# Patient Record
Sex: MALE | Race: WHITE | NOT HISPANIC OR LATINO | URBAN - METROPOLITAN AREA
[De-identification: names, ages, dates, MRNs, and addresses within clinical notes are randomized per-mention and may not be internally consistent; named-entity substitution may affect disease eponyms.]

---

## 2017-04-05 ENCOUNTER — EMERGENCY (EMERGENCY)
Facility: HOSPITAL | Age: 67
LOS: 1 days | Discharge: HOME | End: 2017-04-05
Attending: EMERGENCY MEDICINE | Admitting: EMERGENCY MEDICINE
Payer: COMMERCIAL

## 2017-04-05 VITALS — SYSTOLIC BLOOD PRESSURE: 106 MMHG | DIASTOLIC BLOOD PRESSURE: 69 MMHG

## 2017-04-05 DIAGNOSIS — E78.5 HYPERLIPIDEMIA, UNSPECIFIED: ICD-10-CM

## 2017-04-05 DIAGNOSIS — R61 GENERALIZED HYPERHIDROSIS: ICD-10-CM

## 2017-04-05 DIAGNOSIS — R07.2 PRECORDIAL PAIN: ICD-10-CM

## 2017-04-05 DIAGNOSIS — I25.10 ATHEROSCLEROTIC HEART DISEASE OF NATIVE CORONARY ARTERY WITHOUT ANGINA PECTORIS: ICD-10-CM

## 2017-04-05 DIAGNOSIS — Z95.5 PRESENCE OF CORONARY ANGIOPLASTY IMPLANT AND GRAFT: Chronic | ICD-10-CM

## 2017-04-05 DIAGNOSIS — Z09 ENCOUNTER FOR FOLLOW-UP EXAMINATION AFTER COMPLETED TREATMENT FOR CONDITIONS OTHER THAN MALIGNANT NEOPLASM: Chronic | ICD-10-CM

## 2017-04-05 DIAGNOSIS — R42 DIZZINESS AND GIDDINESS: ICD-10-CM

## 2017-04-05 DIAGNOSIS — I25.2 OLD MYOCARDIAL INFARCTION: ICD-10-CM

## 2017-04-05 DIAGNOSIS — Z79.82 LONG TERM (CURRENT) USE OF ASPIRIN: ICD-10-CM

## 2017-04-05 DIAGNOSIS — Z95.0 PRESENCE OF CARDIAC PACEMAKER: Chronic | ICD-10-CM

## 2017-04-05 DIAGNOSIS — I10 ESSENTIAL (PRIMARY) HYPERTENSION: ICD-10-CM

## 2017-04-05 DIAGNOSIS — Z98.89 OTHER SPECIFIED POSTPROCEDURAL STATES: Chronic | ICD-10-CM

## 2017-04-05 LAB
ALBUMIN SERPL ELPH-MCNC: 4.5 G/DL — SIGNIFICANT CHANGE UP (ref 3.3–5)
ALP SERPL-CCNC: 81 U/L — SIGNIFICANT CHANGE UP (ref 40–120)
ALT FLD-CCNC: 29 U/L RC — SIGNIFICANT CHANGE UP (ref 10–45)
ANION GAP SERPL CALC-SCNC: 14 MMOL/L — SIGNIFICANT CHANGE UP (ref 5–17)
APTT BLD: 28.1 SEC — SIGNIFICANT CHANGE UP (ref 27.5–37.4)
AST SERPL-CCNC: 30 U/L — SIGNIFICANT CHANGE UP (ref 10–40)
BASOPHILS # BLD AUTO: 0.1 K/UL — SIGNIFICANT CHANGE UP (ref 0–0.2)
BASOPHILS NFR BLD AUTO: 1 % — SIGNIFICANT CHANGE UP (ref 0–2)
BILIRUB SERPL-MCNC: 1.1 MG/DL — SIGNIFICANT CHANGE UP (ref 0.2–1.2)
BUN SERPL-MCNC: 19 MG/DL — SIGNIFICANT CHANGE UP (ref 7–23)
CALCIUM SERPL-MCNC: 9 MG/DL — SIGNIFICANT CHANGE UP (ref 8.4–10.5)
CHLORIDE SERPL-SCNC: 103 MMOL/L — SIGNIFICANT CHANGE UP (ref 96–108)
CO2 SERPL-SCNC: 25 MMOL/L — SIGNIFICANT CHANGE UP (ref 22–31)
CREAT SERPL-MCNC: 1.28 MG/DL — SIGNIFICANT CHANGE UP (ref 0.5–1.3)
EOSINOPHIL # BLD AUTO: 0.2 K/UL — SIGNIFICANT CHANGE UP (ref 0–0.5)
EOSINOPHIL NFR BLD AUTO: 2 % — SIGNIFICANT CHANGE UP (ref 0–6)
GLUCOSE SERPL-MCNC: 95 MG/DL — SIGNIFICANT CHANGE UP (ref 70–99)
HCT VFR BLD CALC: 44.3 % — SIGNIFICANT CHANGE UP (ref 39–50)
HGB BLD-MCNC: 15.3 G/DL — SIGNIFICANT CHANGE UP (ref 13–17)
INR BLD: 1.23 RATIO — HIGH (ref 0.88–1.16)
LIDOCAIN IGE QN: 49 U/L — SIGNIFICANT CHANGE UP (ref 7–60)
LYMPHOCYTES # BLD AUTO: 1.6 K/UL — SIGNIFICANT CHANGE UP (ref 1–3.3)
LYMPHOCYTES # BLD AUTO: 16.5 % — SIGNIFICANT CHANGE UP (ref 13–44)
MCHC RBC-ENTMCNC: 30.1 PG — SIGNIFICANT CHANGE UP (ref 27–34)
MCHC RBC-ENTMCNC: 34.6 GM/DL — SIGNIFICANT CHANGE UP (ref 32–36)
MCV RBC AUTO: 87.1 FL — SIGNIFICANT CHANGE UP (ref 80–100)
MONOCYTES # BLD AUTO: 0.9 K/UL — SIGNIFICANT CHANGE UP (ref 0–0.9)
MONOCYTES NFR BLD AUTO: 8.8 % — SIGNIFICANT CHANGE UP (ref 2–14)
NEUTROPHILS # BLD AUTO: 7.1 K/UL — SIGNIFICANT CHANGE UP (ref 1.8–7.4)
NEUTROPHILS NFR BLD AUTO: 71.7 % — SIGNIFICANT CHANGE UP (ref 43–77)
NT-PROBNP SERPL-SCNC: 77 PG/ML — SIGNIFICANT CHANGE UP (ref 0–300)
PLATELET # BLD AUTO: 189 K/UL — SIGNIFICANT CHANGE UP (ref 150–400)
POTASSIUM SERPL-MCNC: 4.4 MMOL/L — SIGNIFICANT CHANGE UP (ref 3.5–5.3)
POTASSIUM SERPL-SCNC: 4.4 MMOL/L — SIGNIFICANT CHANGE UP (ref 3.5–5.3)
PROT SERPL-MCNC: 7.4 G/DL — SIGNIFICANT CHANGE UP (ref 6–8.3)
PROTHROM AB SERPL-ACNC: 13.3 SEC — HIGH (ref 9.8–12.7)
RBC # BLD: 5.09 M/UL — SIGNIFICANT CHANGE UP (ref 4.2–5.8)
RBC # FLD: 12.9 % — SIGNIFICANT CHANGE UP (ref 10.3–14.5)
SODIUM SERPL-SCNC: 142 MMOL/L — SIGNIFICANT CHANGE UP (ref 135–145)
TROPONIN T SERPL-MCNC: <0.01 NG/ML — SIGNIFICANT CHANGE UP (ref 0–0.06)
TROPONIN T SERPL-MCNC: <0.01 NG/ML — SIGNIFICANT CHANGE UP (ref 0–0.06)
WBC # BLD: 9.9 K/UL — SIGNIFICANT CHANGE UP (ref 3.8–10.5)
WBC # FLD AUTO: 9.9 K/UL — SIGNIFICANT CHANGE UP (ref 3.8–10.5)

## 2017-04-05 PROCEDURE — 99220: CPT | Mod: 25

## 2017-04-05 PROCEDURE — 71010: CPT | Mod: 26

## 2017-04-05 PROCEDURE — 93010 ELECTROCARDIOGRAM REPORT: CPT

## 2017-04-05 RX ORDER — SODIUM CHLORIDE 9 MG/ML
3 INJECTION INTRAMUSCULAR; INTRAVENOUS; SUBCUTANEOUS ONCE
Qty: 0 | Refills: 0 | Status: COMPLETED | OUTPATIENT
Start: 2017-04-05 | End: 2017-04-05

## 2017-04-05 RX ORDER — SIMVASTATIN 20 MG/1
40 TABLET, FILM COATED ORAL AT BEDTIME
Qty: 0 | Refills: 0 | Status: DISCONTINUED | OUTPATIENT
Start: 2017-04-05 | End: 2017-04-09

## 2017-04-05 RX ORDER — LOSARTAN POTASSIUM 100 MG/1
100 TABLET, FILM COATED ORAL DAILY
Qty: 0 | Refills: 0 | Status: DISCONTINUED | OUTPATIENT
Start: 2017-04-05 | End: 2017-04-09

## 2017-04-05 RX ORDER — ACETAMINOPHEN 500 MG
975 TABLET ORAL ONCE
Qty: 0 | Refills: 0 | Status: COMPLETED | OUTPATIENT
Start: 2017-04-05 | End: 2017-04-05

## 2017-04-05 RX ORDER — ASPIRIN/CALCIUM CARB/MAGNESIUM 324 MG
81 TABLET ORAL DAILY
Qty: 0 | Refills: 0 | Status: DISCONTINUED | OUTPATIENT
Start: 2017-04-05 | End: 2017-04-09

## 2017-04-05 RX ADMIN — Medication 975 MILLIGRAM(S): at 20:23

## 2017-04-05 RX ADMIN — SODIUM CHLORIDE 3 MILLILITER(S): 9 INJECTION INTRAMUSCULAR; INTRAVENOUS; SUBCUTANEOUS at 13:43

## 2017-04-05 RX ADMIN — SIMVASTATIN 40 MILLIGRAM(S): 20 TABLET, FILM COATED ORAL at 22:48

## 2017-04-05 RX ADMIN — Medication 975 MILLIGRAM(S): at 21:01

## 2017-04-05 RX ADMIN — Medication 81 MILLIGRAM(S): at 17:52

## 2017-04-05 NOTE — ED PROVIDER NOTE - PROGRESS NOTE DETAILS
Dr. Lee notified pt in ED. does not have privilege at [facility] - Nacho Salas, Resident

## 2017-04-05 NOTE — ED PROVIDER NOTE - ATTENDING CONTRIBUTION TO CARE
PMD [facility], PMD/PCP Aleajose rafael (enrique)  66y male pmh mi, ptca with stents #2, HTN, HLD, no tobacco,no travel. No cancer. Pt comes to ed co "All of a sudden bam with chest tightness and dizzyness wiht sweating" onset while at work,given ntg enroute by ems with resolution of symptoms. No loc, ha no sob, abd pain. no syncoe. PE WDWN male nad ncat chest clear ap abd soft = bscv no rgm, neuro no focal defects  Greg Cornelius MD, Facep

## 2017-04-05 NOTE — ED CDU PROVIDER NOTE - ATTENDING CONTRIBUTION TO CARE
I have personally performed a face to face diagnostic evaluation on this patient.  I have reviewed the ACP note and agree with the history, exam, and plan of care, except as noted.  History and Exam by me shows see ed provider note  Greg Cornelius MD, Facep

## 2017-04-05 NOTE — ED CDU PROVIDER NOTE - OBJECTIVE STATEMENT
65yo M pmhx of cad sp stent in 2010 on asa81 pw 1 hour of retrosternal chest pain 6/10, non radiating, a/w light headedness and diaphoresis, took asa 81 today and was given 325 by ems. pts pain improved with ntg x 1.    No fever/chills, no change in vision, no throat pain, no jvd, no sob, no leg swelling or calf pain, no orthopnea, no pnd, no decrease exercised tolerance, no recent travel, no cancer hx, no prior hx of dvt/blood clot,  no abdominal pain, no nausea/vomiting,  no dysuria, no joint pain, no rashes, no focal numbness or weakness, no known mental health issues   PMD Dr. Rosa Orantes  CARDS: [facility]

## 2017-04-05 NOTE — ED CDU PROVIDER NOTE - PROGRESS NOTE DETAILS
Patient seen at bedside in NAD.  VSS.  Patient resting comfortably.  Patient reports that CP has resolved.  C/o slight headache s/p nitro.  Will give tylenol and reassess. -Tin Cool PA-C Patient seen at bedside in NAD.  VSS.  Patient resting comfortably without complaints. No cardiac events noted on tele. -Tin Cool PA-C Pt comfortable. No complaints. Vital signs stable. Will continue to observe and reassess. Awaiting stress test. -Andie Barba PA-C Pt comfortable. No complaints. Vital signs stable. Will continue to observe and reassess. Awaiting stress test results. -Andie Barba PA-C Stress test show minimal barbara-infarct ischemia. Called pts cardiology group [facility], spoke to doctor Sita, who is unable to provide any recommendation. Cardiology Dr. Layne called for evaluation. -Andie Barba PA-C Pt seen by cardiology Dr. Layne, compared stress results to previous outpt studies faxed over from pts doctors office. Per cardiology, pt stable for discharge home. D/W Dr. Camargo. Will plan for d/c home. -Andie Barba PA-C I have personally seen and examined the patient and reviewed the labs, ekgs and reports. Safe for discharge and follow up. On exam, AVSS, cta bl, s1, s,2 rrr, osft nt nd adbomen, no pallor, MMM - CKT

## 2017-04-05 NOTE — ED PROVIDER NOTE - OBJECTIVE STATEMENT
67yo M pmhx of cad sp stent on asa81 pw 1 hour of sscp 6/10, non radiating, a/w light headedness and diaphoresis, took asa 81 today and was given 325 by ems. pts pain improved with ntg x 1.    No fever/chills, no change in vision, no throat pain, no jvd, no sob, no leg swelling or calf pain, no orthopnea, no pnd, no decrease exersized tolerance, no recent travel, no cancer hx, no prior hx of dvt/blood clot,  no abdominal pain, no nausea/vomiting,  no dysuria, no joint pain, no rashes, no focal numbness or weakness, no known mental health issues   PMD Dr. Rosa Jaffe [place]  CARDS: [facility] 67yo M pmhx of cad sp stent on asa81 pw 1 hour of sscp 6/10, non radiating, a/w light headedness and diaphoresis, took asa 81 today and was given 325 by ems. pts pain improved with ntg x 1.    No fever/chills, no change in vision, no throat pain, no jvd, no sob, no leg swelling or calf pain, no orthopnea, no pnd, no decrease exercised tolerance, no recent travel, no cancer hx, no prior hx of dvt/blood clot,  no abdominal pain, no nausea/vomiting,  no dysuria, no joint pain, no rashes, no focal numbness or weakness, no known mental health issues   PMD Dr. Rosa [place]  CARDS: [facility]

## 2017-04-05 NOTE — ED CDU PROVIDER NOTE - PLAN OF CARE
1. Follow up with your PMD and/or cardiologist within 48-72 hours.   2. Show copies of your reports given to you. Continue Aspirin 81mg over the counter daily until further evaluation.  Take all of your other medications as previously prescribed.   3. Worsening or continued chest pain, shortness of breath, weakness, return to ED. 1. Follow up with your cardiologist, Dr. Benjamin Brown in 2-3 days.   2. Show copies of your reports given to you. Continue your current home medications as previously prescribed.   3. If you develop any worsening or continued chest pain, shortness of breath, weakness, or any other concerns, return to ED.

## 2017-04-05 NOTE — ED CDU PROVIDER NOTE - PMH
CAD S/P percutaneous coronary angioplasty  stents x 2  Deaf    High cholesterol    [place] (hard of hearing)    Hyperlipemia    Hypertension    Hypertension    MI (myocardial infarction)  may 2010 stents x 2  Stented coronary artery  x 2  TIA (transient ischemic attack)

## 2017-04-05 NOTE — ED ADULT NURSE NOTE - OBJECTIVE STATEMENT
Report received by [facility]. 66 yr old male c/o chest pain. Hx of heart attack 7 years ago. Pt A&Ox3, VSS, 12 lead EKG completed, IV in left AC 20G upon arrival from EMS, IV in right AC 20G inserted in ED. Pt currently resting in bed with family at bedside, will continue to monitor Report received by [facility]. 66 yr old male c/o chest pain in mid sternal area starting at 12:35 this afternoon. Pt denies pain radiating. Pt denies numbness and tingling, pt rated pain 5 out of 10. Hx of heart attack 7 years ago, pt stated this pain is 'not like the pain he had 7 years ago' Pt reported nose bleeds for the past couple days (~4 times a day). Pt A&Ox3, VSS, heart sounds normal, +pedal pulses bilaterally, +sensory bilaterally, lungs clear, abdomen soft, non-tender to palpitation, pt denies N/V. 12 lead EKG completed, IV in left AC 20G upon arrival from EMS, IV in right AC 20G inserted in ED. Pt currently resting in bed with family at bedside, will continue to monitor

## 2017-04-05 NOTE — ED PROVIDER NOTE - PHYSICAL EXAMINATION
AAOX3, NAD, NCAT, EOMI, PERRL, MMM, Neck Supple, RRR, CTABL, ABD S/NT/ND small umbilical hernia, +BS, No CVAT, EXT warm, well perfused, No Edema, Distal Pulses Intact, No Rash,  MAEx4, Sensation to soft touch grossly intact, normal strength/tone.

## 2017-04-05 NOTE — ED CDU PROVIDER NOTE - PSH
S/P appendectomy    S/P cardiac pacemaker procedure    S/P hernia repair    S/P umbilical hernia repair, follow-up exam    Stented coronary artery  x 2

## 2017-04-05 NOTE — ED PROVIDER NOTE - MEDICAL DECISION MAKING DETAILS
Chest pain consistent with ACS and CAD risk factors 1) serial EKGs/CXR/ASA/O2/cardiac monitor/LABS/nitro prn CP  2) reassess 3) Admit to telemetry

## 2017-04-06 VITALS
RESPIRATION RATE: 18 BRPM | DIASTOLIC BLOOD PRESSURE: 82 MMHG | TEMPERATURE: 98 F | OXYGEN SATURATION: 96 % | SYSTOLIC BLOOD PRESSURE: 142 MMHG | HEART RATE: 86 BPM

## 2017-04-06 LAB
CHOLEST SERPL-MCNC: 117 MG/DL — SIGNIFICANT CHANGE UP (ref 10–199)
HBA1C BLD-MCNC: 5.3 % — SIGNIFICANT CHANGE UP (ref 4–5.6)
HDLC SERPL-MCNC: 27 MG/DL — LOW (ref 40–125)
LIPID PNL WITH DIRECT LDL SERPL: 62 MG/DL — SIGNIFICANT CHANGE UP
TOTAL CHOLESTEROL/HDL RATIO MEASUREMENT: 4.3 RATIO — SIGNIFICANT CHANGE UP (ref 3.4–9.6)
TRIGL SERPL-MCNC: 138 MG/DL — SIGNIFICANT CHANGE UP (ref 10–149)

## 2017-04-06 PROCEDURE — 80061 LIPID PANEL: CPT

## 2017-04-06 PROCEDURE — 83880 ASSAY OF NATRIURETIC PEPTIDE: CPT

## 2017-04-06 PROCEDURE — 85027 COMPLETE CBC AUTOMATED: CPT

## 2017-04-06 PROCEDURE — 93005 ELECTROCARDIOGRAM TRACING: CPT

## 2017-04-06 PROCEDURE — 83690 ASSAY OF LIPASE: CPT

## 2017-04-06 PROCEDURE — 99217: CPT

## 2017-04-06 PROCEDURE — 85730 THROMBOPLASTIN TIME PARTIAL: CPT

## 2017-04-06 PROCEDURE — 93017 CV STRESS TEST TRACING ONLY: CPT

## 2017-04-06 PROCEDURE — 83036 HEMOGLOBIN GLYCOSYLATED A1C: CPT

## 2017-04-06 PROCEDURE — 78452 HT MUSCLE IMAGE SPECT MULT: CPT

## 2017-04-06 PROCEDURE — 99284 EMERGENCY DEPT VISIT MOD MDM: CPT | Mod: 25

## 2017-04-06 PROCEDURE — 99284 EMERGENCY DEPT VISIT MOD MDM: CPT

## 2017-04-06 PROCEDURE — 78452 HT MUSCLE IMAGE SPECT MULT: CPT | Mod: 26

## 2017-04-06 PROCEDURE — 93018 CV STRESS TEST I&R ONLY: CPT

## 2017-04-06 PROCEDURE — A9500: CPT

## 2017-04-06 PROCEDURE — 93016 CV STRESS TEST SUPVJ ONLY: CPT

## 2017-04-06 PROCEDURE — 80053 COMPREHEN METABOLIC PANEL: CPT

## 2017-04-06 PROCEDURE — 84484 ASSAY OF TROPONIN QUANT: CPT

## 2017-04-06 PROCEDURE — 71045 X-RAY EXAM CHEST 1 VIEW: CPT

## 2017-04-06 PROCEDURE — 85610 PROTHROMBIN TIME: CPT

## 2017-04-06 PROCEDURE — G0378: CPT

## 2017-04-06 NOTE — ED ADULT NURSE REASSESSMENT NOTE - NS ED NURSE REASSESS COMMENT FT1
Pt received from ED, resting in stretcher, A&O x4, breathing even and unlabored with no distress noted. Neuro assessment completed with no deficits noted. Pt denies pain or discomfort, SOB or numbness or tingling at this time. Pt maintained on Tele with sinus rhythm noted. Cex1 negative, next trop at 1930. Pending stress test. Pt education provided regarding no caffeine after 8 pm for stress test. IV site, clean, dry and intact with no signs or symptoms of infection present at this time. Pt ambulating independently. Pt oriented to unit, safety maintained and call bell within reach. Will continue to monitor and provider support..
Pt received from Morelia KANG, resting in stretcher, A&O x4, breathing even and unlabored with no distress noted.  Pt denies pain or discomfort, SOB or numbness or tingling at this time. Pt maintained on Tele with sinus rhythm noted. CEx2 negative, pending stress test. IV site, clean, dry and intact with no signs or symptoms of infection present at this time. Pt ambulating independently, family at bedside. Pt oriented to unit, safety maintained and call bell within reach. Will continue to monitor and provider support.
Pt received from PEARL Vásquez. Plan of care was discussed. No complaints of chest pain, SOB, or palpitations. Pt states he has a headache & some lightheadness. BETHANY [place] aware. Medicated as per MAR. Safety & comfort measures maintained. Call bell in reach. Will continue to monitor.

## 2018-04-26 ENCOUNTER — EMERGENCY (EMERGENCY)
Facility: HOSPITAL | Age: 68
LOS: 1 days | Discharge: HOME | End: 2018-04-26
Attending: EMERGENCY MEDICINE
Payer: COMMERCIAL

## 2018-04-26 VITALS
RESPIRATION RATE: 19 BRPM | SYSTOLIC BLOOD PRESSURE: 172 MMHG | DIASTOLIC BLOOD PRESSURE: 88 MMHG | HEART RATE: 75 BPM | TEMPERATURE: 97 F | OXYGEN SATURATION: 98 %

## 2018-04-26 VITALS
DIASTOLIC BLOOD PRESSURE: 84 MMHG | HEART RATE: 72 BPM | SYSTOLIC BLOOD PRESSURE: 153 MMHG | RESPIRATION RATE: 18 BRPM | OXYGEN SATURATION: 97 %

## 2018-04-26 DIAGNOSIS — Z95.5 PRESENCE OF CORONARY ANGIOPLASTY IMPLANT AND GRAFT: Chronic | ICD-10-CM

## 2018-04-26 DIAGNOSIS — Z98.89 OTHER SPECIFIED POSTPROCEDURAL STATES: Chronic | ICD-10-CM

## 2018-04-26 DIAGNOSIS — Z09 ENCOUNTER FOR FOLLOW-UP EXAMINATION AFTER COMPLETED TREATMENT FOR CONDITIONS OTHER THAN MALIGNANT NEOPLASM: Chronic | ICD-10-CM

## 2018-04-26 DIAGNOSIS — Z95.0 PRESENCE OF CARDIAC PACEMAKER: Chronic | ICD-10-CM

## 2018-04-26 LAB
ALBUMIN SERPL ELPH-MCNC: 4.2 G/DL — SIGNIFICANT CHANGE UP (ref 3.3–5.2)
ALP SERPL-CCNC: 86 U/L — SIGNIFICANT CHANGE UP (ref 40–120)
ALT FLD-CCNC: 26 U/L — SIGNIFICANT CHANGE UP
ANION GAP SERPL CALC-SCNC: 14 MMOL/L — SIGNIFICANT CHANGE UP (ref 5–17)
APTT BLD: 30.1 SEC — SIGNIFICANT CHANGE UP (ref 27.5–37.4)
AST SERPL-CCNC: 24 U/L — SIGNIFICANT CHANGE UP
BASOPHILS # BLD AUTO: 0 K/UL — SIGNIFICANT CHANGE UP (ref 0–0.2)
BASOPHILS NFR BLD AUTO: 0.4 % — SIGNIFICANT CHANGE UP (ref 0–2)
BILIRUB SERPL-MCNC: 0.8 MG/DL — SIGNIFICANT CHANGE UP (ref 0.4–2)
BUN SERPL-MCNC: 17 MG/DL — SIGNIFICANT CHANGE UP (ref 8–20)
CALCIUM SERPL-MCNC: 9.5 MG/DL — SIGNIFICANT CHANGE UP (ref 8.6–10.2)
CHLORIDE SERPL-SCNC: 104 MMOL/L — SIGNIFICANT CHANGE UP (ref 98–107)
CK SERPL-CCNC: 144 U/L — SIGNIFICANT CHANGE UP (ref 30–200)
CO2 SERPL-SCNC: 25 MMOL/L — SIGNIFICANT CHANGE UP (ref 22–29)
CREAT SERPL-MCNC: 1.12 MG/DL — SIGNIFICANT CHANGE UP (ref 0.5–1.3)
EOSINOPHIL # BLD AUTO: 0.4 K/UL — SIGNIFICANT CHANGE UP (ref 0–0.5)
EOSINOPHIL NFR BLD AUTO: 4.4 % — SIGNIFICANT CHANGE UP (ref 0–5)
GLUCOSE SERPL-MCNC: 86 MG/DL — SIGNIFICANT CHANGE UP (ref 70–115)
HCT VFR BLD CALC: 44.5 % — SIGNIFICANT CHANGE UP (ref 42–52)
HGB BLD-MCNC: 15.5 G/DL — SIGNIFICANT CHANGE UP (ref 14–18)
INR BLD: 1.21 RATIO — HIGH (ref 0.88–1.16)
LYMPHOCYTES # BLD AUTO: 2 K/UL — SIGNIFICANT CHANGE UP (ref 1–4.8)
LYMPHOCYTES # BLD AUTO: 23.3 % — SIGNIFICANT CHANGE UP (ref 20–55)
MCHC RBC-ENTMCNC: 29.9 PG — SIGNIFICANT CHANGE UP (ref 27–31)
MCHC RBC-ENTMCNC: 34.8 G/DL — SIGNIFICANT CHANGE UP (ref 32–36)
MCV RBC AUTO: 85.9 FL — SIGNIFICANT CHANGE UP (ref 80–94)
MONOCYTES # BLD AUTO: 1 K/UL — HIGH (ref 0–0.8)
MONOCYTES NFR BLD AUTO: 12.4 % — HIGH (ref 3–10)
NEUTROPHILS # BLD AUTO: 5 K/UL — SIGNIFICANT CHANGE UP (ref 1.8–8)
NEUTROPHILS NFR BLD AUTO: 58.4 % — SIGNIFICANT CHANGE UP (ref 37–73)
PLATELET # BLD AUTO: 204 K/UL — SIGNIFICANT CHANGE UP (ref 150–400)
POTASSIUM SERPL-MCNC: 4.4 MMOL/L — SIGNIFICANT CHANGE UP (ref 3.5–5.3)
POTASSIUM SERPL-SCNC: 4.4 MMOL/L — SIGNIFICANT CHANGE UP (ref 3.5–5.3)
PROT SERPL-MCNC: 7.1 G/DL — SIGNIFICANT CHANGE UP (ref 6.6–8.7)
PROTHROM AB SERPL-ACNC: 13.4 SEC — HIGH (ref 9.8–12.7)
RBC # BLD: 5.18 M/UL — SIGNIFICANT CHANGE UP (ref 4.6–6.2)
RBC # FLD: 13.7 % — SIGNIFICANT CHANGE UP (ref 11–15.6)
SODIUM SERPL-SCNC: 143 MMOL/L — SIGNIFICANT CHANGE UP (ref 135–145)
TROPONIN T SERPL-MCNC: <0.01 NG/ML — SIGNIFICANT CHANGE UP (ref 0–0.06)
WBC # BLD: 8.5 K/UL — SIGNIFICANT CHANGE UP (ref 4.8–10.8)
WBC # FLD AUTO: 8.5 K/UL — SIGNIFICANT CHANGE UP (ref 4.8–10.8)

## 2018-04-26 PROCEDURE — 85730 THROMBOPLASTIN TIME PARTIAL: CPT

## 2018-04-26 PROCEDURE — 71045 X-RAY EXAM CHEST 1 VIEW: CPT

## 2018-04-26 PROCEDURE — 70450 CT HEAD/BRAIN W/O DYE: CPT

## 2018-04-26 PROCEDURE — 93005 ELECTROCARDIOGRAM TRACING: CPT

## 2018-04-26 PROCEDURE — 93010 ELECTROCARDIOGRAM REPORT: CPT

## 2018-04-26 PROCEDURE — 99285 EMERGENCY DEPT VISIT HI MDM: CPT

## 2018-04-26 PROCEDURE — 82550 ASSAY OF CK (CPK): CPT

## 2018-04-26 PROCEDURE — 99284 EMERGENCY DEPT VISIT MOD MDM: CPT

## 2018-04-26 PROCEDURE — 85610 PROTHROMBIN TIME: CPT

## 2018-04-26 PROCEDURE — 70450 CT HEAD/BRAIN W/O DYE: CPT | Mod: 26

## 2018-04-26 PROCEDURE — 36415 COLL VENOUS BLD VENIPUNCTURE: CPT

## 2018-04-26 PROCEDURE — 85027 COMPLETE CBC AUTOMATED: CPT

## 2018-04-26 PROCEDURE — 84484 ASSAY OF TROPONIN QUANT: CPT

## 2018-04-26 PROCEDURE — 80053 COMPREHEN METABOLIC PANEL: CPT

## 2018-04-26 PROCEDURE — 71045 X-RAY EXAM CHEST 1 VIEW: CPT | Mod: 26

## 2018-04-26 RX ORDER — FLUTICASONE PROPIONATE 50 MCG
1 SPRAY, SUSPENSION NASAL
Qty: 1 | Refills: 0
Start: 2018-04-26 | End: 2018-05-25

## 2018-04-26 RX ORDER — MECLIZINE HCL 12.5 MG
25 TABLET ORAL ONCE
Qty: 0 | Refills: 0 | Status: COMPLETED | OUTPATIENT
Start: 2018-04-26 | End: 2018-04-26

## 2018-04-26 RX ORDER — SODIUM CHLORIDE 9 MG/ML
3 INJECTION INTRAMUSCULAR; INTRAVENOUS; SUBCUTANEOUS ONCE
Qty: 0 | Refills: 0 | Status: COMPLETED | OUTPATIENT
Start: 2018-04-26 | End: 2018-04-26

## 2018-04-26 RX ORDER — MECLIZINE HCL 12.5 MG
1 TABLET ORAL
Qty: 30 | Refills: 0
Start: 2018-04-26 | End: 2018-05-05

## 2018-04-26 RX ADMIN — SODIUM CHLORIDE 3 MILLILITER(S): 9 INJECTION INTRAMUSCULAR; INTRAVENOUS; SUBCUTANEOUS at 19:22

## 2018-04-26 RX ADMIN — Medication 25 MILLIGRAM(S): at 21:32

## 2018-04-26 NOTE — ED PROVIDER NOTE - PROGRESS NOTE DETAILS
Patient reassessed and feels much better. He is able to ambulate well and move quickly without vertiginous symptoms. I discussed CT results as well as blood work results and patient is comfortable with discharge home at this time. I offered option of MRI as obs patient to completely rule out posterior CVA, but patient declines. I recommended follow up with neurology if his symptoms persist or return to the ED. Patient requests refill of Flonase as well for allergies in preparation of his cruise this weekend.

## 2018-04-26 NOTE — ED PROVIDER NOTE - NS ED ROS FT
Const: Denies fever, chills  HEENT: +VISUAL CHANGES. Denies sore throat  Neck: Denies neck pain/stiffness  Resp: Denies coughing, SOB  Cardiovascular: Denies CP, palpitations, LE edema  GI: Deneis nausea, vomiting, abdominal pain, diarrhea, constipation, blood in stool  : Denies urinary frequency/urgency/dysuria, hematuria  MSK: Denies back pain  Neuro: +DIZZINESS, +LIGHTHEADEDNESS, +HEADACHE, +ATAXIA. Denies numbness, weakness  Skin: Denies rashes.

## 2018-04-26 NOTE — ED STATDOCS - ATTENDING CONTRIBUTION TO CARE
PA/MD NOTE SEEN WITH PA. C/O DIZZY MULTIPLE MEDICAL ISSUES. NEEDS COMPLEX EVAL. AGREE WITH NOTE AND DISPO TO MAIN ROOM FOR FURTHER EVAL

## 2018-04-26 NOTE — ED ADULT NURSE NOTE - OBJECTIVE STATEMENT
Pt admitted to ED c/o dizziness x 3 days. pt describes feelings of being off-balance.  Pt denies headache, syncope or chest pain

## 2018-04-26 NOTE — ED PROVIDER NOTE - PMH
CAD S/P percutaneous coronary angioplasty  stents x 2  Deaf    High cholesterol    [place] (hard of hearing)    Hyperlipemia    Hypertension    Hypertension    MI (myocardial infarction)  may 2010 stents x 2  Stented coronary artery  x 2  TIA (transient ischemic attack)

## 2018-04-26 NOTE — ED PROVIDER NOTE - MEDICAL DECISION MAKING DETAILS
Patient presents complaining of vertiginous symptoms when ambulating or moving quickly, resolved at rest for the past day, improved yesterday with Claritin, but not today. Neuro exam is unremarkable and labs and CT are WNL. I discussed option of MRI in observation vs. outpatient MRI and neuro follow up if symptoms persist and patient opts for outpatient work up as he would like to go on his cruise this weekend.

## 2018-04-26 NOTE — ED ADULT NURSE REASSESSMENT NOTE - NS ED NURSE REASSESS COMMENT FT1
pt care assumed at 1948, no apparent distress noted at this time, charting as noted. pt received Alert and Oriented to person, place, situation and time sitting in bed comfortably with family member at bedside. pt c/o dizziness with movement. pt is Normal Sinus Rhythm on cardiac monitor. pt educated plan of care, pt able to successfully teach RN plan of care back.

## 2018-04-26 NOTE — ED ADULT NURSE NOTE - PMH
CAD S/P percutaneous coronary angioplasty  stents x 2  Deaf    High cholesterol    [place] (hard of hearing)    Hyperlipemia    Hypertension    Hypertension    MI (myocardial infarction)  may 2010 stents x 2  Stented coronary artery  x 2  TIA (transient ischemic attack)

## 2018-04-26 NOTE — ED PROVIDER NOTE - PHYSICAL EXAMINATION
Const: Awake, alert and oriented. In no acute distress. Well appearing.  HEENT: NC/AT. Moist mucous membranes.  Eyes: No scleral icterus. EOMI.  Neck:. Soft and supple. Full ROM without pain.  Cardiac: Regular rate and rhythm. +S1/S2. No murmurs. Peripheral pulses 2+ and symmetric. No LE edema.  Resp: Speaking in full sentences. No evidence of respiratory distress. No wheezes, rales or rhonchi.  Abd: Soft, non-tender, non-distended. Normal bowel sounds in all 4 quadrants. No guarding or rebound.  Back: Spine midline and non-tender. No CVAT.  Skin: No rashes, abrasions or lacerations.  Lymph: No cervical lymphadenopathy.   Neuro: CN II-XII grossly in tact. Normal finger to nose. Normal heel to shin. No pronator drift. Normal gait without assistance. Reflexes 2+ bilaterally.

## 2018-04-26 NOTE — ED ADULT TRIAGE NOTE - CHIEF COMPLAINT QUOTE
Patient arrived to ED today with c/o dizziness, unsteady gait, lightheadedness for the past two days.  Patient denies chest pain, SOB.

## 2018-04-26 NOTE — ED ADULT NURSE NOTE - CHPI ED SYMPTOMS NEG
no numbness/no confusion/no weakness/no loss of consciousness/no nausea/no blurred vision/no fever/no vomiting/no change in level of consciousness

## 2018-04-26 NOTE — ED STATDOCS - PROGRESS NOTE DETAILS
pt is a 68yo yo male with pmhx of TIA, MI s/p stent, HTN, HLD c/o dizziness with unsteady gait. pt sent to main ED for further evaluation.

## 2018-04-26 NOTE — ED ADULT TRIAGE NOTE - DIRECT TO ROOM CARE INITIATED:
[facility] Prior Authorization Team   Phone: [phone]  Fax: [phone]      PA Initiation    Medication: omeprazole (PRILOSEC) 2 mg/mL  Insurance Company: [employer]  Fax Number: [phone]    Phone Number: [phone]  Pharmacy Filling the Rx: [facility] [address]  Filling Pharmacy Phone: [phone]  Filling Pharmacy Fax: [phone]  Start Date: 1/11/2017              
PRIOR AUTHORIZATION DENIED    Medication: omeprazole (PRILOSEC) 2 mg/mL- denied    Denial Date: 1/12/2017    Denial Rational: script is denied because this medication is not approved by the FDA, therefore it is excluded from Medicare Part D drug plan            Appeal Allowed/Information:         
26-Apr-2018 18:22

## 2018-04-26 NOTE — ED PROVIDER NOTE - NS_ ATTENDINGSCRIBEDETAILS _ED_A_ED_FT
The history, relevant past medical and surgical history, review of systems and physical examination as well as the medical decision making was documented by the scribe in my presence and I attest to the accuracy of the documentation. The history, relevant past medical and surgical history, review of systems and physical examination as well as the medical decision making was documented by the scribe in my presence and I attest to the accuracy of the documentation.  PA/MD VISIT/SCREEING. PT WITH MULTIPLE MEDICAL CO MORBISITIES. C/O DIZZY, CP. NO FOCAL FINDINGS ON PE. TRANSFER TO [facility]

## 2018-04-26 NOTE — ED STATDOCS - PMH
CAD S/P percutaneous coronary angioplasty  stents x 2  Deaf    High cholesterol    [place] (hard of hearing)    Hyperlipemia    Hypertension    Hypertension    MI (myocardial infarction)  may 2010 stents x 2  Stented coronary artery  x 2  TIA (transient ischemic attack)

## 2019-02-19 ENCOUNTER — APPOINTMENT (OUTPATIENT)
Dept: COLORECTAL SURGERY | Facility: CLINIC | Age: 69
End: 2019-02-19
Payer: COMMERCIAL

## 2019-02-19 VITALS
HEART RATE: 82 BPM | BODY MASS INDEX: 31.1 KG/M2 | SYSTOLIC BLOOD PRESSURE: 149 MMHG | DIASTOLIC BLOOD PRESSURE: 78 MMHG | HEIGHT: 69 IN | WEIGHT: 210 LBS | RESPIRATION RATE: 14 BRPM

## 2019-02-19 DIAGNOSIS — Z80.0 FAMILY HISTORY OF MALIGNANT NEOPLASM OF DIGESTIVE ORGANS: ICD-10-CM

## 2019-02-19 DIAGNOSIS — Z86.79 PERSONAL HISTORY OF OTHER DISEASES OF THE CIRCULATORY SYSTEM: ICD-10-CM

## 2019-02-19 DIAGNOSIS — Z86.73 PERSONAL HISTORY OF TRANSIENT ISCHEMIC ATTACK (TIA), AND CEREBRAL INFARCTION W/OUT RESIDUAL DEFICITS: ICD-10-CM

## 2019-02-19 DIAGNOSIS — H91.90 UNSPECIFIED HEARING LOSS, UNSPECIFIED EAR: ICD-10-CM

## 2019-02-19 DIAGNOSIS — Z87.19 PERSONAL HISTORY OF OTHER DISEASES OF THE DIGESTIVE SYSTEM: ICD-10-CM

## 2019-02-19 PROCEDURE — 99243 OFF/OP CNSLTJ NEW/EST LOW 30: CPT | Mod: 25

## 2019-02-19 PROCEDURE — 46600 DIAGNOSTIC ANOSCOPY SPX: CPT

## 2019-02-19 NOTE — ASSESSMENT
[FreeTextEntry1] : Mr. Harrell presents to the office with reports of internal hemorrhoidal flares as well as pruritus ani. The symptoms include burning/itching/bleeding after bowel movements. We discussed the fact that all individuals possess hemorrhoids, but they are not notable, except in their symptomatic state. The pathophysiology of hemorrhoidal flares were reviewed, including the contribution of straining, hard stools, diarrheal stools in precipitating the symptoms. In order to regulate bowel regimen and prevent additional flares, recommendations were made for a high fiber diet (25-30g daily)  with ample water intake (80oz daily) +/- MiraLax daily. To alleviate the current hemorrhoidal discomfort, hydrocortisone suppositories will be presctibed nightly. \par With regards to the pruritus. I discussed the causes for this dermatitis including over vigorous cleansing of the perianal skin, the misperception of hemorrhoidal burning as perianal skin irritation, and fecal leakage/seepage from loose stools. Patient presented to the office with signs and symptoms of pruritus ani. I discussed the causes for this dermatitis including over vigorous cleansing of the perianal skin, the misperception of hemorrhoidal burning as perianal skin irritation, and fecal leakage/seepage from loose stools. In order to allow for healing of the perianal skin, anal hygiene should be limited to rinsing the skin with warm water after a bowel movement, and then patting dry. Hydrocortisone cream 2.5% can be applied t.i.d. to facilitate healing. Finally, to address the pruritus symptoms that tend to awaken individuals at night, calmoseptine cream can be applied for symptomatic relief.\par If no improvement after 2 weeks, he is to return to the office for further evaluation.\par

## 2019-02-19 NOTE — CONSULT LETTER
[Dear  ___] : Dear  [unfilled], [Consult Letter:] : I had the pleasure of evaluating your patient, [unfilled]. [Please see my note below.] : Please see my note below. [Consult Closing:] : Thank you very much for allowing me to participate in the care of this patient.  If you have any questions, please do not hesitate to contact me. [Sincerely,] : Sincerely, [FreeTextEntry3] : Frida Bowen MD\par

## 2019-02-19 NOTE — PHYSICAL EXAM
[Normal rectal exam] : exam was normal [Reduce Spontaneously] : a spontaneously reducible (grade II) [Skin Tags] : there were no residual hemorrhoidal skin tags seen [Normal] : was normal [None] : there was no rectal mass  [No Rash or Lesion] : No rash or lesion [Alert] : alert [Oriented to Person] : oriented to person [Oriented to Place] : oriented to place [Oriented to Time] : oriented to time [Calm] : calm [de-identified] : inflamed [de-identified] : NAD [de-identified] : NCAT [de-identified] : CORONEL x 4

## 2019-02-19 NOTE — HISTORY OF PRESENT ILLNESS
[FreeTextEntry1] : Mr. Benavidez presents to the office for consultation regarding symptomatic internal hemorrhoids as well as pruritus ani. He reports stools that occasionally require manual assistance to be fully evacuated. As a result, he experiences hemorrhoidal itching and discomfort. He then uses a significant amount of toilet paper to cleanse the area which leads to perianal pruritus. He has used preparation H with limited relief. He denies any rectal bleeding or prolapse of internal columns.

## 2019-04-19 ENCOUNTER — APPOINTMENT (OUTPATIENT)
Dept: COLORECTAL SURGERY | Facility: CLINIC | Age: 69
End: 2019-04-19

## 2019-05-28 ENCOUNTER — APPOINTMENT (OUTPATIENT)
Dept: COLORECTAL SURGERY | Facility: CLINIC | Age: 69
End: 2019-05-28
Payer: COMMERCIAL

## 2019-05-28 VITALS
BODY MASS INDEX: 31.1 KG/M2 | RESPIRATION RATE: 14 BRPM | SYSTOLIC BLOOD PRESSURE: 148 MMHG | HEART RATE: 75 BPM | HEIGHT: 69 IN | DIASTOLIC BLOOD PRESSURE: 33 MMHG | WEIGHT: 210 LBS

## 2019-05-28 DIAGNOSIS — Z00.00 ENCOUNTER FOR GENERAL ADULT MEDICAL EXAMINATION W/OUT ABNORMAL FINDINGS: ICD-10-CM

## 2019-05-28 PROCEDURE — 99214 OFFICE O/P EST MOD 30 MIN: CPT | Mod: 25

## 2019-05-28 PROCEDURE — 46221 LIGATION OF HEMORRHOID(S): CPT

## 2019-05-28 NOTE — ASSESSMENT
[FreeTextEntry1] : Mr. Benavidez presents to the office with prolapsing  internal hemorrhoids. I discussed the option of rubber band ligation to him and its temporizing effect on hemorrhoids, and cautioned that it is not a procedure with long lasting durable results. We proceeded to rubber band ligate right lateral column to good effect. I have advised him on what to expect post procedure, and have recommended that he return to the office in 2 weeks' time for a repeat ligation.\par

## 2019-05-28 NOTE — HISTORY OF PRESENT ILLNESS
[FreeTextEntry1] : Mr. Benavidez returns to office for followup. Adhering to bowel regimen and reports that 95% stools are more easily evacuated. No longer has issue with pruritus. C/o hemorrhoidal prolapse that requries manual reduction. No bleeding. No burning/itching.

## 2019-05-28 NOTE — PHYSICAL EXAM
[Normal rectal exam] : exam was normal [Skin Tags] : there were no residual hemorrhoidal skin tags seen [Manually Reducible] : a manually reducible (grade III) [Normal] : was normal [None] : no [No Rash or Lesion] : No rash or lesion [Alert] : alert [Oriented to Person] : oriented to person [Oriented to Time] : oriented to time [Oriented to Place] : oriented to place [de-identified] : Grade III right lateral with e/o chronic inflammation  [de-identified] : continued mild pruritus [Calm] : calm [de-identified] : NCAT [de-identified] : NAD [de-identified] : CORONEL x 4

## 2019-06-19 ENCOUNTER — APPOINTMENT (OUTPATIENT)
Dept: COLORECTAL SURGERY | Facility: CLINIC | Age: 69
End: 2019-06-19
Payer: COMMERCIAL

## 2019-06-19 VITALS
HEART RATE: 83 BPM | DIASTOLIC BLOOD PRESSURE: 104 MMHG | RESPIRATION RATE: 14 BRPM | TEMPERATURE: 97.9 F | HEIGHT: 69 IN | SYSTOLIC BLOOD PRESSURE: 174 MMHG

## 2019-06-19 PROCEDURE — 99213 OFFICE O/P EST LOW 20 MIN: CPT | Mod: 25

## 2019-06-19 PROCEDURE — XXXXX: CPT

## 2019-06-19 PROCEDURE — 46221 LIGATION OF HEMORRHOID(S): CPT

## 2019-06-20 NOTE — PHYSICAL EXAM
[Normal rectal exam] : exam was normal [Manually Reducible] : a manually reducible (grade III) [Skin Tags] : there were no residual hemorrhoidal skin tags seen [Normal] : was normal [None] : there was no rectal mass  [Oriented to Person] : oriented to person [No Rash or Lesion] : No rash or lesion [Alert] : alert [Oriented to Place] : oriented to place [Oriented to Time] : oriented to time [Calm] : calm [de-identified] : Grade II  [de-identified] : continued mild pruritus [de-identified] : NCAT [de-identified] : NAD [de-identified] : CORONEL x 4

## 2019-06-20 NOTE — ASSESSMENT
[FreeTextEntry1] : Mr. Benavidez presents to the office with prolapsing  internal hemorrhoids. We proceeded to rubber band ligate his posterior column in two areas to good effect. He is interested in other options for management of his internal hemorrhoids such as suture ligation. If continued symptoms at next followup, can pursue that option. \par RTO 3 weeks.

## 2019-06-20 NOTE — HISTORY OF PRESENT ILLNESS
[FreeTextEntry1] : Mr. Benavidez returns to office for followup. Adhering to bowel regimen and reports that 95% stools are more easily evacuated. No longer has issue with pruritus. Since last RBL, has had much improvement in symptoms. Recently, has noticed more burning after BMs. No bleeding. Here for additional ligations.

## 2019-07-12 ENCOUNTER — APPOINTMENT (OUTPATIENT)
Dept: COLORECTAL SURGERY | Facility: CLINIC | Age: 69
End: 2019-07-12
Payer: COMMERCIAL

## 2019-07-12 VITALS
TEMPERATURE: 98.3 F | WEIGHT: 212 LBS | BODY MASS INDEX: 31.4 KG/M2 | DIASTOLIC BLOOD PRESSURE: 83 MMHG | HEART RATE: 77 BPM | SYSTOLIC BLOOD PRESSURE: 143 MMHG | OXYGEN SATURATION: 94 % | HEIGHT: 69 IN

## 2019-07-12 PROCEDURE — 46221 LIGATION OF HEMORRHOID(S): CPT

## 2019-07-12 PROCEDURE — 99214 OFFICE O/P EST MOD 30 MIN: CPT | Mod: 25

## 2019-07-12 NOTE — PHYSICAL EXAM
[Manually Reducible] : a manually reducible (grade III) [Normal rectal exam] : exam was normal [Normal] : was normal [Skin Tags] : there were no residual hemorrhoidal skin tags seen [None] : there was no rectal mass  [Alert] : alert [Oriented to Person] : oriented to person [No Rash or Lesion] : No rash or lesion [Oriented to Time] : oriented to time [Oriented to Place] : oriented to place [de-identified] : continued mild pruritus [Calm] : calm [de-identified] : CORONEL x 4 [de-identified] : Grade II  [de-identified] : NCAT [de-identified] : NAD

## 2019-07-12 NOTE — ASSESSMENT
[FreeTextEntry1] : Mr. Benavidez presents to the office with prolapsing internal hemorrhoids. We proceeded to rubber band ligate his posterior column in to good effect. I suspect he may have an element of nonrelaxing pelvic floor contributing to his issues with defecation despite reported adherence to a good bowel regimen. He and his wife note that symptoms of obstructive defecation did improve with my first rubber band ligation in which a good amount of tissue was treated. Based on this, they are interested in suture ligation of internal hemorrhoids. I have counselled them that this may not improve or resolve all of his defecation issues as I still believe there is a behavioral component involved, but they are willing to proceed.\par R/B/A of procedure, including preparation, postop procedure debility and discomfort were detailed. \par We will proceed with scheduling.

## 2019-07-12 NOTE — HISTORY OF PRESENT ILLNESS
[FreeTextEntry1] : Mr. Benavidez returns to office for followup.  Since last RBL, reports tranisent improvement in symptoms that have since returned. Convinced that his enlarged hemorrhoids are causing obstructive defecation and would is here to discuss operative treatment.

## 2019-08-01 ENCOUNTER — FORM ENCOUNTER (OUTPATIENT)
Age: 69
End: 2019-08-01

## 2019-08-02 ENCOUNTER — OUTPATIENT (OUTPATIENT)
Dept: OUTPATIENT SERVICES | Facility: HOSPITAL | Age: 69
LOS: 1 days | End: 2019-08-02
Payer: COMMERCIAL

## 2019-08-02 DIAGNOSIS — Z01.818 ENCOUNTER FOR OTHER PREPROCEDURAL EXAMINATION: ICD-10-CM

## 2019-08-02 DIAGNOSIS — Z95.0 PRESENCE OF CARDIAC PACEMAKER: Chronic | ICD-10-CM

## 2019-08-02 DIAGNOSIS — Z95.5 PRESENCE OF CORONARY ANGIOPLASTY IMPLANT AND GRAFT: Chronic | ICD-10-CM

## 2019-08-02 DIAGNOSIS — Z09 ENCOUNTER FOR FOLLOW-UP EXAMINATION AFTER COMPLETED TREATMENT FOR CONDITIONS OTHER THAN MALIGNANT NEOPLASM: Chronic | ICD-10-CM

## 2019-08-02 DIAGNOSIS — Z98.89 OTHER SPECIFIED POSTPROCEDURAL STATES: Chronic | ICD-10-CM

## 2019-08-02 LAB
ANION GAP SERPL CALC-SCNC: 13 MMOL/L — SIGNIFICANT CHANGE UP (ref 5–17)
APTT BLD: 31.1 SEC — SIGNIFICANT CHANGE UP (ref 27.5–36.3)
BASOPHILS # BLD AUTO: 0.1 K/UL — SIGNIFICANT CHANGE UP (ref 0–0.2)
BASOPHILS NFR BLD AUTO: 1 % — SIGNIFICANT CHANGE UP (ref 0–2)
BUN SERPL-MCNC: 19 MG/DL — SIGNIFICANT CHANGE UP (ref 8–20)
CALCIUM SERPL-MCNC: 10 MG/DL — SIGNIFICANT CHANGE UP (ref 8.6–10.2)
CHLORIDE SERPL-SCNC: 104 MMOL/L — SIGNIFICANT CHANGE UP (ref 98–107)
CO2 SERPL-SCNC: 27 MMOL/L — SIGNIFICANT CHANGE UP (ref 22–29)
CREAT SERPL-MCNC: 1.29 MG/DL — SIGNIFICANT CHANGE UP (ref 0.5–1.3)
EOSINOPHIL # BLD AUTO: 0.4 K/UL — SIGNIFICANT CHANGE UP (ref 0–0.5)
EOSINOPHIL NFR BLD AUTO: 4.1 % — SIGNIFICANT CHANGE UP (ref 0–6)
GLUCOSE SERPL-MCNC: 85 MG/DL — SIGNIFICANT CHANGE UP (ref 70–115)
HCT VFR BLD CALC: 43.4 % — SIGNIFICANT CHANGE UP (ref 39–50)
HGB BLD-MCNC: 14.9 G/DL — SIGNIFICANT CHANGE UP (ref 13–17)
IMM GRANULOCYTES NFR BLD AUTO: 1.4 % — SIGNIFICANT CHANGE UP (ref 0–1.5)
INR BLD: 1.24 RATIO — HIGH (ref 0.88–1.16)
LYMPHOCYTES # BLD AUTO: 1.93 K/UL — SIGNIFICANT CHANGE UP (ref 1–3.3)
LYMPHOCYTES # BLD AUTO: 19.8 % — SIGNIFICANT CHANGE UP (ref 13–44)
MCHC RBC-ENTMCNC: 29.7 PG — SIGNIFICANT CHANGE UP (ref 27–34)
MCHC RBC-ENTMCNC: 34.3 GM/DL — SIGNIFICANT CHANGE UP (ref 32–36)
MCV RBC AUTO: 86.5 FL — SIGNIFICANT CHANGE UP (ref 80–100)
MONOCYTES # BLD AUTO: 1.05 K/UL — HIGH (ref 0–0.9)
MONOCYTES NFR BLD AUTO: 10.8 % — SIGNIFICANT CHANGE UP (ref 2–14)
NEUTROPHILS # BLD AUTO: 6.11 K/UL — SIGNIFICANT CHANGE UP (ref 1.8–7.4)
NEUTROPHILS NFR BLD AUTO: 62.9 % — SIGNIFICANT CHANGE UP (ref 43–77)
PLATELET # BLD AUTO: 215 K/UL — SIGNIFICANT CHANGE UP (ref 150–400)
POTASSIUM SERPL-MCNC: 4.1 MMOL/L — SIGNIFICANT CHANGE UP (ref 3.5–5.3)
POTASSIUM SERPL-SCNC: 4.1 MMOL/L — SIGNIFICANT CHANGE UP (ref 3.5–5.3)
PROTHROM AB SERPL-ACNC: 14.4 SEC — HIGH (ref 10–12.9)
RBC # BLD: 5.02 M/UL — SIGNIFICANT CHANGE UP (ref 4.2–5.8)
RBC # FLD: 12.9 % — SIGNIFICANT CHANGE UP (ref 10.3–14.5)
SODIUM SERPL-SCNC: 144 MMOL/L — SIGNIFICANT CHANGE UP (ref 135–145)
WBC # BLD: 9.73 K/UL — SIGNIFICANT CHANGE UP (ref 3.8–10.5)
WBC # FLD AUTO: 9.73 K/UL — SIGNIFICANT CHANGE UP (ref 3.8–10.5)

## 2019-08-02 PROCEDURE — 85610 PROTHROMBIN TIME: CPT

## 2019-08-02 PROCEDURE — 36415 COLL VENOUS BLD VENIPUNCTURE: CPT

## 2019-08-02 PROCEDURE — 93010 ELECTROCARDIOGRAM REPORT: CPT

## 2019-08-02 PROCEDURE — G0463: CPT

## 2019-08-02 PROCEDURE — 85027 COMPLETE CBC AUTOMATED: CPT

## 2019-08-02 PROCEDURE — 85730 THROMBOPLASTIN TIME PARTIAL: CPT

## 2019-08-02 PROCEDURE — 71046 X-RAY EXAM CHEST 2 VIEWS: CPT | Mod: 26

## 2019-08-02 PROCEDURE — 80048 BASIC METABOLIC PNL TOTAL CA: CPT

## 2019-08-02 PROCEDURE — 93005 ELECTROCARDIOGRAM TRACING: CPT

## 2019-08-02 PROCEDURE — 71046 X-RAY EXAM CHEST 2 VIEWS: CPT

## 2019-08-12 ENCOUNTER — MEDICATION RENEWAL (OUTPATIENT)
Age: 69
End: 2019-08-12

## 2019-08-13 ENCOUNTER — APPOINTMENT (OUTPATIENT)
Dept: COLORECTAL SURGERY | Facility: AMBULATORY SURGERY CENTER | Age: 69
End: 2019-08-13

## 2019-08-16 RX ORDER — SODIUM CHLORIDE 9 MG/ML
3 INJECTION INTRAMUSCULAR; INTRAVENOUS; SUBCUTANEOUS ONCE
Refills: 0 | Status: DISCONTINUED | OUTPATIENT
Start: 2019-08-29 | End: 2019-09-14

## 2019-08-16 RX ORDER — CEFAZOLIN SODIUM 1 G
2000 VIAL (EA) INJECTION ONCE
Refills: 0 | Status: DISCONTINUED | OUTPATIENT
Start: 2019-08-29 | End: 2019-09-14

## 2019-08-20 NOTE — ASU PATIENT PROFILE, ADULT - PMH
CAD S/P percutaneous coronary angioplasty  stents x 2  Deaf    High cholesterol    [place] (hard of hearing)    Hyperlipemia    Hypertension    Hypertension    MI (myocardial infarction)  may 2010 stents x 2  Stented coronary artery  x 2  TIA (transient ischemic attack)

## 2019-08-20 NOTE — ASU PATIENT PROFILE, ADULT - LEARNING ASSESSMENT (PATIENT) ADDITIONAL COMMENTS
Telephone interview with pt's wife, Tierra, instructed on pre-op instructions, tips for safer surgery, pain management scale, cardiac clearance completed, take Losartan and ASA with a sip of water on the morning of surgery, maintain ASA without discontinuation as per Dr Earl, Cardiologist, understanding of all instructions verbalized.

## 2019-08-28 ENCOUNTER — TRANSCRIPTION ENCOUNTER (OUTPATIENT)
Age: 69
End: 2019-08-28

## 2019-08-29 ENCOUNTER — OUTPATIENT (OUTPATIENT)
Dept: INPATIENT UNIT | Facility: HOSPITAL | Age: 69
LOS: 1 days | End: 2019-08-29
Payer: COMMERCIAL

## 2019-08-29 ENCOUNTER — APPOINTMENT (OUTPATIENT)
Dept: COLORECTAL SURGERY | Facility: HOSPITAL | Age: 69
End: 2019-08-29
Payer: COMMERCIAL

## 2019-08-29 VITALS
TEMPERATURE: 98 F | SYSTOLIC BLOOD PRESSURE: 150 MMHG | DIASTOLIC BLOOD PRESSURE: 70 MMHG | RESPIRATION RATE: 16 BRPM | HEART RATE: 76 BPM | HEIGHT: 67 IN | OXYGEN SATURATION: 97 % | WEIGHT: 213.85 LBS

## 2019-08-29 VITALS
HEART RATE: 83 BPM | RESPIRATION RATE: 16 BRPM | OXYGEN SATURATION: 97 % | DIASTOLIC BLOOD PRESSURE: 86 MMHG | SYSTOLIC BLOOD PRESSURE: 134 MMHG

## 2019-08-29 DIAGNOSIS — Z95.5 PRESENCE OF CORONARY ANGIOPLASTY IMPLANT AND GRAFT: Chronic | ICD-10-CM

## 2019-08-29 DIAGNOSIS — K64.8 OTHER HEMORRHOIDS: ICD-10-CM

## 2019-08-29 DIAGNOSIS — Z95.0 PRESENCE OF CARDIAC PACEMAKER: Chronic | ICD-10-CM

## 2019-08-29 DIAGNOSIS — Z09 ENCOUNTER FOR FOLLOW-UP EXAMINATION AFTER COMPLETED TREATMENT FOR CONDITIONS OTHER THAN MALIGNANT NEOPLASM: Chronic | ICD-10-CM

## 2019-08-29 DIAGNOSIS — Z98.89 OTHER SPECIFIED POSTPROCEDURAL STATES: Chronic | ICD-10-CM

## 2019-08-29 PROCEDURE — 46946 INT HRHC LIG 2+HROID W/O IMG: CPT

## 2019-08-29 RX ORDER — BUPIVACAINE 13.3 MG/ML
20 INJECTION, SUSPENSION, LIPOSOMAL INFILTRATION ONCE
Refills: 0 | Status: DISCONTINUED | OUTPATIENT
Start: 2019-08-29 | End: 2019-09-14

## 2019-08-29 NOTE — BRIEF OPERATIVE NOTE - NSICDXBRIEFPROCEDURE_GEN_ALL_CORE_FT
PROCEDURES:  Ligation, hemorrhoid, internal, multiple 29-Aug-2019 14:18:10 Internal ligation of internal hemorrhoids Romel Russo

## 2019-08-29 NOTE — ASU DISCHARGE PLAN (ADULT/PEDIATRIC) - CARE PROVIDER_API CALL
Frida Bowen)  ColonRectal Surgery; [place]  [address] [place] [address]  Phone: [phone]  Fax: [phone]  Follow Up Time: 2 weeks

## 2019-09-04 ENCOUNTER — OTHER (OUTPATIENT)
Age: 69
End: 2019-09-04

## 2019-09-11 ENCOUNTER — APPOINTMENT (OUTPATIENT)
Dept: CARDIOLOGY | Facility: CLINIC | Age: 69
End: 2019-09-11
Payer: COMMERCIAL

## 2019-09-11 VITALS
HEIGHT: 69 IN | DIASTOLIC BLOOD PRESSURE: 82 MMHG | WEIGHT: 212 LBS | BODY MASS INDEX: 31.4 KG/M2 | SYSTOLIC BLOOD PRESSURE: 157 MMHG | OXYGEN SATURATION: 96 % | HEART RATE: 80 BPM

## 2019-09-11 VITALS — SYSTOLIC BLOOD PRESSURE: 148 MMHG | DIASTOLIC BLOOD PRESSURE: 85 MMHG

## 2019-09-11 DIAGNOSIS — R09.89 OTHER SPECIFIED SYMPTOMS AND SIGNS INVOLVING THE CIRCULATORY AND RESPIRATORY SYSTEMS: ICD-10-CM

## 2019-09-11 PROCEDURE — 93000 ELECTROCARDIOGRAM COMPLETE: CPT

## 2019-09-11 PROCEDURE — 99213 OFFICE O/P EST LOW 20 MIN: CPT

## 2019-09-11 PROCEDURE — 99205 OFFICE O/P NEW HI 60 MIN: CPT

## 2019-09-11 RX ORDER — LIDOCAINE HYDROCHLORIDE 20 MG/ML
2 JELLY TOPICAL
Qty: 2 | Refills: 2 | Status: DISCONTINUED | COMMUNITY
Start: 2019-09-04 | End: 2019-09-11

## 2019-09-11 RX ORDER — IBUPROFEN 800 MG/1
800 TABLET, FILM COATED ORAL EVERY 6 HOURS
Qty: 28 | Refills: 1 | Status: DISCONTINUED | COMMUNITY
Start: 2019-09-04 | End: 2019-09-11

## 2019-09-11 RX ORDER — POLYETHYLENE GLYCOL-3350, SODIUM CHLORIDE, POTASSIUM CHLORIDE AND SODIUM BICARBONATE 420; 11.2; 5.72; 1.48 G/438.4G; G/438.4G; G/438.4G; G/438.4G
420 POWDER, FOR SOLUTION ORAL
Qty: 4000 | Refills: 0 | Status: DISCONTINUED | COMMUNITY
Start: 2019-02-18 | End: 2019-09-11

## 2019-09-11 RX ORDER — SODIUM SULFATE, POTASSIUM SULFATE, MAGNESIUM SULFATE 17.5; 3.13; 1.6 G/ML; G/ML; G/ML
17.5-3.13-1.6 SOLUTION, CONCENTRATE ORAL
Qty: 354 | Refills: 0 | Status: DISCONTINUED | COMMUNITY
Start: 2019-02-18 | End: 2019-09-11

## 2019-09-11 RX ORDER — HYDROCORTISONE ACETATE 25 MG/1
25 SUPPOSITORY RECTAL TWICE DAILY
Qty: 28 | Refills: 3 | Status: DISCONTINUED | COMMUNITY
Start: 2019-02-19 | End: 2019-09-11

## 2019-09-11 RX ORDER — KETOROLAC TROMETHAMINE 10 MG/1
10 TABLET, FILM COATED ORAL EVERY 6 HOURS
Qty: 20 | Refills: 0 | Status: DISCONTINUED | COMMUNITY
Start: 2019-08-12 | End: 2019-09-11

## 2019-09-11 RX ORDER — OXYCODONE AND ACETAMINOPHEN 5; 325 MG/1; MG/1
5-325 TABLET ORAL
Qty: 20 | Refills: 0 | Status: DISCONTINUED | COMMUNITY
Start: 2019-08-12 | End: 2019-09-11

## 2019-09-11 RX ORDER — HYDROCORTISONE ACETATE, PRAMOXINE HCL 2.5; 1 G/100G; G/100G
2.5-1 CREAM TOPICAL 3 TIMES DAILY
Qty: 1 | Refills: 3 | Status: DISCONTINUED | COMMUNITY
Start: 2019-02-19 | End: 2019-09-11

## 2019-09-18 ENCOUNTER — APPOINTMENT (OUTPATIENT)
Dept: COLORECTAL SURGERY | Facility: CLINIC | Age: 69
End: 2019-09-18
Payer: COMMERCIAL

## 2019-09-18 VITALS
SYSTOLIC BLOOD PRESSURE: 187 MMHG | HEART RATE: 85 BPM | WEIGHT: 212 LBS | DIASTOLIC BLOOD PRESSURE: 81 MMHG | BODY MASS INDEX: 31.4 KG/M2 | HEIGHT: 69 IN | TEMPERATURE: 98.1 F

## 2019-09-18 DIAGNOSIS — Z09 ENCOUNTER FOR FOLLOW-UP EXAMINATION AFTER COMPLETED TREATMENT FOR CONDITIONS OTHER THAN MALIGNANT NEOPLASM: ICD-10-CM

## 2019-09-18 PROCEDURE — 46600 DIAGNOSTIC ANOSCOPY SPX: CPT

## 2019-09-18 PROCEDURE — 99024 POSTOP FOLLOW-UP VISIT: CPT

## 2019-09-18 NOTE — PHYSICAL EXAM
[Normal rectal exam] : exam was normal [None] : no anal fissures seen [Reduce Spontaneously] : a spontaneously reducible (grade II) [No Rash or Lesion] : No rash or lesion [Skin Tags] : there were no residual hemorrhoidal skin tags seen [Alert] : alert [Oriented to Person] : oriented to person [Oriented to Place] : oriented to place [Oriented to Time] : oriented to time [Calm] : calm [de-identified] : pruritus ani [de-identified] : residual inflammation [de-identified] : No apparent distress [de-identified] : Moving all extremities x4 [de-identified] : Normocephalic atraumatic

## 2019-09-18 NOTE — ASSESSMENT
[FreeTextEntry1] : Here for POV.\par Looks well and recovered uneventfully.\par Cont to report difficulty with evacuating stool.\par Suspect pelvic floor dysfunction and have recommended MRI defecography.\par He wants to revisit bowel regimen to ensure that he is bulking up his stools appropriately before doing more studies.\par Should there be no improvement over the course of a month, will return to office, and will be more agreeable to pursue defecography.

## 2019-09-18 NOTE — REASON FOR VISIT
[Post Op: _________] : a [unfilled] post op visit [FreeTextEntry1] : 8/29/19 Suture ligation of internal hemorrhoids

## 2019-09-18 NOTE — HISTORY OF PRESENT ILLNESS
[FreeTextEntry1] : Here for POV.\par Had significant anorectal pain and pressure as anticipated for the first two weeks postop. \par Pain has now subsided.\par Stools are soft, but still feels that there is a blockage and cannot evacuate his stools without digitizing.

## 2019-09-24 PROBLEM — R09.89 ABSENT PEDAL PULSES: Status: ACTIVE | Noted: 2019-09-11

## 2019-09-24 NOTE — HISTORY OF PRESENT ILLNESS
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 \par currently He denies no exertional chest pain or  SOB, palpitations, dizziness or syncope\par Stress test in 2018 showed large fixed defect consistent with prior infarct. \par Echo  showed normal LV function 55-60% ,dilatation of aortic root 3.8 cm and 3.9 cm mild LVH. \par He uses to play golf, goes to the gym, plays racquetball over the last year \par had hemorrhoidal surgery 2 weeks and is recovering from that.\par \par

## 2019-09-24 NOTE — ASSESSMENT
[FreeTextEntry1] : CAD s/p prior anterior MI in 2010 s/p primary PCI to LAD using 2 ADRIAN \par Cath years ago showed patent stents continue ASA 81 mg, metoprolol 50 mg ER  , simvastatin 40 mg \par , continue lisinopril \par Echo showed preserved EF 55% with RWMA consistent with prior anterior MI (echo in 2019)\par Nuclear stress test showed large fixed defect in the mid anteroseptal and apical inferior segment.  (done in 2018) , showed no ischemia. \par HTN controlled, 132/74 continue  metoprolol 50 mg ER  , simvastatin 40 mg \par , continue lisinopril \par Absent PT pulses b/l , will order DEEJAY with PVR\par

## 2019-09-24 NOTE — PHYSICAL EXAM
[Normal Appearance] : normal appearance [General Appearance - Well Developed] : well developed [Well Groomed] : well groomed [General Appearance - Well Nourished] : well nourished [Normal Conjunctiva] : the conjunctiva exhibited no abnormalities [Normal Oral Mucosa] : normal oral mucosa [No Oral Pallor] : no oral pallor [No Oral Cyanosis] : no oral cyanosis [Normal Jugular Venous A Waves Present] : normal jugular venous A waves present [Normal Jugular Venous V Waves Present] : normal jugular venous V waves present [Heart Sounds] : normal S1 and S2 [Murmurs] : no murmurs present [Arterial Pulses Normal] : the arterial pulses were normal [] : no respiratory distress [Auscultation Breath Sounds / Voice Sounds] : lungs were clear to auscultation bilaterally [Abdomen Soft] : soft [Abdomen Tenderness] : non-tender [Nail Clubbing] : no clubbing of the fingernails [Cyanosis, Localized] : no localized cyanosis [Abnormal Walk] : normal gait [Skin Turgor] : normal skin turgor [Skin Lesions] : no skin lesions [No Venous Stasis] : no venous stasis [Oriented To Time, Place, And Person] : oriented to person, place, and time [Affect] : the affect was normal [Mood] : the mood was normal [FreeTextEntry1] : +DP bilaterally and PT not felt bilaterally

## 2019-10-24 ENCOUNTER — APPOINTMENT (OUTPATIENT)
Dept: CARDIOLOGY | Facility: CLINIC | Age: 69
End: 2019-10-24
Payer: COMMERCIAL

## 2019-10-24 PROCEDURE — 93923 UPR/LXTR ART STDY 3+ LVLS: CPT

## 2020-03-11 ENCOUNTER — APPOINTMENT (OUTPATIENT)
Dept: CARDIOLOGY | Facility: CLINIC | Age: 70
End: 2020-03-11
Payer: COMMERCIAL

## 2020-03-11 ENCOUNTER — NON-APPOINTMENT (OUTPATIENT)
Age: 70
End: 2020-03-11

## 2020-03-11 VITALS
DIASTOLIC BLOOD PRESSURE: 70 MMHG | OXYGEN SATURATION: 95 % | WEIGHT: 220 LBS | BODY MASS INDEX: 32.58 KG/M2 | HEIGHT: 69 IN | HEART RATE: 85 BPM | SYSTOLIC BLOOD PRESSURE: 152 MMHG

## 2020-03-11 VITALS — SYSTOLIC BLOOD PRESSURE: 132 MMHG | DIASTOLIC BLOOD PRESSURE: 70 MMHG

## 2020-03-11 PROCEDURE — 93000 ELECTROCARDIOGRAM COMPLETE: CPT

## 2020-03-11 PROCEDURE — 99214 OFFICE O/P EST MOD 30 MIN: CPT

## 2020-03-11 NOTE — PHYSICAL EXAM
[General Appearance - Well Developed] : well developed [General Appearance - Well Nourished] : well nourished [Normal Conjunctiva] : the conjunctiva exhibited no abnormalities [] : no respiratory distress [Respiration, Rhythm And Depth] : normal respiratory rhythm and effort [Auscultation Breath Sounds / Voice Sounds] : lungs were clear to auscultation bilaterally [Heart Rate And Rhythm] : heart rate and rhythm were normal [Heart Sounds] : normal S1 and S2 [Murmurs] : no murmurs present [Arterial Pulses Normal] : the arterial pulses were normal [Edema] : no peripheral edema present [Abdomen Soft] : soft [Abdomen Tenderness] : non-tender [Abnormal Walk] : normal gait [Oriented To Time, Place, And Person] : oriented to person, place, and time

## 2020-03-16 NOTE — HISTORY OF PRESENT ILLNESS
[FreeTextEntry1] : Mr. 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 \par \par \par Currently he reports he has felt well.  Denies chest pain,  SOB, palpitations, dizziness, or syncope.\par \par Stress test in 2018 showed large fixed defect consistent with prior infarct. \par Echo showed normal LV function 55-60% ,dilatation of aortic root 3.8 cm and 3.9 cm mild LVH. \par He has been attempting to increase activity, walking a few times/week. \par \par During last follow up pedal pulses were absent, DEEJAY with PVR was performed which was within normal limits. \par

## 2020-03-16 NOTE — DISCUSSION/SUMMARY
[FreeTextEntry1] : - Cad s/p anterior MI in 2010 s/p primary PCI to LAD using 2 ADRIAN,Cath few years ago showed patent stents. Continue aspirin 81mg daily metoprolol 50mg QHS, simvistatin 40mg \par - History of mild dilatation of the descending thoracic aorta,  dilation of aortic root 3.8 cm, and 3.9 cm and hypertension- not at goal. Today upon first assessment 150's, patient reports 130-150's at home. 's during September follow up at another office.  Continue losartan 100 mg daily, hesitant to titrate up metoprolol due to patient complained of fatigue in past when taken during day. Will add norvasc 5 mg daily to optimipze BP control. patient will return for BP check in 2 weeks and follow up with Dr. Quiroz in 6 months \par \par Paige ANN-C

## 2020-03-16 NOTE — REVIEW OF SYSTEMS
[Headache] : no headache [Feeling Fatigued] : not feeling fatigued [Shortness Of Breath] : no shortness of breath [Dyspnea on exertion] : not dyspnea during exertion [Chest  Pressure] : no chest pressure [Chest Pain] : no chest pain [Lower Ext Edema] : no extremity edema [Palpitations] : no palpitations [Cough] : no cough [Nausea] : no nausea [Vomiting] : no vomiting [Dizziness] : no dizziness [Confusion] : no confusion was observed [Easy Bleeding] : no tendency for easy bleeding [Easy Bruising] : no tendency for easy bruising

## 2020-05-20 VITALS
DIASTOLIC BLOOD PRESSURE: 72 MMHG | HEART RATE: 78 BPM | SYSTOLIC BLOOD PRESSURE: 136 MMHG | RESPIRATION RATE: 16 BRPM | OXYGEN SATURATION: 98 %

## 2020-05-27 VITALS
SYSTOLIC BLOOD PRESSURE: 142 MMHG | RESPIRATION RATE: 18 BRPM | OXYGEN SATURATION: 96 % | DIASTOLIC BLOOD PRESSURE: 72 MMHG | HEART RATE: 72 BPM

## 2020-05-27 VITALS — SYSTOLIC BLOOD PRESSURE: 138 MMHG | DIASTOLIC BLOOD PRESSURE: 76 MMHG

## 2020-07-01 ENCOUNTER — APPOINTMENT (OUTPATIENT)
Dept: CARDIOLOGY | Facility: CLINIC | Age: 70
End: 2020-07-01
Payer: COMMERCIAL

## 2020-07-01 ENCOUNTER — NON-APPOINTMENT (OUTPATIENT)
Age: 70
End: 2020-07-01

## 2020-07-01 VITALS
HEIGHT: 69 IN | SYSTOLIC BLOOD PRESSURE: 158 MMHG | BODY MASS INDEX: 31.7 KG/M2 | HEART RATE: 78 BPM | OXYGEN SATURATION: 97 % | DIASTOLIC BLOOD PRESSURE: 82 MMHG | WEIGHT: 214 LBS | TEMPERATURE: 98.3 F

## 2020-07-01 PROCEDURE — 93000 ELECTROCARDIOGRAM COMPLETE: CPT

## 2020-07-01 PROCEDURE — 99214 OFFICE O/P EST MOD 30 MIN: CPT

## 2020-07-02 NOTE — ED ADULT NURSE NOTE - CAS DISCH CONDITION
Pt is scheduled on Monday 7/6. I sent you an instant message earlier on this, just want to make sure pt is aware of appointment.   Improved

## 2020-07-07 ENCOUNTER — APPOINTMENT (OUTPATIENT)
Dept: COLORECTAL SURGERY | Facility: CLINIC | Age: 70
End: 2020-07-07
Payer: COMMERCIAL

## 2020-07-07 VITALS
BODY MASS INDEX: 31.7 KG/M2 | HEIGHT: 69 IN | SYSTOLIC BLOOD PRESSURE: 175 MMHG | HEART RATE: 92 BPM | TEMPERATURE: 98.5 F | DIASTOLIC BLOOD PRESSURE: 76 MMHG | WEIGHT: 214 LBS

## 2020-07-07 PROCEDURE — 46221 LIGATION OF HEMORRHOID(S): CPT

## 2020-07-07 PROCEDURE — 99214 OFFICE O/P EST MOD 30 MIN: CPT | Mod: 25

## 2020-07-07 NOTE — PROCEDURE
[FreeTextEntry1] : Procedure:\par The risks and benefits of the rubber band ligation were discussed with the patient. This included but was not limited to bleeding and sepsis the feeling of fullness in the rectum and the anticipated bleeding when the band falls off in 5-7 days.The hemorrhoid was grasped with the hemorrhoidal grasper. There was no sensation. A standard rubber band was applied to this hemorrhoid. The patient tolerated the procedure well without any complications.\par \par

## 2020-07-07 NOTE — HISTORY OF PRESENT ILLNESS
[FreeTextEntry1] : Here for POV.\par Had significant anorectal pain and pressure as anticipated for the first two weeks postop. \par Pain has now subsided.\par Stools are soft, but still feels that there is a blockage and cannot evacuate his stools without digitizing.\par \par 7/7/20\par Mr. Benavidez returns to the office for follow-up of his internal hemorrhoids.  \par 8/29/19 Suture ligation of internal hemorrhoids\par He reports noticing hemorrhoidal fullness in the right lateral aspect of his anal canal which he believes is impairing his ability to evacuate stools.  He admits to noncompliance with a high-fiber diet though reports drinking ample amounts of water.  No issues with rectal bleeding reported.\par He and his wife have been healthy and COVID free since the start of the pandemic.

## 2020-07-07 NOTE — ASSESSMENT
[FreeTextEntry1] :  \par Looks well and recovered uneventfully.\par Cont to report difficulty with evacuating stool.\par Suspect pelvic floor dysfunction and have recommended MRI defecography.\par He wants to revisit bowel regimen to ensure that he is bulking up his stools appropriately before doing more studies.\par Should there be no improvement over the course of a month, will return to office, and will be more agreeable to pursue defecography.\par \par 7/7/20  presents to the office for a follow-up visit.  In office today, we proceeded to rubber band ligate his right lateral hemorrhoidal column secondary to reports of swelling and discomfort.  He was reminded on the importance of adhering to a high-fiber diet with ample water intake in order to achieve a healthy and consistent bowel regimen.  He was reminded on what to expect post rubber band ligation, and can return to the office for repeat ligations as needed.

## 2020-07-07 NOTE — PHYSICAL EXAM
[Normal rectal exam] : exam was normal [Skin Tags] : there were no residual hemorrhoidal skin tags seen [Reduce Spontaneously] : a spontaneously reducible (grade II) [Normal] : was normal [None] : there was no rectal mass  [Gross Blood] : no gross blood [No Rash or Lesion] : No rash or lesion [Alert] : alert [Oriented to Person] : oriented to person [Oriented to Place] : oriented to place [Oriented to Time] : oriented to time [Calm] : calm [de-identified] : mild pruritus ani [de-identified] : No apparent distress [de-identified] : Normocephalic atraumatic [de-identified] : Moving all extremities x4

## 2020-07-27 ENCOUNTER — APPOINTMENT (OUTPATIENT)
Dept: CARDIOLOGY | Facility: CLINIC | Age: 70
End: 2020-07-27
Payer: COMMERCIAL

## 2020-07-27 PROCEDURE — 93306 TTE W/DOPPLER COMPLETE: CPT

## 2020-09-16 ENCOUNTER — APPOINTMENT (OUTPATIENT)
Dept: CARDIOLOGY | Facility: CLINIC | Age: 70
End: 2020-09-16
Payer: COMMERCIAL

## 2020-09-16 VITALS
OXYGEN SATURATION: 99 % | BODY MASS INDEX: 31.6 KG/M2 | HEART RATE: 70 BPM | DIASTOLIC BLOOD PRESSURE: 80 MMHG | SYSTOLIC BLOOD PRESSURE: 158 MMHG | TEMPERATURE: 97 F | WEIGHT: 214 LBS | RESPIRATION RATE: 18 BRPM

## 2020-09-16 VITALS — SYSTOLIC BLOOD PRESSURE: 138 MMHG | DIASTOLIC BLOOD PRESSURE: 76 MMHG

## 2020-09-16 DIAGNOSIS — R60.0 LOCALIZED EDEMA: ICD-10-CM

## 2020-09-16 PROCEDURE — 99215 OFFICE O/P EST HI 40 MIN: CPT

## 2020-09-16 NOTE — HISTORY OF PRESENT ILLNESS
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 He presents for preop cardiac risk stratification for upcoming lipoma removal 7/2/2020 at [facility]. He denies chest pain, SOB, edema, palpitations, dizziness, near syncope or syncope. He can climb multiple flights of stairs without CP or dyspnea. Stress test in 2018 showed large fixed defect consistent with prior infarct. Echo done 6/2019 showed normal LV function 55-60% ,dilatation of aortic root 3.8 cm and 3.9 cm mild LVH. \par \par 9/16/2020 \par Returns for follow \par felt like blood gush to his feet yesterday, + tenderness , resolved by rubbing it , right LE swelling \par no SOB or chest pain, \par Echo showed EF 47% , dilatation of the aorta but no increase in size from prior echo

## 2020-09-16 NOTE — PHYSICAL EXAM
[General Appearance - Well Developed] : well developed [Normal Appearance] : normal appearance [Well Groomed] : well groomed [General Appearance - In No Acute Distress] : no acute distress [General Appearance - Well Nourished] : well nourished [Normal Jugular Venous A Waves Present] : normal jugular venous A waves present [Normal Jugular Venous V Waves Present] : normal jugular venous V waves present [Respiration, Rhythm And Depth] : normal respiratory rhythm and effort [] : no respiratory distress [Heart Sounds] : normal S1 and S2 [Auscultation Breath Sounds / Voice Sounds] : lungs were clear to auscultation bilaterally [Heart Rate And Rhythm] : heart rate and rhythm were normal [Murmurs] : no murmurs present [Abnormal Walk] : normal gait [Abdomen Soft] : soft [Skin Turgor] : normal skin turgor [Cyanosis, Localized] : no localized cyanosis [Nail Clubbing] : no clubbing of the fingernails [Skin Lesions] : no skin lesions [Oriented To Time, Place, And Person] : oriented to person, place, and time [No Venous Stasis] : no venous stasis [Affect] : the affect was normal [Mood] : the mood was normal [Normal Conjunctiva] : the conjunctiva exhibited no abnormalities [FreeTextEntry1] : +S4. right LE edema

## 2020-09-16 NOTE — ASSESSMENT
[FreeTextEntry1] : CAD s/p prior anterior MI in 2010 s/p primary PCI to LAD using 2 ADRIAN \par Cath years ago showed patent stents continue ASA 81 mg, metoprolol 50 mg ER  , simvastatin 40 mg \par , continue losartan 100 mg\par \par Nuclear stress test showed large fixed defect in the mid anteroseptal and apical inferior segment.  (done in 2018) , showed no ischemia. \par \par Echo showed EF 50-55% with LAD infarct\par \par HTN controlled, 158/60 continue  metoprolol 50 mg ER  , simvastatin 40 mg \par , continue lisinopril \par \par Right LE Edema, and poping sensation yesterday with eventual gush of blood as described by patient \par \par

## 2020-09-17 ENCOUNTER — APPOINTMENT (OUTPATIENT)
Dept: ULTRASOUND IMAGING | Facility: CLINIC | Age: 70
End: 2020-09-17
Payer: COMMERCIAL

## 2020-09-17 ENCOUNTER — OUTPATIENT (OUTPATIENT)
Dept: OUTPATIENT SERVICES | Facility: HOSPITAL | Age: 70
LOS: 1 days | End: 2020-09-17
Payer: COMMERCIAL

## 2020-09-17 ENCOUNTER — RESULT REVIEW (OUTPATIENT)
Age: 70
End: 2020-09-17

## 2020-09-17 DIAGNOSIS — Z98.89 OTHER SPECIFIED POSTPROCEDURAL STATES: Chronic | ICD-10-CM

## 2020-09-17 DIAGNOSIS — Z09 ENCOUNTER FOR FOLLOW-UP EXAMINATION AFTER COMPLETED TREATMENT FOR CONDITIONS OTHER THAN MALIGNANT NEOPLASM: Chronic | ICD-10-CM

## 2020-09-17 DIAGNOSIS — R60.0 LOCALIZED EDEMA: ICD-10-CM

## 2020-09-17 DIAGNOSIS — Z95.5 PRESENCE OF CORONARY ANGIOPLASTY IMPLANT AND GRAFT: Chronic | ICD-10-CM

## 2020-09-17 DIAGNOSIS — Z95.0 PRESENCE OF CARDIAC PACEMAKER: Chronic | ICD-10-CM

## 2020-09-17 PROCEDURE — 93970 EXTREMITY STUDY: CPT | Mod: 26

## 2020-09-17 PROCEDURE — 93970 EXTREMITY STUDY: CPT

## 2021-03-17 ENCOUNTER — APPOINTMENT (OUTPATIENT)
Dept: COLORECTAL SURGERY | Facility: CLINIC | Age: 71
End: 2021-03-17
Payer: COMMERCIAL

## 2021-03-17 VITALS
DIASTOLIC BLOOD PRESSURE: 78 MMHG | SYSTOLIC BLOOD PRESSURE: 143 MMHG | BODY MASS INDEX: 31.7 KG/M2 | RESPIRATION RATE: 16 BRPM | HEIGHT: 69 IN | WEIGHT: 214 LBS | TEMPERATURE: 97.2 F

## 2021-03-17 DIAGNOSIS — L29.0 PRURITUS ANI: ICD-10-CM

## 2021-03-17 PROCEDURE — 99072 ADDL SUPL MATRL&STAF TM PHE: CPT

## 2021-03-17 PROCEDURE — 99213 OFFICE O/P EST LOW 20 MIN: CPT | Mod: 25

## 2021-03-17 PROCEDURE — 46221 LIGATION OF HEMORRHOID(S): CPT

## 2021-03-17 NOTE — PHYSICAL EXAM
[Normal rectal exam] : exam was normal [Reduce Spontaneously] : a spontaneously reducible (grade II) [Normal] : was normal [None] : there was no rectal mass  [No Rash or Lesion] : No rash or lesion [Alert] : alert [Oriented to Person] : oriented to person [Oriented to Place] : oriented to place [Oriented to Time] : oriented to time [Calm] : calm [Skin Tags] : there were no residual hemorrhoidal skin tags seen [Gross Blood] : no gross blood [de-identified] : macerated perianal skin with associated tenderness [de-identified] : No apparent distress [de-identified] : Normocephalic atraumatic [de-identified] : Moving all extremities x4

## 2021-03-17 NOTE — ASSESSMENT
[FreeTextEntry1] :  \tana Looks well and recovered uneventfully.\par Cont to report difficulty with evacuating stool.\par Suspect pelvic floor dysfunction and have recommended MRI defecography.\par He wants to revisit bowel regimen to ensure that he is bulking up his stools appropriately before doing more studies.\par Should there be no improvement over the course of a month, will return to office, and will be more agreeable to pursue defecography.\par \par 7/7/20  presents to the office for a follow-up visit.  In office today, we proceeded to rubber band ligate his right lateral hemorrhoidal column secondary to reports of swelling and discomfort.  He was reminded on the importance of adhering to a high-fiber diet with ample water intake in order to achieve a healthy and consistent bowel regimen.  He was reminded on what to expect post rubber band ligation, and can return to the office for repeat ligations as needed.\par \par 3/17/21 Mr. Benavidez presents to the office for a followup visit.  He recently had a Covid vaccine and shortly afterwards developed diarrhea with hemorrhoidal exacerbation.  This responded well to hydrocortisone suppositories as well as cream.  In office today, physical exam was remarkable for fairly erythematous, tender and macerated perianal skin.  He did have mildly engorged internal hemorrhoids, and we proceeded to rubber band ligate a posterior column.  He was advised to avoid rigorous perianal hygiene and calmoseptine was applied at the bedside.  I will renew the Anusol suppositories as well as the hydrocortisone creams.  He was advised to purchase over-the-counter calmoseptine for soothing relief.  Patient was reassured and wife was updated over the phone.

## 2021-03-17 NOTE — HISTORY OF PRESENT ILLNESS
[FreeTextEntry1] : Here for POV.\par Had significant anorectal pain and pressure as anticipated for the first two weeks postop. \par Pain has now subsided.\par Stools are soft, but still feels that there is a blockage and cannot evacuate his stools without digitizing.\par \par 7/7/20\par Mr. Benavidez returns to the office for follow-up of his internal hemorrhoids.  \par 8/29/19 Suture ligation of internal hemorrhoids\par He reports noticing hemorrhoidal fullness in the right lateral aspect of his anal canal which he believes is impairing his ability to evacuate stools.  He admits to noncompliance with a high-fiber diet though reports drinking ample amounts of water.  No issues with rectal bleeding reported.\par He and his wife have been healthy and COVID free since the start of the pandemic.\par \par 3/17/21 Mr. Benavidez returns to the office for consultation. He reports that after receiving his second covid shot on Sunday, he developed significant diarrhea with resultant pain, swelling and bleeding. He used several hydrocortisone suppositories as well as the cream and had resultant improvement in symptoms but not complete resolution. There is no rectal bleeding or diarrhea, but he has continued pain, itching and discomfort.

## 2021-07-30 ENCOUNTER — APPOINTMENT (OUTPATIENT)
Dept: COLORECTAL SURGERY | Facility: CLINIC | Age: 71
End: 2021-07-30
Payer: COMMERCIAL

## 2021-07-30 VITALS
OXYGEN SATURATION: 96 % | WEIGHT: 214 LBS | RESPIRATION RATE: 16 BRPM | HEIGHT: 69 IN | BODY MASS INDEX: 31.7 KG/M2 | SYSTOLIC BLOOD PRESSURE: 160 MMHG | DIASTOLIC BLOOD PRESSURE: 76 MMHG | HEART RATE: 83 BPM | TEMPERATURE: 96.6 F

## 2021-07-30 DIAGNOSIS — Z12.11 ENCOUNTER FOR SCREENING FOR MALIGNANT NEOPLASM OF COLON: ICD-10-CM

## 2021-07-30 PROCEDURE — 99214 OFFICE O/P EST MOD 30 MIN: CPT

## 2021-07-30 NOTE — ASSESSMENT
[FreeTextEntry1] : Looks well and recovered uneventfully.\par Cont to report difficulty with evacuating stool.\par Suspect pelvic floor dysfunction and have recommended MRI defecography.\par He wants to revisit bowel regimen to ensure that he is bulking up his stools appropriately before doing more studies.\par Should there be no improvement over the course of a month, will return to office, and will be more agreeable to pursue defecography.\par \par 7/7/20  presents to the office for a follow-up visit.  In office today, we proceeded to rubber band ligate his right lateral hemorrhoidal column secondary to reports of swelling and discomfort.  He was reminded on the importance of adhering to a high-fiber diet with ample water intake in order to achieve a healthy and consistent bowel regimen.  He was reminded on what to expect post rubber band ligation, and can return to the office for repeat ligations as needed.\par \par 3/17/21 Mr. Benavidez presents to the office for a followup visit.  He recently had a Covid vaccine and shortly afterwards developed diarrhea with hemorrhoidal exacerbation.  This responded well to hydrocortisone suppositories as well as cream.  In office today, physical exam was remarkable for fairly erythematous, tender and macerated perianal skin.  He did have mildly engorged internal hemorrhoids, and we proceeded to rubber band ligate a posterior column.  He was advised to avoid rigorous perianal hygiene and calmoseptine was applied at the bedside.  I will renew the Anusol suppositories as well as the hydrocortisone creams.  He was advised to purchase over-the-counter calmoseptine for soothing relief.  Patient was reassured and wife was updated over the phone.\par \par 7/30/21 Mr. Benavidez returns to the office to discuss screening colonoscopy.\par The risks/benefits/alternatives for a colonoscopy were discussed. These include a less than 1% risk of bleeding should any polyps be biopsied and/or removed. There is also a less than 0.1% risk of perforation. The patient understands the need to adhere to a clear liquid diet the day prior to procedure as well as having to perform a bowel prep in order to allow for adequate visualization of the mucosal surfaces.  Followup colonoscopies will be scheduled based on the findings that are seen at the time of the procedure. Patient understands and is agreeable, and will proceed with consent and scheduling.\par

## 2021-07-30 NOTE — PHYSICAL EXAM
[Normal rectal exam] : exam was normal [Reduce Spontaneously] : a spontaneously reducible (grade II) [Skin Tags] : there were no residual hemorrhoidal skin tags seen [Normal] : was normal [None] : there was no rectal mass  [Gross Blood] : no gross blood [No Rash or Lesion] : No rash or lesion [Alert] : alert [Oriented to Person] : oriented to person [Oriented to Place] : oriented to place [Oriented to Time] : oriented to time [Calm] : calm [de-identified] : soft, NTND [de-identified] : macerated perianal skin with associated tenderness [de-identified] : No apparent distress [de-identified] : Normocephalic atraumatic [de-identified] : Moving all extremities x4

## 2021-07-30 NOTE — HISTORY OF PRESENT ILLNESS
[FreeTextEntry1] : Here for POV.\tana Had significant anorectal pain and pressure as anticipated for the first two weeks postop. \par Pain has now subsided.\par Stools are soft, but still feels that there is a blockage and cannot evacuate his stools without digitizing.\par \par 7/7/20\par Mr. Benavidez returns to the office for follow-up of his internal hemorrhoids.  \par 8/29/19 Suture ligation of internal hemorrhoids\par He reports noticing hemorrhoidal fullness in the right lateral aspect of his anal canal which he believes is impairing his ability to evacuate stools.  He admits to noncompliance with a high-fiber diet though reports drinking ample amounts of water.  No issues with rectal bleeding reported.\par He and his wife have been healthy and COVID free since the start of the pandemic.\par \par 3/17/21 Mr. Benavidez returns to the office for consultation. He reports that after receiving his second covid shot on Sunday, he developed significant diarrhea with resultant pain, swelling and bleeding. He used several hydrocortisone suppositories as well as the cream and had resultant improvement in symptoms but not complete resolution. There is no rectal bleeding or diarrhea, but he has continued pain, itching and discomfort.\par \par 7/30/21 Mr. Benavidez returns to the office for discussion of a screening colonoscopy.  He denies any abdominal pain, blood in his stool or recent change in bowel habits.  His last colonoscopy was 5 years earlier.

## 2021-08-14 ENCOUNTER — EMERGENCY (EMERGENCY)
Facility: HOSPITAL | Age: 71
LOS: 1 days | Discharge: HOME | End: 2021-08-14
Attending: EMERGENCY MEDICINE
Payer: COMMERCIAL

## 2021-08-14 VITALS
SYSTOLIC BLOOD PRESSURE: 165 MMHG | DIASTOLIC BLOOD PRESSURE: 74 MMHG | OXYGEN SATURATION: 96 % | HEART RATE: 92 BPM | HEIGHT: 67 IN | RESPIRATION RATE: 22 BRPM | WEIGHT: 216.05 LBS | TEMPERATURE: 99 F

## 2021-08-14 VITALS
OXYGEN SATURATION: 98 % | DIASTOLIC BLOOD PRESSURE: 64 MMHG | RESPIRATION RATE: 18 BRPM | TEMPERATURE: 99 F | SYSTOLIC BLOOD PRESSURE: 128 MMHG | HEART RATE: 77 BPM

## 2021-08-14 DIAGNOSIS — Z09 ENCOUNTER FOR FOLLOW-UP EXAMINATION AFTER COMPLETED TREATMENT FOR CONDITIONS OTHER THAN MALIGNANT NEOPLASM: Chronic | ICD-10-CM

## 2021-08-14 DIAGNOSIS — Z98.89 OTHER SPECIFIED POSTPROCEDURAL STATES: Chronic | ICD-10-CM

## 2021-08-14 DIAGNOSIS — Z95.5 PRESENCE OF CORONARY ANGIOPLASTY IMPLANT AND GRAFT: Chronic | ICD-10-CM

## 2021-08-14 DIAGNOSIS — Z95.0 PRESENCE OF CARDIAC PACEMAKER: Chronic | ICD-10-CM

## 2021-08-14 LAB
ALBUMIN SERPL ELPH-MCNC: 4.1 G/DL — SIGNIFICANT CHANGE UP (ref 3.3–5.2)
ALP SERPL-CCNC: 82 U/L — SIGNIFICANT CHANGE UP (ref 40–120)
ALT FLD-CCNC: 24 U/L — SIGNIFICANT CHANGE UP
ANION GAP SERPL CALC-SCNC: 9 MMOL/L — SIGNIFICANT CHANGE UP (ref 5–17)
AST SERPL-CCNC: 24 U/L — SIGNIFICANT CHANGE UP
BASOPHILS # BLD AUTO: 0.11 K/UL — SIGNIFICANT CHANGE UP (ref 0–0.2)
BASOPHILS NFR BLD AUTO: 1.1 % — SIGNIFICANT CHANGE UP (ref 0–2)
BILIRUB SERPL-MCNC: 0.6 MG/DL — SIGNIFICANT CHANGE UP (ref 0.4–2)
BUN SERPL-MCNC: 22.2 MG/DL — HIGH (ref 8–20)
CALCIUM SERPL-MCNC: 8.9 MG/DL — SIGNIFICANT CHANGE UP (ref 8.6–10.2)
CHLORIDE SERPL-SCNC: 105 MMOL/L — SIGNIFICANT CHANGE UP (ref 98–107)
CO2 SERPL-SCNC: 29 MMOL/L — SIGNIFICANT CHANGE UP (ref 22–29)
CREAT SERPL-MCNC: 1.69 MG/DL — HIGH (ref 0.5–1.3)
CRP SERPL-MCNC: <4 MG/L — SIGNIFICANT CHANGE UP
EOSINOPHIL # BLD AUTO: 0.45 K/UL — SIGNIFICANT CHANGE UP (ref 0–0.5)
EOSINOPHIL NFR BLD AUTO: 4.3 % — SIGNIFICANT CHANGE UP (ref 0–6)
ERYTHROCYTE [SEDIMENTATION RATE] IN BLOOD: 18 MM/HR — SIGNIFICANT CHANGE UP (ref 0–20)
GLUCOSE SERPL-MCNC: 98 MG/DL — SIGNIFICANT CHANGE UP (ref 70–99)
HCT VFR BLD CALC: 42.8 % — SIGNIFICANT CHANGE UP (ref 39–50)
HGB BLD-MCNC: 14.7 G/DL — SIGNIFICANT CHANGE UP (ref 13–17)
IMM GRANULOCYTES NFR BLD AUTO: 1.1 % — SIGNIFICANT CHANGE UP (ref 0–1.5)
LYMPHOCYTES # BLD AUTO: 19.1 % — SIGNIFICANT CHANGE UP (ref 13–44)
LYMPHOCYTES # BLD AUTO: 2 K/UL — SIGNIFICANT CHANGE UP (ref 1–3.3)
MCHC RBC-ENTMCNC: 29.4 PG — SIGNIFICANT CHANGE UP (ref 27–34)
MCHC RBC-ENTMCNC: 34.3 GM/DL — SIGNIFICANT CHANGE UP (ref 32–36)
MCV RBC AUTO: 85.6 FL — SIGNIFICANT CHANGE UP (ref 80–100)
MONOCYTES # BLD AUTO: 1.13 K/UL — HIGH (ref 0–0.9)
MONOCYTES NFR BLD AUTO: 10.8 % — SIGNIFICANT CHANGE UP (ref 2–14)
NEUTROPHILS # BLD AUTO: 6.67 K/UL — SIGNIFICANT CHANGE UP (ref 1.8–7.4)
NEUTROPHILS NFR BLD AUTO: 63.6 % — SIGNIFICANT CHANGE UP (ref 43–77)
PLATELET # BLD AUTO: 215 K/UL — SIGNIFICANT CHANGE UP (ref 150–400)
POTASSIUM SERPL-MCNC: 4.2 MMOL/L — SIGNIFICANT CHANGE UP (ref 3.5–5.3)
POTASSIUM SERPL-SCNC: 4.2 MMOL/L — SIGNIFICANT CHANGE UP (ref 3.5–5.3)
PROT SERPL-MCNC: 7 G/DL — SIGNIFICANT CHANGE UP (ref 6.6–8.7)
RBC # BLD: 5 M/UL — SIGNIFICANT CHANGE UP (ref 4.2–5.8)
RBC # FLD: 13.2 % — SIGNIFICANT CHANGE UP (ref 10.3–14.5)
SODIUM SERPL-SCNC: 143 MMOL/L — SIGNIFICANT CHANGE UP (ref 135–145)
TROPONIN T SERPL-MCNC: <0.01 NG/ML — SIGNIFICANT CHANGE UP (ref 0–0.06)
WBC # BLD: 10.47 K/UL — SIGNIFICANT CHANGE UP (ref 3.8–10.5)
WBC # FLD AUTO: 10.47 K/UL — SIGNIFICANT CHANGE UP (ref 3.8–10.5)

## 2021-08-14 PROCEDURE — 84484 ASSAY OF TROPONIN QUANT: CPT

## 2021-08-14 PROCEDURE — 99284 EMERGENCY DEPT VISIT MOD MDM: CPT | Mod: 25

## 2021-08-14 PROCEDURE — 85025 COMPLETE CBC W/AUTO DIFF WBC: CPT

## 2021-08-14 PROCEDURE — 36415 COLL VENOUS BLD VENIPUNCTURE: CPT

## 2021-08-14 PROCEDURE — 70450 CT HEAD/BRAIN W/O DYE: CPT | Mod: MA

## 2021-08-14 PROCEDURE — 99284 EMERGENCY DEPT VISIT MOD MDM: CPT

## 2021-08-14 PROCEDURE — 86140 C-REACTIVE PROTEIN: CPT

## 2021-08-14 PROCEDURE — 85652 RBC SED RATE AUTOMATED: CPT

## 2021-08-14 PROCEDURE — 70450 CT HEAD/BRAIN W/O DYE: CPT | Mod: 26,MA

## 2021-08-14 PROCEDURE — 80053 COMPREHEN METABOLIC PANEL: CPT

## 2021-08-14 NOTE — ED ADULT TRIAGE NOTE - CHIEF COMPLAINT QUOTE
I have this pulsating in my temple for the past month, it has gotten worse and now feels heavy behind my eye. denies any visual disturbances

## 2021-08-14 NOTE — ED PROVIDER NOTE - CLINICAL SUMMARY MEDICAL DECISION MAKING FREE TEXT BOX
PT. with mild paresthesia and pulsating sensation to right upper side of his face. Pt. with no rash/lesions noted to face. No signs of shingles. Spoke to the patient and wife about possible early signs of bell's Palsy and they should return to the ER if any worsening symptoms.

## 2021-08-14 NOTE — ED PROVIDER NOTE - NSICDXPASTMEDICALHX_GEN_ALL_CORE_FT
PAST MEDICAL HISTORY:  CAD S/P percutaneous coronary angioplasty stents x 2    Deaf     High cholesterol     [place] (hard of hearing)     Hyperlipemia     Hypertension     Hypertension     MI (myocardial infarction) may 2010 stents x 2    Paresthesia     Stented coronary artery x 2    TIA (transient ischemic attack)

## 2021-08-14 NOTE — ED ADULT NURSE NOTE - NSICDXPASTMEDICALHX_GEN_ALL_CORE_FT
PAST MEDICAL HISTORY:  CAD S/P percutaneous coronary angioplasty stents x 2    Deaf     High cholesterol     [place] of hearing[place]     Hyperlipemia     Hypertension     Hypertension     MI (myocardial infarction) may 2010 stents x 2    Paresthesia     Stented coronary artery x 2    TIA (transient ischemic attack)

## 2021-08-14 NOTE — ED PROVIDER NOTE - NSFOLLOWUPINSTRUCTIONS_ED_ALL_ED_FT
Follow up with your doctor. Return to ER if worsening symptoms. Follow up with your doctor. Return to ER if worsening symptoms.        Paresthesia    WHAT YOU NEED TO KNOW:    What is paresthesia? Paresthesia is numbness, tingling, or burning. It can happen in any part of your body, but usually occurs in your legs, feet, arms, or hands.    What causes paresthesia? A large number of conditions can cause paresthesia. Nerves that provide sensation are affected. Paresthesia happens because of changes in these nerves, or in nerve pathways. The changes can be temporary, such as if you take certain medicines or you are not getting enough vitamin B. Nerve damage can lead to permanent paresthesia. Conditions that may cause nerve damage include diabetes, carpel tunnel syndrome, stroke, and multiple sclerosis. The exact cause of your paresthesia may not be known.    What should I tell my healthcare provider about what I feel? You can help your healthcare provider by describing anything you feel, such as the following:   •No feeling in the affected area      •A feeling of pins and needles      •An electric shock feeling      •Heaviness      •Trouble moving the affected area      •A feeling that something is crawling under your skin      •A feeling of burning or of cold in the affected area      How is paresthesia diagnosed? Your healthcare provider will examine you and ask about your symptoms. Tell your provider when the symptoms began. Include anything that makes your symptoms worse or better. Your provider will also need to know if you have a disease or condition that could be causing your symptoms. Tell him or her about the medicines you take. Include the amounts you take and when you take each medicine. You may also need any of the following:   •Blood tests may show low levels of vitamin B or a high blood sugar level.      •X-ray, MRI, or CT scan pictures may show damage to the area where you have paresthesia. You may be given contrast liquid to help the area show up better in the pictures. Tell the healthcare provider if you have ever had an allergic reaction to contrast liquid. Do not enter the MRI room with anything metal. Metal can cause severe injury. Tell the healthcare provider if you have any metal in or on your body.      •Nerve conduction studies may be done to test your nerve function.      How is paresthesia treated? Treatment will depend on what is causing your paresthesia. You may need to increase the amount of vitamin B in your blood. Your healthcare provider may change or stop a medicine you are taking that is causing your symptoms. Permanent paresthesia may be helped with nerve medicine. If you have diabetes, your healthcare provider or diabetes specialist can help you control your blood sugar levels. Your provider may recommend a splint or surgery if you have paresthesia caused by carpal tunnel syndrome.    What can I do to manage paresthesia?   •Protect the area from injury. You may injure or burn yourself if you lose feeling in the area. Be careful when you touch anything that could be hot. Wear sturdy shoes to protect your feet. Ask about other ways to protect yourself.       •Go to physical or occupational therapy if directed. Your provider may recommend therapy if you have a condition such as carpal tunnel syndrome. A physical therapist can teach you exercises to help strengthen the area or increase your ability to move it. An occupational therapist can help you find new ways to do your daily activities.      •Manage health conditions that can cause paresthesia. Work with your diabetes specialist if you have uncontrolled diabetes. A dietitian or your healthcare provider can help you create a meal plan if you have low vitamin B levels. Your provider can help you manage your health if you have multiple sclerosis or you had a stroke. It is important to manage health conditions to stop paresthesia or prevent it from getting worse.      When should I seek immediate care?   •You have severe pain along with numbness and tingling.      •Your legs suddenly become cold. You have trouble moving your legs, and they ache.      •You have increased weakness in a part of your body.      •You have uncontrolled movements.      When should I contact my healthcare provider?   •Your symptoms do not improve.      •You have symptoms in more than one part of your body.      •You have questions or concerns about your condition or care.      CARE AGREEMENT:    You have the right to help plan your care. Learn about your health condition and how it may be treated. Discuss treatment options with your healthcare providers to decide what care you want to receive. You always have the right to refuse treatment.

## 2021-08-14 NOTE — ED PROVIDER NOTE - PATIENT PORTAL LINK FT
You can access the FollowMyHealth Patient Portal offered by [facility] by registering at the following website: [email or web address] By joining [email or web address]’s FollowMyHealth portal, you will also be able to view your health information using other applications (apps) compatible with our system.

## 2021-08-14 NOTE — ED STATDOCS - PROGRESS NOTE DETAILS
Sailaja RICHARDS for ED attending, Dr. Pedroza. 72 y/o male with PMHx of deafness, and MI in 2011 presents in ED for pulsating pain in right temple. Pt reports intermittent pulsating sensation on the right side of his head for a month, gradual numbness in that side of head and eye pain started today. Pt to be moved to main ED for further evaluation by another provider. Sailaja RICHARDS for ED attending, Dr. Pedroza. 70 y/o male with PMHx of deafness, and MI in 2011 presents in ED for pulsating pain in right temple. Pt reports intermittent pulsating sensation on the right side of his head for a month, gradual numbness in that side of head and eye pain started today. Patient also report 1 episode of chest pain.  Pt to be moved to main ED for further evaluation by another provider.

## 2021-08-14 NOTE — ED PROVIDER NOTE - NSICDXPASTSURGICALHX_GEN_ALL_CORE_FT
PAST SURGICAL HISTORY:  S/P appendectomy     S/P cardiac pacemaker procedure     S/P hernia repair     S/P umbilical hernia repair, follow-up exam     Stented coronary artery x 2

## 2021-08-19 ENCOUNTER — OUTPATIENT (OUTPATIENT)
Dept: OUTPATIENT SERVICES | Facility: HOSPITAL | Age: 71
LOS: 1 days | End: 2021-08-19
Payer: COMMERCIAL

## 2021-08-19 VITALS
TEMPERATURE: 98 F | DIASTOLIC BLOOD PRESSURE: 62 MMHG | SYSTOLIC BLOOD PRESSURE: 140 MMHG | HEART RATE: 73 BPM | WEIGHT: 213.41 LBS | RESPIRATION RATE: 16 BRPM | HEIGHT: 69 IN | OXYGEN SATURATION: 96 %

## 2021-08-19 DIAGNOSIS — Z29.9 ENCOUNTER FOR PROPHYLACTIC MEASURES, UNSPECIFIED: ICD-10-CM

## 2021-08-19 DIAGNOSIS — Z95.0 PRESENCE OF CARDIAC PACEMAKER: Chronic | ICD-10-CM

## 2021-08-19 DIAGNOSIS — Z01.818 ENCOUNTER FOR OTHER PREPROCEDURAL EXAMINATION: ICD-10-CM

## 2021-08-19 DIAGNOSIS — Z98.89 OTHER SPECIFIED POSTPROCEDURAL STATES: Chronic | ICD-10-CM

## 2021-08-19 DIAGNOSIS — Z95.5 PRESENCE OF CORONARY ANGIOPLASTY IMPLANT AND GRAFT: Chronic | ICD-10-CM

## 2021-08-19 DIAGNOSIS — Z91.89 OTHER SPECIFIED PERSONAL RISK FACTORS, NOT ELSEWHERE CLASSIFIED: ICD-10-CM

## 2021-08-19 DIAGNOSIS — Z98.890 OTHER SPECIFIED POSTPROCEDURAL STATES: Chronic | ICD-10-CM

## 2021-08-19 DIAGNOSIS — Z12.11 ENCOUNTER FOR SCREENING FOR MALIGNANT NEOPLASM OF COLON: ICD-10-CM

## 2021-08-19 DIAGNOSIS — Z09 ENCOUNTER FOR FOLLOW-UP EXAMINATION AFTER COMPLETED TREATMENT FOR CONDITIONS OTHER THAN MALIGNANT NEOPLASM: Chronic | ICD-10-CM

## 2021-08-19 DIAGNOSIS — Z87.19 PERSONAL HISTORY OF OTHER DISEASES OF THE DIGESTIVE SYSTEM: Chronic | ICD-10-CM

## 2021-08-19 LAB
ANION GAP SERPL CALC-SCNC: 12 MMOL/L — SIGNIFICANT CHANGE UP (ref 5–17)
BASOPHILS # BLD AUTO: 0.09 K/UL — SIGNIFICANT CHANGE UP (ref 0–0.2)
BASOPHILS NFR BLD AUTO: 1 % — SIGNIFICANT CHANGE UP (ref 0–2)
BUN SERPL-MCNC: 18.7 MG/DL — SIGNIFICANT CHANGE UP (ref 8–20)
CALCIUM SERPL-MCNC: 9.3 MG/DL — SIGNIFICANT CHANGE UP (ref 8.6–10.2)
CHLORIDE SERPL-SCNC: 103 MMOL/L — SIGNIFICANT CHANGE UP (ref 98–107)
CO2 SERPL-SCNC: 25 MMOL/L — SIGNIFICANT CHANGE UP (ref 22–29)
CREAT SERPL-MCNC: 1.52 MG/DL — HIGH (ref 0.5–1.3)
EOSINOPHIL # BLD AUTO: 0.31 K/UL — SIGNIFICANT CHANGE UP (ref 0–0.5)
EOSINOPHIL NFR BLD AUTO: 3.5 % — SIGNIFICANT CHANGE UP (ref 0–6)
GLUCOSE SERPL-MCNC: 90 MG/DL — SIGNIFICANT CHANGE UP (ref 70–99)
HCT VFR BLD CALC: 42.9 % — SIGNIFICANT CHANGE UP (ref 39–50)
HGB BLD-MCNC: 14.3 G/DL — SIGNIFICANT CHANGE UP (ref 13–17)
IMM GRANULOCYTES NFR BLD AUTO: 1.1 % — SIGNIFICANT CHANGE UP (ref 0–1.5)
LYMPHOCYTES # BLD AUTO: 1.66 K/UL — SIGNIFICANT CHANGE UP (ref 1–3.3)
LYMPHOCYTES # BLD AUTO: 18.5 % — SIGNIFICANT CHANGE UP (ref 13–44)
MCHC RBC-ENTMCNC: 28.8 PG — SIGNIFICANT CHANGE UP (ref 27–34)
MCHC RBC-ENTMCNC: 33.3 GM/DL — SIGNIFICANT CHANGE UP (ref 32–36)
MCV RBC AUTO: 86.5 FL — SIGNIFICANT CHANGE UP (ref 80–100)
MONOCYTES # BLD AUTO: 0.86 K/UL — SIGNIFICANT CHANGE UP (ref 0–0.9)
MONOCYTES NFR BLD AUTO: 9.6 % — SIGNIFICANT CHANGE UP (ref 2–14)
NEUTROPHILS # BLD AUTO: 5.93 K/UL — SIGNIFICANT CHANGE UP (ref 1.8–7.4)
NEUTROPHILS NFR BLD AUTO: 66.3 % — SIGNIFICANT CHANGE UP (ref 43–77)
PLATELET # BLD AUTO: 212 K/UL — SIGNIFICANT CHANGE UP (ref 150–400)
POTASSIUM SERPL-MCNC: 4.3 MMOL/L — SIGNIFICANT CHANGE UP (ref 3.5–5.3)
POTASSIUM SERPL-SCNC: 4.3 MMOL/L — SIGNIFICANT CHANGE UP (ref 3.5–5.3)
RBC # BLD: 4.96 M/UL — SIGNIFICANT CHANGE UP (ref 4.2–5.8)
RBC # FLD: 13 % — SIGNIFICANT CHANGE UP (ref 10.3–14.5)
SODIUM SERPL-SCNC: 140 MMOL/L — SIGNIFICANT CHANGE UP (ref 135–145)
WBC # BLD: 8.95 K/UL — SIGNIFICANT CHANGE UP (ref 3.8–10.5)
WBC # FLD AUTO: 8.95 K/UL — SIGNIFICANT CHANGE UP (ref 3.8–10.5)

## 2021-08-19 PROCEDURE — 85025 COMPLETE CBC W/AUTO DIFF WBC: CPT

## 2021-08-19 PROCEDURE — 93005 ELECTROCARDIOGRAM TRACING: CPT

## 2021-08-19 PROCEDURE — 36415 COLL VENOUS BLD VENIPUNCTURE: CPT

## 2021-08-19 PROCEDURE — 93010 ELECTROCARDIOGRAM REPORT: CPT

## 2021-08-19 PROCEDURE — 80048 BASIC METABOLIC PNL TOTAL CA: CPT

## 2021-08-19 PROCEDURE — G0463: CPT

## 2021-08-19 NOTE — H&P PST ADULT - NSANTHOSAYNRD_GEN_A_CORE
No. RIGOBERTO screening performed.  STOP BANG Legend: 0-2 = LOW Risk; 3-4 = INTERMEDIATE Risk; 5-8 = HIGH Risk

## 2021-08-19 NOTE — H&P PST ADULT - NSICDXFAMILYHX_GEN_ALL_CORE_FT
FAMILY HISTORY:  Father  Still living? No  FH: glaucoma, Age at diagnosis: Age Unknown  FH: heart disease, Age at diagnosis: Age Unknown  FH: kidney disease, Age at diagnosis: Age Unknown  FH: prostate cancer, Age at diagnosis: Age Unknown    Mother  Still living? No  FH: pancreatic cancer, Age at diagnosis: Age Unknown    Sibling  Still living? Yes, Estimated age: Age Unknown  FH: glaucoma, Age at diagnosis: Age Unknown

## 2021-08-19 NOTE — H&P PST ADULT - EKG AND INTERPRETATION
EKG NSR 74 BPM pending medical and cardiac clearance, pending final cardiac interpretation EKG NSR 74 BPM pending medical and cardiac clearance, pending final cardiac interpretation EKG faxed to PCP and Cardiology.

## 2021-08-19 NOTE — H&P PST ADULT - NSICDXPASTSURGICALHX_GEN_ALL_CORE_FT
PAST SURGICAL HISTORY:  History of ankle surgery left ankle pin    History of hemorrhoids procedure for hemorrhoids in past    S/P appendectomy     S/P cardiac pacemaker procedure     S/P hernia repair bilateral hernia of groin    S/P umbilical hernia repair, follow-up exam     Stented coronary artery x 2     PAST SURGICAL HISTORY:  History of ankle surgery left ankle pin    History of hemorrhoids procedure for hemorrhoids in past    S/P appendectomy     S/P hernia repair bilateral hernia of groin    S/P umbilical hernia repair, follow-up exam     Stented coronary artery x 2

## 2021-08-19 NOTE — H&P PST ADULT - NS MD HP INPLANTS MED DEV
left ankle pin CAD w/ stents x 2 left ankle pin, CAD w/ stents x 2, b/l inguinal hernia, umbilical hernia

## 2021-08-19 NOTE — H&P PST ADULT - OTHER CARE PROVIDERS
Dr. Lee [facility] [phone], Cardiology Dr. Florian Gage nephrology Dr. Semaj Burnette [phone] Dr. Lee/Dr Velasquez [facility] [phone], Cardiology Dr. Florian Gage nephrology Dr. Semaj Burnette [phone]

## 2021-08-19 NOTE — H&P PST ADULT - NSICDXPASTMEDICALHX_GEN_ALL_CORE_FT
PAST MEDICAL HISTORY:  BPH (benign prostatic hyperplasia)     CAD S/P percutaneous coronary angioplasty stents x 2    COVID-19 vaccine series completed Moderna x 2    Deaf     High cholesterol     [place] (hard of hearing) Understands lip reading and loud, low volume speech. Does not know sign language.    Hyperlipemia     Hypertension     Hypertension     MI (myocardial infarction) may 2010 stents x 2    Paresthesia     Stage 3 chronic kidney disease     Stented coronary artery x 2    TIA (transient ischemic attack)

## 2021-08-19 NOTE — H&P PST ADULT - PROBLEM SELECTOR PLAN 3
Medical and cardiology clearance pending for colonoscopy on 8/27/21 with Dr. Frida Bowen for screening for malignant neoplasm of colon.

## 2021-08-19 NOTE — H&P PST ADULT - SKIN
Bedside and Verbal shift change report given to [address] [place] [address] (oncoming nurse) by Kylee Oliver RN (offgoing nurse). Report included the following information SBAR, Procedure Summary, Intake/Output, MAR and Recent Results.
Bedside and Verbal shift change report given to [address] (oncoming nurse) by Trina Patterson RN (offgoing nurse). Report included the following information SBAR, Procedure Summary, Intake/Output, MAR and Recent Results.
TRANSFER - OUT REPORT:    Verbal report given to Naila STEVENSON(name) on BB Ana Sanders  being transferred to Mother/baby (unit) for routine progression of care       Report consisted of patients Situation, Background, Assessment and   Recommendations(SBAR). Information from the following report(s) SBAR, Kardex, Procedure Summary, Intake/Output and Recent Results was reviewed with the receiving nurse. Lines:       Opportunity for questions and clarification was provided.       Patient transported with:   Registered Nurse
No lesions; no rash

## 2021-08-19 NOTE — H&P PST ADULT - HISTORY OF PRESENT ILLNESS
70 y/o male presents today for PST secondary to upcoming colonoscopy on 8/27/21 with Dr. Frida Bowen for screening for malignant neoplasm of colon. Pt. states this is a 5 year check up. Pt. is [place], is able to read lips and understand slow, louder, low tone speech. Pt. agrees to exam-he does not know sign language. Pt. with history of HTN, HLD, MI, CKD III, CAD with stents x 2 2010 and BPH. Pt. is s/p moderna covid 19 vaccine x 2 8/24/21.  Denies CP or SOB.  72 y/o male presents today for PST secondary to upcoming colonoscopy on 8/27/21 with Dr. Frida Bowen for screening for malignant neoplasm of colon. Pt. states this is a 5 year check up. Pt. is [place], is able to read lips and understand slow, louder, low tone speech. Pt. agrees to exam-he does not know sign language. Pt. with history of HTN, HLD, MI, CKD III, CAD with stents x 2 2010 and BPH. Pt. is s/p moderna covid 19 vaccine x 2 8/24/21.  Pt. states he was recently in ER this past Joey 8/15/21 secondary to bulging veins in the right side of his head, states he felt blood running through, states work up was negative. Denies CP or SOB.  72 y/o male presents today for PST secondary to upcoming colonoscopy on 8/27/21 with Dr. Frida Bowen for screening for malignant neoplasm of colon. Pt. states this is a 5 year check up. Pt. is [place], is able to read lips and understand slow, louder, low tone speech. Pt. agrees to exam-he does not know sign language. Pt. with history of HTN, HLD, MI, CKD III, CAD with stents x 2 2010 and BPH. Pt. is s/p moderna covid 19 vaccine x 2 8/24/21.  Pt. states he was recently in ER this past Joey 8/15/21 secondary to bulging veins in the right side of his head, states he felt blood running through his head and had numbness. Per chart he was discharged from ER with diagnosis of parasthesia and advised to return if symptoms worsen.   Denies CP or SOB.  70 y/o male presents today for PST secondary to upcoming colonoscopy on 8/27/21 with Dr. Frida Bowen for screening for malignant neoplasm of colon. Pt. states this is a 5 year check up. Pt. is [place], is able to read lips and understand slow, louder, low tone speech. Pt. 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 Pt. with history of HTN, HLD, MI, CKD III, CAD with stents x 2 2010 and BPH. Pt. is s/p moderna covid 19 vaccine x 2 8/24/21.  Pt. states he was recently in ER this past Joey 8/15/21 secondary to bulging veins in the right side of his head, states he felt blood running through his head and had numbness. Per chart he was discharged from ER with diagnosis of parasthesia and advised to return if symptoms worsen.   Denies CP or SOB.

## 2021-08-19 NOTE — H&P PST ADULT - ASSESSMENT
70 y/o male presents today for PST secondary to upcoming colonoscopy on 8/27/21 with Dr. Frida Bowen for screening for malignant neoplasm of colon. Pt. states this is a 5 year check up. Pt. is [place], is able to read lips and understand slow, louder, low tone speech. Pt. with history of HTN, HLD, MI, CKD III, CAD with stents x 2 2010 and BPH. Pt. is s/p moderna covid 19 vaccine x 2 8/24/21.  Denies CP or SOB.  72 y/o male presents today for PST secondary to upcoming colonoscopy on 21 with Dr. Frida Bowen for screening for malignant neoplasm of colon. Pt. states this is a 5 year check up. Pt. is [place], is able to read lips and understand slow, louder, low tone speech. Pt. agrees to exam-he does not know sign language. Pt. 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 Pt. with history of HTN, HLD, MI, CKD III, CAD with stents x 2  and BPH. Pt. is s/p moderna covid 19 vaccine x 2 21.  Pt. states he was recently in ER this past Joey 8/15/21 secondary to bulging veins in the right side of his head, states he felt blood running through his head and had numbness. Per chart he was discharged from ER with diagnosis of parasthesia and advised to return if symptoms worsen.   Denies CP or SOB.     Pt. to have medical clearance with Dr. Velasquez 21 6pm. Pt. verbalized agreement and understanding.   Pt. to have cardiac clearance with Dr. Florian Quiroz's office 21 6PM, seeing NP Beltran.  Pt. verbalized agreement and understanding.   Asked the patient to consult with PCP/cardiologist about holding ASA pre procedure if indicated,  Pt. verbalized agreement and understanding.   Patient educated on surgical scrub, COVID testing 21, preadmission instructions, medical and cardiac clearance and day of procedure medications as per policy,  Pt. verbalized agreement and understanding.   Pt. educated with verbal and written instructions per policy. Pt. verbalized agreement and understanding.   Pt instructed to stop vitamins/supplements/herbal medications/NSAIDS for one week prior to surgery and discuss with PMD.  Pt. verbalized agreement and understanding.     OPIOID RISK TOOL    SHOSHANA EACH BOX THAT APPLIES AND ADD TOTALS AT THE END    FAMILY HISTORY OF SUBSTANCE ABUSE                 FEMALE         MALE                                                Alcohol                             [  ]1 pt          [  ]3pts                                               Illegal Durgs                     [  ]2 pts        [  ]3pts                                               Rx Drugs                           [  ]4 pts        [  ]4 pts    PERSONAL HISTORY OF SUBSTANCE ABUSE                                                                                          Alcohol                             [  ]3 pts       [  ]3 pts                                               Illegal Drugs                     [  ]4 pts        [  ]4 pts                                               Rx Drugs                           [  ]5 pts        [  ]5 pts    AGE BETWEEN 16-45 YEARS                                      [  ]1 pt         [  ]1 pt    HISTORY OF PREADOLESCENT   SEXUAL ABUSE                                                             [  ]3 pts        [  ]0pts    PSYCHOLOGICAL DISEASE                     ADD, OCD, Bipolar, Schizophrenia        [  ]2 pts         [  ]2 pts                      Depression                                               [  ]1 pt           [  ]1 pt           SCORING TOTAL   (add numbers and type here)              (0)                                     A score of 3 or lower indicated LOW risk for future opioid abuse  A score of 4 to 7 indicated moderate risk for future opioid abuse  A score of 8 or higher indicates a high risk for opioid abuse    CAPRINI SCORE    AGE RELATED RISK FACTORS                                                             [ ] Age 41-60 years                                            (1 Point)  [x ] Age: 61-74 years                                           (2 Points)                 [ ] Age= 75 years                                                (3 Points)             DISEASE RELATED RISK FACTORS                                                       [ ] Edema in the lower extremities                 (1 Point)                     [ ] Varicose veins                                               (1 Point)                                 [x ] BMI > 25 Kg/m2                                            (1 Point)                                  [ ] Serious infection (ie PNA)                            (1 Point)                     [ ] Lung disease ( COPD, Emphysema)            (1 Point)                                                                          [ ] Acute myocardial infarction                         (1 Point)                  [ ] Congestive heart failure (in the previous month)  (1 Point)         [ ] Inflammatory bowel disease                            (1 Point)                  [ ] Central venous access, PICC or Port               (2 points)       (within the last month)                                                                [ ] Stroke (in the previous month)                        (5 Points)    [ ] Previous or present malignancy                       (2 points)                                                                                                                                                         HEMATOLOGY RELATED FACTORS                                                         [ ] Prior episodes of VTE                                     (3 Points)                     [ ] Positive family history for VTE                      (3 Points)                  [ ] Prothrombin 76557 A                                     (3 Points)                     [ ] Factor V Leiden                                                (3 Points)                        [ ] Lupus anticoagulants                                      (3 Points)                                                           [ ] Anticardiolipin antibodies                              (3 Points)                                                       [ ] High homocysteine in the blood                   (3 Points)                                             [ ] Other congenital or acquired thrombophilia      (3 Points)                                                [ ] Heparin induced thrombocytopenia                  (3 Points)                                        MOBILITY RELATED FACTORS  [ ] Bed rest                                                         (1 Point)  [ ] Plaster cast                                                    (2 points)  [ ] Bed bound for more than 72 hours           (2 Points)    GENDER SPECIFIC FACTORS  [ ] Pregnancy or had a baby within the last month   (1 Point)  [ ] Post-partum < 6 weeks                                   (1 Point)  [ ] Hormonal therapy  or oral contraception   (1 Point)  [ ] History of pregnancy complications              (1 point)  [ ] Unexplained or recurrent              (1 Point)    OTHER RISK FACTORS                                           (1 Point)  [ ] BMI >40, smoking, diabetes requiring insulin, chemotherapy  blood transfusions and length of surgery over 2 hours    SURGERY RELATED RISK FACTORS  [ ]  Section within the last month     (1 Point)  [ ] Minor surgery                                                  (1 Point)  [ ] Arthroscopic surgery                                       (2 Points)  [x ] Planned major surgery lasting more            (2 Points)      than 45 minutes     [ ] Elective hip or knee joint replacement       (5 points)       surgery                                                TRAUMA RELATED RISK FACTORS  [ ] Fracture of the hip, pelvis, or leg                       (5 Points)  [ ] Spinal cord injury resulting in paralysis             (5 points)       (in the previous month)    [ ] Paralysis  (less than 1 month)                             (5 Points)  [ ] Multiple Trauma within 1 month                        (5 Points)    Total Score [     5   ]    Caprini Score 0-2: Low Risk, NO VTE prophylaxis required for most patients, encourage ambulation  Caprini Score 3-6: Moderate Risk , pharmacologic VTE prophylaxis is indicated for most patients (in the absence of contraindications)  Caprini Score Greater than or =7: High risk, pharmocologic VTE prophylaxis indicated for most patients (in the absence of contraindications)

## 2021-08-20 ENCOUNTER — APPOINTMENT (OUTPATIENT)
Dept: CARDIOLOGY | Facility: CLINIC | Age: 71
End: 2021-08-20
Payer: COMMERCIAL

## 2021-08-20 VITALS
HEIGHT: 69 IN | HEART RATE: 76 BPM | OXYGEN SATURATION: 97 % | DIASTOLIC BLOOD PRESSURE: 78 MMHG | SYSTOLIC BLOOD PRESSURE: 135 MMHG | BODY MASS INDEX: 30.81 KG/M2 | WEIGHT: 208 LBS | TEMPERATURE: 98.5 F

## 2021-08-20 PROCEDURE — 93000 ELECTROCARDIOGRAM COMPLETE: CPT

## 2021-08-20 PROCEDURE — 99214 OFFICE O/P EST MOD 30 MIN: CPT

## 2021-08-21 PROBLEM — N40.0 BENIGN PROSTATIC HYPERPLASIA WITHOUT LOWER URINARY TRACT SYMPTOMS: Chronic | Status: ACTIVE | Noted: 2021-08-19

## 2021-08-21 PROBLEM — Z92.29 PERSONAL HISTORY OF OTHER DRUG THERAPY: Chronic | Status: ACTIVE | Noted: 2021-08-19

## 2021-08-21 PROBLEM — N18.30 CHRONIC KIDNEY DISEASE, STAGE 3 UNSPECIFIED: Chronic | Status: ACTIVE | Noted: 2021-08-19

## 2021-08-22 DIAGNOSIS — Z01.818 ENCOUNTER FOR OTHER PREPROCEDURAL EXAMINATION: ICD-10-CM

## 2021-08-23 ENCOUNTER — NON-APPOINTMENT (OUTPATIENT)
Age: 71
End: 2021-08-23

## 2021-08-24 ENCOUNTER — APPOINTMENT (OUTPATIENT)
Dept: DISASTER EMERGENCY | Facility: CLINIC | Age: 71
End: 2021-08-24

## 2021-08-25 LAB — SARS-COV-2 N GENE NPH QL NAA+PROBE: NOT DETECTED

## 2021-08-27 ENCOUNTER — OUTPATIENT (OUTPATIENT)
Dept: OUTPATIENT SERVICES | Facility: HOSPITAL | Age: 71
LOS: 1 days | End: 2021-08-27
Payer: COMMERCIAL

## 2021-08-27 ENCOUNTER — TRANSCRIPTION ENCOUNTER (OUTPATIENT)
Age: 71
End: 2021-08-27

## 2021-08-27 ENCOUNTER — APPOINTMENT (OUTPATIENT)
Dept: COLORECTAL SURGERY | Facility: CLINIC | Age: 71
End: 2021-08-27

## 2021-08-27 ENCOUNTER — APPOINTMENT (OUTPATIENT)
Dept: COLORECTAL SURGERY | Facility: HOSPITAL | Age: 71
End: 2021-08-27
Payer: COMMERCIAL

## 2021-08-27 DIAGNOSIS — Z98.89 OTHER SPECIFIED POSTPROCEDURAL STATES: Chronic | ICD-10-CM

## 2021-08-27 DIAGNOSIS — Z95.5 PRESENCE OF CORONARY ANGIOPLASTY IMPLANT AND GRAFT: Chronic | ICD-10-CM

## 2021-08-27 DIAGNOSIS — Z12.11 ENCOUNTER FOR SCREENING FOR MALIGNANT NEOPLASM OF COLON: ICD-10-CM

## 2021-08-27 DIAGNOSIS — Z87.19 PERSONAL HISTORY OF OTHER DISEASES OF THE DIGESTIVE SYSTEM: Chronic | ICD-10-CM

## 2021-08-27 DIAGNOSIS — Z09 ENCOUNTER FOR FOLLOW-UP EXAMINATION AFTER COMPLETED TREATMENT FOR CONDITIONS OTHER THAN MALIGNANT NEOPLASM: Chronic | ICD-10-CM

## 2021-08-27 DIAGNOSIS — Z98.890 OTHER SPECIFIED POSTPROCEDURAL STATES: Chronic | ICD-10-CM

## 2021-08-27 PROCEDURE — 45378 DIAGNOSTIC COLONOSCOPY: CPT

## 2021-08-27 PROCEDURE — G0121: CPT

## 2021-10-18 ENCOUNTER — APPOINTMENT (OUTPATIENT)
Dept: CARDIOLOGY | Facility: CLINIC | Age: 71
End: 2021-10-18
Payer: COMMERCIAL

## 2021-10-18 ENCOUNTER — MED ADMIN CHARGE (OUTPATIENT)
Age: 71
End: 2021-10-18

## 2021-10-18 PROCEDURE — 93306 TTE W/DOPPLER COMPLETE: CPT

## 2021-10-18 RX ORDER — PERFLUTREN 6.52 MG/ML
6.52 INJECTION, SUSPENSION INTRAVENOUS
Qty: 2 | Refills: 0 | Status: COMPLETED | OUTPATIENT
Start: 2021-10-18

## 2021-10-18 RX ADMIN — PERFLUTREN MG/ML: 6.52 INJECTION, SUSPENSION INTRAVENOUS at 00:00

## 2021-11-01 ENCOUNTER — EMERGENCY (EMERGENCY)
Facility: HOSPITAL | Age: 71
LOS: 1 days | Discharge: HOME | End: 2021-11-01
Attending: EMERGENCY MEDICINE
Payer: COMMERCIAL

## 2021-11-01 VITALS
OXYGEN SATURATION: 97 % | DIASTOLIC BLOOD PRESSURE: 79 MMHG | TEMPERATURE: 98 F | SYSTOLIC BLOOD PRESSURE: 170 MMHG | WEIGHT: 210.1 LBS | HEIGHT: 69 IN | RESPIRATION RATE: 20 BRPM | HEART RATE: 89 BPM

## 2021-11-01 VITALS
RESPIRATION RATE: 20 BRPM | HEART RATE: 72 BPM | SYSTOLIC BLOOD PRESSURE: 132 MMHG | DIASTOLIC BLOOD PRESSURE: 72 MMHG | TEMPERATURE: 98 F | OXYGEN SATURATION: 96 %

## 2021-11-01 DIAGNOSIS — Z09 ENCOUNTER FOR FOLLOW-UP EXAMINATION AFTER COMPLETED TREATMENT FOR CONDITIONS OTHER THAN MALIGNANT NEOPLASM: Chronic | ICD-10-CM

## 2021-11-01 DIAGNOSIS — Z95.5 PRESENCE OF CORONARY ANGIOPLASTY IMPLANT AND GRAFT: Chronic | ICD-10-CM

## 2021-11-01 DIAGNOSIS — Z98.89 OTHER SPECIFIED POSTPROCEDURAL STATES: Chronic | ICD-10-CM

## 2021-11-01 DIAGNOSIS — Z98.890 OTHER SPECIFIED POSTPROCEDURAL STATES: Chronic | ICD-10-CM

## 2021-11-01 DIAGNOSIS — Z87.19 PERSONAL HISTORY OF OTHER DISEASES OF THE DIGESTIVE SYSTEM: Chronic | ICD-10-CM

## 2021-11-01 LAB
ALBUMIN SERPL ELPH-MCNC: 4.5 G/DL — SIGNIFICANT CHANGE UP (ref 3.3–5.2)
ALP SERPL-CCNC: 77 U/L — SIGNIFICANT CHANGE UP (ref 40–120)
ALT FLD-CCNC: 80 U/L — HIGH
ANION GAP SERPL CALC-SCNC: 17 MMOL/L — SIGNIFICANT CHANGE UP (ref 5–17)
AST SERPL-CCNC: 58 U/L — HIGH
BASOPHILS # BLD AUTO: 0 K/UL — SIGNIFICANT CHANGE UP (ref 0–0.2)
BASOPHILS NFR BLD AUTO: 0 % — SIGNIFICANT CHANGE UP (ref 0–2)
BILIRUB SERPL-MCNC: 1.3 MG/DL — SIGNIFICANT CHANGE UP (ref 0.4–2)
BUN SERPL-MCNC: 41.1 MG/DL — HIGH (ref 8–20)
CALCIUM SERPL-MCNC: 9.2 MG/DL — SIGNIFICANT CHANGE UP (ref 8.6–10.2)
CHLORIDE SERPL-SCNC: 96 MMOL/L — LOW (ref 98–107)
CO2 SERPL-SCNC: 28 MMOL/L — SIGNIFICANT CHANGE UP (ref 22–29)
CREAT SERPL-MCNC: 2.21 MG/DL — HIGH (ref 0.5–1.3)
EOSINOPHIL # BLD AUTO: 0 K/UL — SIGNIFICANT CHANGE UP (ref 0–0.5)
EOSINOPHIL NFR BLD AUTO: 0 % — SIGNIFICANT CHANGE UP (ref 0–6)
GIANT PLATELETS BLD QL SMEAR: PRESENT — SIGNIFICANT CHANGE UP
GLUCOSE SERPL-MCNC: 110 MG/DL — HIGH (ref 70–99)
HCT VFR BLD CALC: 50.3 % — HIGH (ref 39–50)
HGB BLD-MCNC: 17 G/DL — SIGNIFICANT CHANGE UP (ref 13–17)
LIDOCAIN IGE QN: 46 U/L — SIGNIFICANT CHANGE UP (ref 22–51)
LYMPHOCYTES # BLD AUTO: 0.94 K/UL — LOW (ref 1–3.3)
LYMPHOCYTES # BLD AUTO: 9.8 % — LOW (ref 13–44)
MANUAL SMEAR VERIFICATION: SIGNIFICANT CHANGE UP
MCHC RBC-ENTMCNC: 29.3 PG — SIGNIFICANT CHANGE UP (ref 27–34)
MCHC RBC-ENTMCNC: 33.8 GM/DL — SIGNIFICANT CHANGE UP (ref 32–36)
MCV RBC AUTO: 86.7 FL — SIGNIFICANT CHANGE UP (ref 80–100)
MONOCYTES # BLD AUTO: 1.28 K/UL — HIGH (ref 0–0.9)
MONOCYTES NFR BLD AUTO: 13.4 % — SIGNIFICANT CHANGE UP (ref 2–14)
NEUTROPHILS # BLD AUTO: 7.34 K/UL — SIGNIFICANT CHANGE UP (ref 1.8–7.4)
NEUTROPHILS NFR BLD AUTO: 76.8 % — SIGNIFICANT CHANGE UP (ref 43–77)
PLAT MORPH BLD: NORMAL — SIGNIFICANT CHANGE UP
PLATELET # BLD AUTO: 256 K/UL — SIGNIFICANT CHANGE UP (ref 150–400)
POTASSIUM SERPL-MCNC: 4.1 MMOL/L — SIGNIFICANT CHANGE UP (ref 3.5–5.3)
POTASSIUM SERPL-SCNC: 4.1 MMOL/L — SIGNIFICANT CHANGE UP (ref 3.5–5.3)
PROT SERPL-MCNC: 8 G/DL — SIGNIFICANT CHANGE UP (ref 6.6–8.7)
RBC # BLD: 5.8 M/UL — SIGNIFICANT CHANGE UP (ref 4.2–5.8)
RBC # FLD: 13.5 % — SIGNIFICANT CHANGE UP (ref 10.3–14.5)
RBC BLD AUTO: NORMAL — SIGNIFICANT CHANGE UP
SODIUM SERPL-SCNC: 141 MMOL/L — SIGNIFICANT CHANGE UP (ref 135–145)
WBC # BLD: 9.56 K/UL — SIGNIFICANT CHANGE UP (ref 3.8–10.5)
WBC # FLD AUTO: 9.56 K/UL — SIGNIFICANT CHANGE UP (ref 3.8–10.5)

## 2021-11-01 PROCEDURE — 93010 ELECTROCARDIOGRAM REPORT: CPT

## 2021-11-01 PROCEDURE — 96374 THER/PROPH/DIAG INJ IV PUSH: CPT

## 2021-11-01 PROCEDURE — 36415 COLL VENOUS BLD VENIPUNCTURE: CPT

## 2021-11-01 PROCEDURE — 99285 EMERGENCY DEPT VISIT HI MDM: CPT

## 2021-11-01 PROCEDURE — 93005 ELECTROCARDIOGRAM TRACING: CPT

## 2021-11-01 PROCEDURE — 83690 ASSAY OF LIPASE: CPT

## 2021-11-01 PROCEDURE — 99284 EMERGENCY DEPT VISIT MOD MDM: CPT | Mod: 25

## 2021-11-01 PROCEDURE — 96375 TX/PRO/DX INJ NEW DRUG ADDON: CPT

## 2021-11-01 PROCEDURE — 80053 COMPREHEN METABOLIC PANEL: CPT

## 2021-11-01 PROCEDURE — 85025 COMPLETE CBC W/AUTO DIFF WBC: CPT

## 2021-11-01 RX ORDER — ONDANSETRON 8 MG/1
1 TABLET, FILM COATED ORAL
Qty: 16 | Refills: 0
Start: 2021-11-01 | End: 2021-11-04

## 2021-11-01 RX ORDER — SODIUM CHLORIDE 9 MG/ML
1000 INJECTION INTRAMUSCULAR; INTRAVENOUS; SUBCUTANEOUS ONCE
Refills: 0 | Status: COMPLETED | OUTPATIENT
Start: 2021-11-01 | End: 2021-11-01

## 2021-11-01 RX ORDER — FAMOTIDINE 10 MG/ML
20 INJECTION INTRAVENOUS ONCE
Refills: 0 | Status: COMPLETED | OUTPATIENT
Start: 2021-11-01 | End: 2021-11-01

## 2021-11-01 RX ORDER — ONDANSETRON 8 MG/1
4 TABLET, FILM COATED ORAL ONCE
Refills: 0 | Status: COMPLETED | OUTPATIENT
Start: 2021-11-01 | End: 2021-11-01

## 2021-11-01 RX ADMIN — Medication 30 MILLILITER(S): at 21:17

## 2021-11-01 RX ADMIN — SODIUM CHLORIDE 1000 MILLILITER(S): 9 INJECTION INTRAMUSCULAR; INTRAVENOUS; SUBCUTANEOUS at 21:29

## 2021-11-01 RX ADMIN — FAMOTIDINE 20 MILLIGRAM(S): 10 INJECTION INTRAVENOUS at 21:17

## 2021-11-01 RX ADMIN — ONDANSETRON 4 MILLIGRAM(S): 8 TABLET, FILM COATED ORAL at 21:17

## 2021-11-01 NOTE — ED STATDOCS - ATTENDING CONTRIBUTION TO CARE
I, Lynn Ngo, performed a face to face bedside interview with this patient regarding history of present illness, review of symptoms and relevant past medical, social and family history.  I completed an independent physical examination. Medical decision making, follow-up on ordered tests (ie labs, radiologic studies) and re-evaluation of the patient's status has been communicated to the ACP.  Disposition of the patient will be based on test outcome and response to ED interventions.

## 2021-11-01 NOTE — ED STATDOCS - NSICDXPASTMEDICALHX_GEN_ALL_CORE_FT
PAST MEDICAL HISTORY:  BPH (benign prostatic hyperplasia)     CAD S/P percutaneous coronary angioplasty stents x 2    COVID-19 vaccine series completed Moderna x 2    Deaf     High cholesterol     [place] (hard of hearing) Understands lip reading and loud, low volume speech. Does not know sign language.    Hyperlipemia     Hypertension     Hypertension     MI (myocardial infarction) may 2010 stents x 2    Paresthesia     Stage 3 chronic kidney disease     Stented coronary artery x 2    TIA (transient ischemic attack)

## 2021-11-01 NOTE — ED STATDOCS - CLINICAL SUMMARY MEDICAL DECISION MAKING FREE TEXT BOX
Patient with epigastric pain [place] airport, wife with similar symptoms, well appearing, NAD. Check EKG, labs, IV hydration and reassess. Lynn Ngo DO

## 2021-11-01 NOTE — ED ADULT TRIAGE NOTE - CHIEF COMPLAINT QUOTE
Patient presents to ER C/O lower abdominal pain, nausea and vomiting, onset of symptoms after eating on Saturday, resp even/unlabored.

## 2021-11-01 NOTE — ED ADULT NURSE NOTE - NSICDXPASTMEDICALHX_GEN_ALL_CORE_FT
PAST MEDICAL HISTORY:  BPH (benign prostatic hyperplasia)     CAD S/P percutaneous coronary angioplasty stents x 2    COVID-19 vaccine series completed Moderna x 2    Deaf     High cholesterol     [place] (hard of hearing) Understands lip reading and loud, low volume speech. Does not know sign language.    Hyperlipemia     Hypertension     Hypertension     MI (myocardial infarction) may 2010 stents x 2    Paresthesia     Stage 3 chronic kidney disease     Stented coronary artery x 2    TIA (transient ischemic attack)

## 2021-11-01 NOTE — ED ADULT NURSE NOTE - PAIN RATING/NUMBER SCALE (0-10): REST
"Mental Health Visit Note    2/15/18   Start time: 11:03 AM    Stop Time: 12:01 PM   Session # 2    Tanna Live is a 31 y.o. female is being seen today for    Chief Complaint   Patient presents with     Follow-up     Anxiety     Depression   .     New symptoms or complaints: None    Functional Impairment:   Personal: 2  Family: 1  Work: 2  Social:1    Clinical assessment of mental status: Tanna presented on time for her appointment. She was oriented x3, open and cooperative, and dressed appropriately for this session and weather. Her memory was Normal cognitive functioning . Her speech was within normal. Language was appropriate to discussion. Concentration and focus is within normal. Psychosis is not noted nor reported. Her mood is euthymic.  Affect is congruent with mood, tearful at times, appropriate to subject. Fund of knowledge is adequate. Insight is adequate for therapy.      Suicidal/Homicidal Ideation present: None Reported This Session    Patient's impression of their current status: Patient stated, \"Loosing family members in the last 2 years recalibrates what you see and feel.  I'm letting go more, and feeling more positive.\"  She considered the effect of time passing and ways participating in a grief group have helped her be able to speak more easily of her past.  She discussed recent interactions with others where she opened up and discovered that it was a source of greater connection with others. She is looking forward to a trip to [place] with her family, while also considering how it will be difficult to be gathered as a family given the absence of her brother.  She reflected on the values that are most meaningful to her, including family, connection with others, health, public service, achievement, creativity and personal growth.    Therapist impression of patients current state: Patient is engaging in the therapeutic process. Her thoughts, feelings, and beliefs were processed. She is willing " to explore strategies for improved symptom management, and was willing to look at the components of wellbeing, particularly fusion/defusion from unhelpful thoughts. She is insightful.    Type of psychotherapeutic technique provided: Insight oriented, Client centered, Solution-focused, CBT and grief support    Progress toward short term goals:Progress as expected, patient is receptive to grief support and strategies for symptom management/improvement as she is in an important life adjustment.    Review of long term goals: Patient is making progress on her long-term goals.     Diagnosis:   1. Adjustment disorder with mixed anxiety and depressed mood        Plan and Follow up: Patient encouraged to continue to be honoring of herself - seek out her support system, show herself expert care (including nurturing a mindfulness breathing practice and engaging in daily physical activity). Patient plans to continue to practice noticing her thoughts to build capacity to recognize thought patterns that are not helpful and awareness of thought-feeling connection while identifying and committing to behaviors that improve her mood. Plans to continue to participate in a grief support group. Plans to return for follow up in in 3 weeks, or as needed.       Discharge Criteria/Planning: Patient will continue with follow-up until therapy can be discontinued without return of signs and symptoms.    Carrie Contreras 2/15/2018       0

## 2021-11-01 NOTE — ED STATDOCS - PHYSICAL EXAMINATION
Gen: NAD, AOx3  Head: NCAT  HEENT: oral mucosa moist, normal conjunctiva, oropharynx clear without exudate or erythema  Lung: CTAB, no respiratory distress, no wheezing, rales, rhonchi  CV: normal s1/s2, rrr, no murmurs, Normal perfusion, pulses 2+ throughout  Abd: soft, mildly tender to palpation epigastric region, no guarding/rigidity  MSK: No edema, no visible deformities, full range of motion in all 4 extremities  Neuro: No focal neurologic deficits  Skin: No rash   Psych: normal affect

## 2021-11-01 NOTE — ED ADULT NURSE NOTE - CAS ELECT INFOMATION PROVIDED
Pt verbalizing understanding of provided discharge instructions. VS documented per flow prior to DC. pt ambulatory to exit with no apparent acute distress observed. Safety maintained in ED./DC instructions

## 2021-11-01 NOTE — ED ADULT NURSE NOTE - OBJECTIVE STATEMENT
Pt received with reports of vomiting since Saturday. Pt with recent travel to [place], stating symptoms started about 2 hours after consuming turkey club sandwich at the airport on the return trip home. Pt presenting with wife at bedside who also complains of GI symptoms after eating at same restaurant. Pt stating 'I feels like everything I drink gets stuck here and then it comes back up', pt pointing to epigastric area. Pt with no active vomiting at this time, medicated per orders. PO trial with water per PA [place]. Pt with no apparent distress observed at this time. Safety maintained.

## 2021-11-01 NOTE — ED STATDOCS - OBJECTIVE STATEMENT
70yo male PMH Coronary Artery Disease, HTN, [place], s/p stents p/w with vomiting. Patient vomited x 8 over last few days. No diarrhea. Patient recently returned from [place], he and his wife ate at [employer] in the airport and are both having GI upset. No fevers/chills. Patient's wife is with him, same symptoms. Spoke to his physician who told him to come to ED. Baseline creatinine 1.5.

## 2021-11-01 NOTE — ED STATDOCS - PATIENT PORTAL LINK FT
You can access the FollowMyHealth Patient Portal offered by [facility] by registering at the following website: [email or web address]. By joining [employer]’s FollowMyHealth portal, you will also be able to view your health information using other applications (apps) compatible with our system.

## 2021-11-01 NOTE — ED STATDOCS - NSFOLLOWUPINSTRUCTIONS_ED_ALL_ED_FT
Diarrhea, Adult      Diarrhea is frequent loose and watery bowel movements. Diarrhea can make you feel weak and cause you to become dehydrated. Dehydration can make you tired and thirsty, cause you to have a dry mouth, and decrease how often you urinate.    Diarrhea typically lasts 2–3 days. However, it can last longer if it is a sign of something more serious. It is important to treat your diarrhea as told by your health care provider.      Follow these instructions at home:      Eating and drinking                 Follow these recommendations as told by your health care provider:  •Take an oral rehydration solution (ORS). This is an over-the-counter medicine that helps return your body to its normal balance of nutrients and water. It is found at pharmacies and retail stores.      •Drink plenty of fluids, such as water, ice chips, diluted fruit juice, and low-calorie sports drinks. You can drink milk also, if desired.      •Avoid drinking fluids that contain a lot of sugar or caffeine, such as energy drinks, sports drinks, and soda.      •Eat bland, easy-to-digest foods in small amounts as you are able. These foods include bananas, applesauce, rice, lean meats, toast, and crackers.      •Avoid alcohol.      •Avoid spicy or fatty foods.      Medicines     •Take over-the-counter and prescription medicines only as told by your health care provider.      •If you were prescribed an antibiotic medicine, take it as told by your health care provider. Do not stop using the antibiotic even if you start to feel better.        General instructions    •Wash your hands often using soap and water. If soap and water are not available, use a hand . Others in the household should wash their hands as well. Hands should be washed:  •After using the toilet or changing a diaper.       •Before preparing, cooking, or serving food.       •While caring for a sick person or while visiting someone in a hospital.         •Drink enough fluid to keep your urine pale yellow.      •Rest at home while you recover.      •Watch your condition for any changes.      •Take a warm bath to relieve any burning or pain from frequent diarrhea episodes.      •Keep all follow-up visits as told by your health care provider. This is important.        Contact a health care provider if:    •You have a fever.      •Your diarrhea gets worse.      •You have new symptoms.      •You cannot keep fluids down.      •You feel light-headed or dizzy.      •You have a headache.      •You have muscle cramps.        Get help right away if:    •You have chest pain.      •You feel extremely weak or you faint.      •You have bloody or black stools or stools that look like tar.      •You have severe pain, cramping, or bloating in your abdomen.      •You have trouble breathing or you are breathing very quickly.      •Your heart is beating very quickly.      •Your skin feels cold and clammy.      •You feel confused.    •You have signs of dehydration, such as:  •Dark urine, very little urine, or no urine.      •Cracked lips.      •Dry mouth.      •Sunken eyes.      •Sleepiness.      •Weakness.          Summary    •Diarrhea is frequent loose and watery bowel movements. Diarrhea can make you feel weak and cause you to become dehydrated.      •Drink enough fluids to keep your urine pale yellow.      •Make sure that you wash your hands after using the toilet. If soap and water are not available, use hand .      •Contact a health care provider if your diarrhea gets worse or you have new symptoms.      •Get help right away if you have signs of dehydration.      This information is not intended to replace advice given to you by your health care provider. Make sure you discuss any questions you have with your health care provider.

## 2021-11-01 NOTE — ED STATDOCS - ADDITIONAL NOTES AND INSTRUCTIONS:
PT was evaluated At [facility] ED and was found to have a condition that warranted time of to rest and heal from WORK/SCHOOL.   David Hanna PA-C

## 2021-11-01 NOTE — ED STATDOCS - NSICDXPASTSURGICALHX_GEN_ALL_CORE_FT
PAST SURGICAL HISTORY:  History of ankle surgery left ankle pin    History of hemorrhoids procedure for hemorrhoids in past    S/P appendectomy     S/P hernia repair bilateral hernia of groin    S/P umbilical hernia repair, follow-up exam     Stented coronary artery x 2

## 2021-11-01 NOTE — ED STATDOCS - PROGRESS NOTE DETAILS
David Quintana: Pt seen by intake physician and HPI/ROS/PE/plan reviewed Confirmed and adjusted were appropriate.    PE: GEN: Awake, alert,  NAD,  EYES: PERRL CARDIAC: Reg rate and rhythm, S1,S2, RRR  RESP: No distress noted. Lungs CTA bilaterally no wheeze, ronchi, rales. ABD: soft,  non-tender, no guarding. . NEURO: AOx3, no focal deficits   PLAN: PT with stable VS, no acute distress, non toxic appearing, tolerating PO in the ED, Pt with sig clinical improvement with conservative Tx, Pt with elevated kidney function pt informed pt to follow up to PCP, Pt to be dc home with supportive care for all other symptoms good  hydration, light diet, educated about when to return to the ED if needed. PT verbalizes that he understands all instructions and results. Pt informed that ED is open and available 24/7 365 days a yr, encouraged to return to the ED if they have any change in condition, or feel the need for revaluation.

## 2021-11-03 ENCOUNTER — APPOINTMENT (OUTPATIENT)
Dept: CARDIOLOGY | Facility: CLINIC | Age: 71
End: 2021-11-03

## 2021-12-01 ENCOUNTER — APPOINTMENT (OUTPATIENT)
Dept: CARDIOLOGY | Facility: CLINIC | Age: 71
End: 2021-12-01
Payer: COMMERCIAL

## 2021-12-01 ENCOUNTER — NON-APPOINTMENT (OUTPATIENT)
Age: 71
End: 2021-12-01

## 2021-12-01 VITALS
WEIGHT: 205 LBS | BODY MASS INDEX: 30.36 KG/M2 | DIASTOLIC BLOOD PRESSURE: 70 MMHG | HEIGHT: 69 IN | OXYGEN SATURATION: 96 % | TEMPERATURE: 98.1 F | HEART RATE: 86 BPM | SYSTOLIC BLOOD PRESSURE: 150 MMHG | RESPIRATION RATE: 16 BRPM

## 2021-12-01 VITALS — SYSTOLIC BLOOD PRESSURE: 138 MMHG | DIASTOLIC BLOOD PRESSURE: 58 MMHG

## 2021-12-01 DIAGNOSIS — I77.810 THORACIC AORTIC ECTASIA: ICD-10-CM

## 2021-12-01 DIAGNOSIS — Z01.810 ENCOUNTER FOR PREPROCEDURAL CARDIOVASCULAR EXAMINATION: ICD-10-CM

## 2021-12-01 PROCEDURE — 99215 OFFICE O/P EST HI 40 MIN: CPT

## 2021-12-01 PROCEDURE — 93000 ELECTROCARDIOGRAM COMPLETE: CPT

## 2021-12-01 RX ORDER — SIMVASTATIN 40 MG/1
40 TABLET, FILM COATED ORAL
Refills: 0 | Status: DISCONTINUED | COMMUNITY
Start: 2019-02-03 | End: 2021-12-01

## 2022-01-31 NOTE — HISTORY OF PRESENT ILLNESS
[Preoperative Visit] : for a medical evaluation prior to surgery [Scheduled Procedure ___] : a [unfilled] [Date of Surgery ___] : on [unfilled] [Surgeon Name ___] : surgeon: [unfilled] [Urinary Frequency] : urinary frequency [Cardiovascular Disease] : cardiovascular disease [Anti-Platelet Agents] : anti-platelet agents [Alcohol Use] : alcohol use [Renal Disease] : renal disease [Frequent Aspirin Use] : frequent aspirin use [Electrocardiogram] : ~T an ECG ~C was performed [Echocardiogram] : ~T an echocardiogram ~C was performed [Metabolic Capacity ___Mets%] : The patient has a metabolic capacity of [unfilled] Mets%  [Good] : Good [Fever] : no fever [Chills] : no chills [Fatigue] : no fatigue [Chest Pain] : no chest pain [Cough] : no cough [Dyspnea] : no dyspnea [Dysuria] : no dysuria [Nausea] : no nausea [Vomiting] : no vomiting [Diarrhea] : no diarrhea [Abdominal Pain] : no abdominal pain [Easy Bruising] : no easy bruising [Lower Extremity Swelling] : no lower extremity swelling [Poor Exercise Tolerance] : no poor exercise tolerance [Diabetes] : no diabetes [Pulmonary Disease] : no pulmonary disease [Nicotine Dependence] : no nicotine dependence [GI Disease] : no gastrointestinal disease [Sleep Apnea] : no sleep apnea [Thromboembolic Problems] : no thromboembolic problems [Frequent use of NSAIDs] : no use of NSAIDs [Transfusion Reaction] : no transfusion reaction [Impaired Immunity] : no impaired immunity [Steroid Use in Last 6 Months] : no steroid use in the last six months [Prior Anesthesia] : No prior anesthesia [Prev Anesthesia Reaction] : no previous anesthesia reaction [Anesthesia Reaction] : no anesthesia reaction [Sudden Death] : no sudden death [Clotting Disorder] : no clotting disorder [Bleeding Disorder] : no bleeding disorder [FreeTextEntry1] : 70 y/o male with PMH of CAD s/p prior AWMI s/p primary PCI with ADRIAN to LAD in 2010 (Promus 3.5 x 23 mm and 3.5 x 8 mm), HTN, 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 Reports feeling well today. Denies chest pain, SOB at rest, RUBIO, palpitations, lightheadedness, dizziness, fatigue, syncope, near syncope and LE edema. He had labs done with presurgical testing. He is able to walk up 2 flights of stairs without chest pain or SOB. Denies smoking. Admits to rare alcohol intake ( approximately 1 glass weekly). \par \par \par

## 2022-01-31 NOTE — REVIEW OF SYSTEMS
[Urinary Frequency] : urinary frequency [Fever] : no fever [Headache] : no headache [Chills] : no chills [Feeling Fatigued] : not feeling fatigued [SOB] : no shortness of breath [Dyspnea on exertion] : not dyspnea during exertion [Chest Discomfort] : no chest discomfort [Lower Ext Edema] : no extremity edema [Leg Claudication] : no intermittent leg claudication [Palpitations] : no palpitations [Orthopnea] : no orthopnea [PND] : no PND [Syncope] : no syncope [Cough] : no cough [Abdominal Pain] : no abdominal pain [Nausea] : no nausea [Vomiting] : no vomiting [Heartburn] : no heartburn [Dizziness] : no dizziness [Numbness (Hypoesthesia)] : no numbness [Tingling (Paresthesia)] : no tingling [Limb Weakness (Paresis)] : no limb weakness (Paresis) [Confusion] : no confusion was observed [Memory Lapses Or Loss] : no memory lapses or loss [Easy Bleeding] : no tendency for easy bleeding [Easy Bruising] : no tendency for easy bruising

## 2022-01-31 NOTE — CARDIOLOGY SUMMARY
[___] : [unfilled] [de-identified] : 8/20/2021: Sinus  Rhythm \par - RBBB with left axis -bifascicular block. \par - Old anteroseptal infarct.  [de-identified] : 08/2018 NST= no ischemic ECG changes, large fixed defect involving the apical wall of moderate to severe intensity consistent with antecedent infarction; moderate hypokinesis of the apical wall, EF 54%.  [de-identified] : TTE 10/18/2021: LVEF by MOD is 49%, Grade I DD, Mild to Moderate AR, Sclerotic aortic valve with normal opening, estimated PASP is 44.0 mmHg (Mild Pulmonary HTN), basal anteroseptal segment, apical septal segment and apical inferior segment are akinetic, apex is hypokinetic; no significant changes compared to TTE 07/2020.

## 2022-01-31 NOTE — PHYSICAL EXAM
[General Appearance - Well Developed] : well developed [General Appearance - In No Acute Distress] : no acute distress [] : no respiratory distress [Respiration, Rhythm And Depth] : normal respiratory rhythm and effort [Exaggerated Use Of Accessory Muscles For Inspiration] : no accessory muscle use [Auscultation Breath Sounds / Voice Sounds] : lungs were clear to auscultation bilaterally [Heart Rate And Rhythm] : heart rate and rhythm were normal [Heart Sounds] : normal S1 and S2 [Murmurs] : no murmurs present [Arterial Pulses Normal] : the arterial pulses were normal [Edema] : no peripheral edema present [Bowel Sounds] : normal bowel sounds [Abnormal Walk] : normal gait [Nail Clubbing] : no clubbing of the fingernails [Cyanosis, Localized] : no localized cyanosis [Skin Color & Pigmentation] : normal skin color and pigmentation [Oriented To Time, Place, And Person] : oriented to person, place, and time [Impaired Insight] : insight and judgment were intact [Memory Recent] : recent memory was not impaired [FreeTextEntry1] : No carotid bruit noted

## 2022-01-31 NOTE — END OF VISIT
[Time Spent: ___ minutes] : I have spent [unfilled] minutes of time on the encounter. [FreeTextEntry3] : Discussed with NP. Agree with history, physical , assessment and plan\par \par

## 2022-01-31 NOTE — DISCUSSION/SUMMARY
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 Reports feeling well today. Denies chest pain, SOB at rest, RUBIO, palpitations, lightheadedness, dizziness, fatigue, syncope, near syncope and LE edema. He had labs done with presurgical testing. He is able to walk up 2 flights of stairs without chest pain or SOB. Denies smoking. Admits to rare alcohol intake (approximately 1 glass weekly). Discussed with and patient seen by Dr. Quiroz. \par \par Plan: Low risk procedure. Patient is stable to proceed with upcoming lipoma removal surgery on 12/13/2021. He may hold aspirin for one week prior to procedure and resume asap when deemed safe by surgical team. Follow up with Dr. Quiroz in February 2022 as scheduled or sooner if needed. \par \par Patient was advised to contact the office or seek emergency medical care for any new, worsening or concerning symptoms. Patient verbalized understanding and is in agreement with the above plan.\par \par Nikki JOHNSON

## 2022-02-23 ENCOUNTER — APPOINTMENT (OUTPATIENT)
Dept: CARDIOLOGY | Facility: CLINIC | Age: 72
End: 2022-02-23

## 2022-04-06 ENCOUNTER — APPOINTMENT (OUTPATIENT)
Dept: CARDIOLOGY | Facility: CLINIC | Age: 72
End: 2022-04-06

## 2022-06-01 ENCOUNTER — APPOINTMENT (OUTPATIENT)
Dept: CARDIOLOGY | Facility: CLINIC | Age: 72
End: 2022-06-01
Payer: COMMERCIAL

## 2022-06-01 ENCOUNTER — NON-APPOINTMENT (OUTPATIENT)
Age: 72
End: 2022-06-01

## 2022-06-01 VITALS
TEMPERATURE: 98.2 F | HEIGHT: 69 IN | DIASTOLIC BLOOD PRESSURE: 72 MMHG | BODY MASS INDEX: 31.25 KG/M2 | HEART RATE: 78 BPM | OXYGEN SATURATION: 96 % | SYSTOLIC BLOOD PRESSURE: 144 MMHG | WEIGHT: 211 LBS

## 2022-06-01 PROCEDURE — 93000 ELECTROCARDIOGRAM COMPLETE: CPT | Mod: 59

## 2022-06-01 PROCEDURE — 93246 EXT ECG>7D<15D RECORDING: CPT

## 2022-06-01 PROCEDURE — 99215 OFFICE O/P EST HI 40 MIN: CPT

## 2022-06-01 RX ORDER — ATORVASTATIN CALCIUM 20 MG/1
20 TABLET, FILM COATED ORAL
Qty: 1 | Refills: 0 | Status: ACTIVE | COMMUNITY
Start: 2021-12-01

## 2022-06-01 RX ORDER — LOSARTAN POTASSIUM 100 MG/1
100 TABLET, FILM COATED ORAL DAILY
Qty: 90 | Refills: 3 | Status: ACTIVE | COMMUNITY
Start: 2019-01-16

## 2022-06-01 RX ORDER — HYDROCORTISONE ACETATE 25 MG/1
25 SUPPOSITORY RECTAL
Qty: 10 | Refills: 3 | Status: DISCONTINUED | COMMUNITY
Start: 2021-03-17 | End: 2022-06-01

## 2022-06-01 NOTE — DISCUSSION/SUMMARY
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 \par echo showed preserved EF in 2021 with mild to moderate AI and mild PH \par Stress test showed apical scar with no ischemia in 2018 and patient doesn't have anginal chest pain or angina equivalent symptoms. \par

## 2022-06-01 NOTE — CARDIOLOGY SUMMARY
[___] : [unfilled] [de-identified] : 8/20/2021: Sinus  Rhythm \par - RBBB with left axis -bifascicular block. \par - Old anteroseptal infarct.  [de-identified] : 08/2018 NST= no ischemic ECG changes, large fixed defect involving the apical wall of moderate to severe intensity consistent with antecedent infarction; moderate hypokinesis of the apical wall, EF 54%.  [de-identified] : TTE 10/18/2021: LVEF by MOD is 49%, Grade I DD, Mild to Moderate AR, Sclerotic aortic valve with normal opening, estimated PASP is 44.0 mmHg (Mild Pulmonary HTN), basal anteroseptal segment, apical septal segment and apical inferior segment are akinetic, apex is hypokinetic; no significant changes compared to TTE 07/2020.

## 2022-06-01 NOTE — ASSESSMENT
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 \par echo showed preserved EF in 2021 with mild to moderate AI and mild PH \par Stress test showed apical scar with no ischemia in 2018 and patient doesn't have anginal chest pain or angina equivalent symptoms. \par \par Erectile dysfunction, can take sildenafil. since patient is not on nitrates. \par

## 2022-06-01 NOTE — REVIEW OF SYSTEMS
[Urinary Frequency] : urinary frequency [Fever] : no fever [Headache] : no headache [Chills] : no chills [Feeling Fatigued] : not feeling fatigued [SOB] : no shortness of breath [Dyspnea on exertion] : not dyspnea during exertion [Chest Discomfort] : no chest discomfort [Lower Ext Edema] : no extremity edema [Leg Claudication] : no intermittent leg claudication [Palpitations] : palpitations [Orthopnea] : no orthopnea [PND] : no PND [Syncope] : no syncope [Cough] : no cough [Abdominal Pain] : no abdominal pain [Nausea] : no nausea [Vomiting] : no vomiting [Heartburn] : no heartburn [Dizziness] : no dizziness [Numbness (Hypoesthesia)] : no numbness [Tingling (Paresthesia)] : no tingling [Limb Weakness (Paresis)] : no limb weakness (Paresis) [Confusion] : no confusion was observed [Memory Lapses Or Loss] : no memory lapses or loss [Easy Bleeding] : no tendency for easy bleeding [Easy Bruising] : no tendency for easy bruising

## 2022-06-01 NOTE — HISTORY OF PRESENT ILLNESS
Post-Care Instructions: I reviewed with the patient in detail post-care instructions. Patient is to keep the ears clean and dry for 6 weeks. Earrings should be kept in for 6 weeks continuously (12 weeks for cartilage piercing) before removing.  Also recommended cleaning the piercing site daily with alcohol and a cotton-tipped applicator. Should the patient develop any piercing site reactions or pain patient will contact the office immediately. [Preoperative Visit] : for a medical evaluation prior to surgery [Scheduled Procedure ___] : a [unfilled] [Date of Surgery ___] : on [unfilled] [Surgeon Name ___] : surgeon: [unfilled] Detail Level: Detailed [Urinary Frequency] : urinary frequency [Cardiovascular Disease] : cardiovascular disease Wound Care: Petrolatum [Anti-Platelet Agents] : anti-platelet agents Consent was obtained from the patient. The risks of the procedure were discussed in detail. Specifically, the risks of infection, scarring, bleeding, prolonged wound healing, pain and allergic reaction were addressed. Prior to the procedure, the piercing site was clearly identified and confirmed by the patient. [Alcohol Use] : alcohol use [Renal Disease] : renal disease [Frequent Aspirin Use] : frequent aspirin use [Electrocardiogram] : ~T an ECG ~C was performed [Echocardiogram] : ~T an echocardiogram ~C was performed [Metabolic Capacity ___Mets%] : The patient has a metabolic capacity of [unfilled] Mets%  [Good] : Good [Fever] : no fever [Chills] : no chills [Fatigue] : no fatigue [Chest Pain] : no chest pain [Cough] : no cough [Dyspnea] : no dyspnea [Dysuria] : no dysuria [Nausea] : no nausea [Vomiting] : no vomiting [Diarrhea] : no diarrhea [Abdominal Pain] : no abdominal pain [Easy Bruising] : no easy bruising [Lower Extremity Swelling] : no lower extremity swelling [Poor Exercise Tolerance] : no poor exercise tolerance [Diabetes] : no diabetes [Pulmonary Disease] : no pulmonary disease [Nicotine Dependence] : no nicotine dependence [GI Disease] : no gastrointestinal disease [Sleep Apnea] : no sleep apnea [Thromboembolic Problems] : no thromboembolic problems [Frequent use of NSAIDs] : no use of NSAIDs [Transfusion Reaction] : no transfusion reaction [Impaired Immunity] : no impaired immunity [Steroid Use in Last 6 Months] : no steroid use in the last six months [Prior Anesthesia] : No prior anesthesia [Prev Anesthesia Reaction] : no previous anesthesia reaction [Anesthesia Reaction] : no anesthesia reaction [Sudden Death] : no sudden death [Clotting Disorder] : no clotting disorder [Bleeding Disorder] : no bleeding disorder [FreeTextEntry1] : 70 y/o male with PMH of CAD s/p prior AWMI s/p primary PCI with ADRIAN to LAD in 2010 (Promus 3.5 x 23 mm and 3.5 x 8 mm), HTN, 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 Reports feeling well today. Denies chest pain, SOB at rest, RUBIO, palpitations, lightheadedness, dizziness, fatigue, syncope, near syncope and LE edema. He had labs done with presurgical testing. He is able to walk up 2 flights of stairs without chest pain or SOB. Denies smoking. Admits to rare alcohol intake ( approximately 1 glass weekly). \par \par 6/1/2022\par Returns for follow up. \par c/o of palpitations when he is rushed to get to the bathroom after climbing the stairs . \par no chest pain or SOB , no LE edema \par no dizziness with palpitations. \par \par

## 2022-06-22 ENCOUNTER — NON-APPOINTMENT (OUTPATIENT)
Age: 72
End: 2022-06-22

## 2022-06-27 ENCOUNTER — RESULT CHARGE (OUTPATIENT)
Age: 72
End: 2022-06-27

## 2022-06-27 ENCOUNTER — NON-APPOINTMENT (OUTPATIENT)
Age: 72
End: 2022-06-27

## 2022-06-28 ENCOUNTER — APPOINTMENT (OUTPATIENT)
Dept: CARDIOLOGY | Facility: CLINIC | Age: 72
End: 2022-06-28

## 2022-06-28 ENCOUNTER — NON-APPOINTMENT (OUTPATIENT)
Age: 72
End: 2022-06-28

## 2022-06-28 VITALS
BODY MASS INDEX: 31.1 KG/M2 | HEART RATE: 81 BPM | WEIGHT: 210 LBS | HEIGHT: 69 IN | SYSTOLIC BLOOD PRESSURE: 160 MMHG | TEMPERATURE: 98.2 F | DIASTOLIC BLOOD PRESSURE: 78 MMHG | OXYGEN SATURATION: 96 %

## 2022-06-28 PROCEDURE — 99214 OFFICE O/P EST MOD 30 MIN: CPT

## 2022-06-28 PROCEDURE — 93000 ELECTROCARDIOGRAM COMPLETE: CPT

## 2022-06-28 RX ORDER — METOPROLOL SUCCINATE 50 MG/1
50 TABLET, EXTENDED RELEASE ORAL
Qty: 30 | Refills: 2 | Status: ACTIVE | COMMUNITY
Start: 2019-01-16

## 2022-06-29 ENCOUNTER — INPATIENT (INPATIENT)
Facility: HOSPITAL | Age: 72
LOS: 1 days | Discharge: HOME | DRG: 287 | End: 2022-07-01
Attending: STUDENT IN AN ORGANIZED HEALTH CARE EDUCATION/TRAINING PROGRAM | Admitting: INTERNAL MEDICINE
Payer: COMMERCIAL

## 2022-06-29 VITALS
OXYGEN SATURATION: 94 % | RESPIRATION RATE: 18 BRPM | HEART RATE: 75 BPM | WEIGHT: 210.1 LBS | TEMPERATURE: 99 F | DIASTOLIC BLOOD PRESSURE: 95 MMHG | SYSTOLIC BLOOD PRESSURE: 193 MMHG | HEIGHT: 69 IN

## 2022-06-29 DIAGNOSIS — Z98.890 OTHER SPECIFIED POSTPROCEDURAL STATES: Chronic | ICD-10-CM

## 2022-06-29 DIAGNOSIS — I65.22 OCCLUSION AND STENOSIS OF LEFT CAROTID ARTERY: ICD-10-CM

## 2022-06-29 DIAGNOSIS — I10 ESSENTIAL (PRIMARY) HYPERTENSION: ICD-10-CM

## 2022-06-29 DIAGNOSIS — I25.110 ATHEROSCLEROTIC HEART DISEASE OF NATIVE CORONARY ARTERY WITH UNSTABLE ANGINA PECTORIS: ICD-10-CM

## 2022-06-29 DIAGNOSIS — Z98.89 OTHER SPECIFIED POSTPROCEDURAL STATES: Chronic | ICD-10-CM

## 2022-06-29 DIAGNOSIS — Z95.5 PRESENCE OF CORONARY ANGIOPLASTY IMPLANT AND GRAFT: Chronic | ICD-10-CM

## 2022-06-29 DIAGNOSIS — I47.2 VENTRICULAR TACHYCARDIA: ICD-10-CM

## 2022-06-29 DIAGNOSIS — E78.2 MIXED HYPERLIPIDEMIA: ICD-10-CM

## 2022-06-29 DIAGNOSIS — R55 SYNCOPE AND COLLAPSE: ICD-10-CM

## 2022-06-29 DIAGNOSIS — Z87.19 PERSONAL HISTORY OF OTHER DISEASES OF THE DIGESTIVE SYSTEM: Chronic | ICD-10-CM

## 2022-06-29 DIAGNOSIS — Z90.89 ACQUIRED ABSENCE OF OTHER ORGANS: Chronic | ICD-10-CM

## 2022-06-29 DIAGNOSIS — Z09 ENCOUNTER FOR FOLLOW-UP EXAMINATION AFTER COMPLETED TREATMENT FOR CONDITIONS OTHER THAN MALIGNANT NEOPLASM: Chronic | ICD-10-CM

## 2022-06-29 DIAGNOSIS — R42 DIZZINESS AND GIDDINESS: ICD-10-CM

## 2022-06-29 LAB
ALBUMIN SERPL ELPH-MCNC: 4 G/DL — SIGNIFICANT CHANGE UP (ref 3.3–5.2)
ALP SERPL-CCNC: 86 U/L — SIGNIFICANT CHANGE UP (ref 40–120)
ALT FLD-CCNC: 20 U/L — SIGNIFICANT CHANGE UP
ANION GAP SERPL CALC-SCNC: 12 MMOL/L — SIGNIFICANT CHANGE UP (ref 5–17)
AST SERPL-CCNC: 22 U/L — SIGNIFICANT CHANGE UP
BASOPHILS # BLD AUTO: 0.09 K/UL — SIGNIFICANT CHANGE UP (ref 0–0.2)
BASOPHILS NFR BLD AUTO: 1 % — SIGNIFICANT CHANGE UP (ref 0–2)
BILIRUB SERPL-MCNC: 0.6 MG/DL — SIGNIFICANT CHANGE UP (ref 0.4–2)
BUN SERPL-MCNC: 19.9 MG/DL — SIGNIFICANT CHANGE UP (ref 8–20)
CALCIUM SERPL-MCNC: 9.2 MG/DL — SIGNIFICANT CHANGE UP (ref 8.6–10.2)
CHLORIDE SERPL-SCNC: 103 MMOL/L — SIGNIFICANT CHANGE UP (ref 98–107)
CO2 SERPL-SCNC: 23 MMOL/L — SIGNIFICANT CHANGE UP (ref 22–29)
CREAT SERPL-MCNC: 1.58 MG/DL — HIGH (ref 0.5–1.3)
EGFR: 46 ML/MIN/1.73M2 — LOW
EOSINOPHIL # BLD AUTO: 0.32 K/UL — SIGNIFICANT CHANGE UP (ref 0–0.5)
EOSINOPHIL NFR BLD AUTO: 3.6 % — SIGNIFICANT CHANGE UP (ref 0–6)
GLUCOSE SERPL-MCNC: 98 MG/DL — SIGNIFICANT CHANGE UP (ref 70–99)
HCT VFR BLD CALC: 42.1 % — SIGNIFICANT CHANGE UP (ref 39–50)
HGB BLD-MCNC: 14.7 G/DL — SIGNIFICANT CHANGE UP (ref 13–17)
IMM GRANULOCYTES NFR BLD AUTO: 1.2 % — SIGNIFICANT CHANGE UP (ref 0–1.5)
LYMPHOCYTES # BLD AUTO: 1.32 K/UL — SIGNIFICANT CHANGE UP (ref 1–3.3)
LYMPHOCYTES # BLD AUTO: 14.9 % — SIGNIFICANT CHANGE UP (ref 13–44)
MAGNESIUM SERPL-MCNC: 2.1 MG/DL — SIGNIFICANT CHANGE UP (ref 1.6–2.6)
MCHC RBC-ENTMCNC: 29.6 PG — SIGNIFICANT CHANGE UP (ref 27–34)
MCHC RBC-ENTMCNC: 34.9 GM/DL — SIGNIFICANT CHANGE UP (ref 32–36)
MCV RBC AUTO: 84.7 FL — SIGNIFICANT CHANGE UP (ref 80–100)
MONOCYTES # BLD AUTO: 0.99 K/UL — HIGH (ref 0–0.9)
MONOCYTES NFR BLD AUTO: 11.2 % — SIGNIFICANT CHANGE UP (ref 2–14)
NEUTROPHILS # BLD AUTO: 6.01 K/UL — SIGNIFICANT CHANGE UP (ref 1.8–7.4)
NEUTROPHILS NFR BLD AUTO: 68.1 % — SIGNIFICANT CHANGE UP (ref 43–77)
NT-PROBNP SERPL-SCNC: 196 PG/ML — SIGNIFICANT CHANGE UP (ref 0–300)
PLATELET # BLD AUTO: 201 K/UL — SIGNIFICANT CHANGE UP (ref 150–400)
POTASSIUM SERPL-MCNC: 4 MMOL/L — SIGNIFICANT CHANGE UP (ref 3.5–5.3)
POTASSIUM SERPL-SCNC: 4 MMOL/L — SIGNIFICANT CHANGE UP (ref 3.5–5.3)
PROT SERPL-MCNC: 6.7 G/DL — SIGNIFICANT CHANGE UP (ref 6.6–8.7)
RBC # BLD: 4.97 M/UL — SIGNIFICANT CHANGE UP (ref 4.2–5.8)
RBC # FLD: 13 % — SIGNIFICANT CHANGE UP (ref 10.3–14.5)
SARS-COV-2 RNA SPEC QL NAA+PROBE: SIGNIFICANT CHANGE UP
SODIUM SERPL-SCNC: 138 MMOL/L — SIGNIFICANT CHANGE UP (ref 135–145)
TROPONIN T SERPL-MCNC: <0.01 NG/ML — SIGNIFICANT CHANGE UP (ref 0–0.06)
TROPONIN T SERPL-MCNC: <0.01 NG/ML — SIGNIFICANT CHANGE UP (ref 0–0.06)
WBC # BLD: 8.84 K/UL — SIGNIFICANT CHANGE UP (ref 3.8–10.5)
WBC # FLD AUTO: 8.84 K/UL — SIGNIFICANT CHANGE UP (ref 3.8–10.5)

## 2022-06-29 PROCEDURE — 99222 1ST HOSP IP/OBS MODERATE 55: CPT

## 2022-06-29 PROCEDURE — 99253 IP/OBS CNSLTJ NEW/EST LOW 45: CPT

## 2022-06-29 PROCEDURE — 99285 EMERGENCY DEPT VISIT HI MDM: CPT

## 2022-06-29 PROCEDURE — 71045 X-RAY EXAM CHEST 1 VIEW: CPT | Mod: 26

## 2022-06-29 PROCEDURE — 93306 TTE W/DOPPLER COMPLETE: CPT | Mod: 26

## 2022-06-29 PROCEDURE — 93010 ELECTROCARDIOGRAM REPORT: CPT

## 2022-06-29 PROCEDURE — 70450 CT HEAD/BRAIN W/O DYE: CPT | Mod: 26,MA

## 2022-06-29 PROCEDURE — 99223 1ST HOSP IP/OBS HIGH 75: CPT

## 2022-06-29 RX ORDER — TAMSULOSIN HYDROCHLORIDE 0.4 MG/1
1 CAPSULE ORAL
Qty: 0 | Refills: 0 | DISCHARGE

## 2022-06-29 RX ORDER — TAMSULOSIN HYDROCHLORIDE 0.4 MG/1
0.4 CAPSULE ORAL DAILY
Refills: 0 | Status: DISCONTINUED | OUTPATIENT
Start: 2022-06-29 | End: 2022-07-01

## 2022-06-29 RX ORDER — AMLODIPINE BESYLATE 2.5 MG/1
1 TABLET ORAL
Qty: 0 | Refills: 0 | DISCHARGE

## 2022-06-29 RX ORDER — ASPIRIN/CALCIUM CARB/MAGNESIUM 324 MG
81 TABLET ORAL DAILY
Refills: 0 | Status: DISCONTINUED | OUTPATIENT
Start: 2022-06-29 | End: 2022-07-01

## 2022-06-29 RX ORDER — METOPROLOL TARTRATE 50 MG
50 TABLET ORAL DAILY
Refills: 0 | Status: DISCONTINUED | OUTPATIENT
Start: 2022-06-29 | End: 2022-07-01

## 2022-06-29 RX ORDER — METOPROLOL TARTRATE 50 MG
1 TABLET ORAL
Qty: 0 | Refills: 0 | DISCHARGE

## 2022-06-29 RX ORDER — SODIUM CHLORIDE 9 MG/ML
1000 INJECTION INTRAMUSCULAR; INTRAVENOUS; SUBCUTANEOUS
Refills: 0 | Status: DISCONTINUED | OUTPATIENT
Start: 2022-06-29 | End: 2022-07-01

## 2022-06-29 RX ORDER — LOSARTAN POTASSIUM 100 MG/1
100 TABLET, FILM COATED ORAL DAILY
Refills: 0 | Status: DISCONTINUED | OUTPATIENT
Start: 2022-06-29 | End: 2022-07-01

## 2022-06-29 RX ORDER — ATORVASTATIN CALCIUM 80 MG/1
1 TABLET, FILM COATED ORAL
Qty: 0 | Refills: 0 | DISCHARGE

## 2022-06-29 RX ORDER — ATORVASTATIN CALCIUM 80 MG/1
10 TABLET, FILM COATED ORAL AT BEDTIME
Refills: 0 | Status: DISCONTINUED | OUTPATIENT
Start: 2022-06-29 | End: 2022-07-01

## 2022-06-29 RX ORDER — AMLODIPINE BESYLATE 2.5 MG/1
5 TABLET ORAL DAILY
Refills: 0 | Status: DISCONTINUED | OUTPATIENT
Start: 2022-06-29 | End: 2022-07-01

## 2022-06-29 RX ADMIN — ATORVASTATIN CALCIUM 10 MILLIGRAM(S): 80 TABLET, FILM COATED ORAL at 21:19

## 2022-06-29 RX ADMIN — SODIUM CHLORIDE 50 MILLILITER(S): 9 INJECTION INTRAMUSCULAR; INTRAVENOUS; SUBCUTANEOUS at 23:34

## 2022-06-29 RX ADMIN — Medication 50 MILLIGRAM(S): at 16:56

## 2022-06-29 NOTE — H&P ADULT - NSHPREVIEWOFSYSTEMS_GEN_ALL_CORE
REVIEW OF SYSTEMS  General: denies weakness, malaise  Skin/Breast: no new rash  Ophthalmologic: no change in vision  ENMT: no dysphagia, throat pain or change in hearing  Respiratory and Thorax: no difficulty breathing or chest pain  Cardiovascular: no palpitations or PND, orthopnea  Gastrointestinal: no abdominal pain, normal bowel movement, no dark stools  Genitourinary: no difficulty urinating, no burning urination  Musculoskeletal: no myalgia/arthrlagia  Neurological: no weakness, numbness, change in gait  Psychiatric: no depression, anxiety  Hematology/Lymphatics: denies easy bruising or bleeding  Endocrine: no polyuria, polydipsia REVIEW OF SYSTEMS  General: denies weakness, malaise  Skin/Breast: no new rash  Ophthalmologic: no change in vision  ENMT: no dysphagia, throat pain or change in hearing  Respiratory and Thorax: no difficulty breathing or chest pain  Cardiovascular: +palpitations, +jaw pain, +RUBIO  Gastrointestinal: no abdominal pain, normal bowel movement, no dark stools  Genitourinary: no difficulty urinating, no burning urination  Musculoskeletal: no myalgia/arthrlagia  Neurological: no weakness, numbness, change in gait  Psychiatric: no depression, anxiety  Hematology/Lymphatics: denies easy bruising or bleeding  Endocrine: no polyuria, polydipsia

## 2022-06-29 NOTE — ED PROVIDER NOTE - PROGRESS NOTE DETAILS
labs reviewed. troponin negative. spoke with cardiology for recommendation for admission and further work up. patient noted to have short run of vtach on monitor that resolved. asymptomatic. patient placed pads. spoke with hospitalist for admission. remains hemodynamically stable on reassessment. -DO Eri

## 2022-06-29 NOTE — ED ADULT NURSE NOTE - OBJECTIVE STATEMENT
Pt received A&Ox4 c/o dizziness that began today after having a "medicinal massage". Pt states he was receiving massage to R arm for "trigger finger" and felt a "flush sensation" associated with palpitations. Wife states pt has hx of heart arrythmia. Pt placed on CM in NSR w/. Pt denies any cp, SOB, NVD, HA. Respirations even & unlabored. No apparent distress present. Pt made aware of plan of care and verbalized understanding.

## 2022-06-29 NOTE — PATIENT PROFILE ADULT - FALL HARM RISK - HARM RISK INTERVENTIONS

## 2022-06-29 NOTE — H&P ADULT - ASSESSMENT
73 yo male with history of CAD (2 stents in 2010 after anterior wall MI; symptoms were chest pressure), HTN, HLD, [place], CKD ii who presents from home with episodes of dyspnea and palpitations on exertion as well as presyncope. Last episode of presyncope was during an office visit for trigger point injections where after receiving therapy stood up and felt 'as if a tidal wave hit me'. He was lightheaded, dizzy, and diaphoretic with palpitations. He felt better after sitting down. For his symptoms he was placed on a HM and they captured an episode of NSVT which correlated with his episodes of dyspnea/palpitations. Patient had recently decreased his dose of metoprolol from 50mg to 25 mg and was advised to increase back to 50mg daily. Over the last 2 days he has had elevated blood pressure and episodes of jaw pain. Patient came to the ED on recommendation of cardiology.    In the ED per ED resident had a short run of NSVT, however was asymptomatic. Seen by EP as well as cardiology; he was offered cath but patient concerned about his CKD so will go for NST tomorrow - may still require cath however even if normal and persistent NSVT.      #symptomatic NSVT with jaw pain  - admit to tele  - NPO for NST in the AM  - potentially will need cath  - first trop normal  - cardiology and EP following  - echo pending  - continue aspirin  - off plavix  - continue statin  - continue metoprolol    #NSVT  - amiodarone if symptomatic vs cardioversion if sustained and unstable  - EP following  - continue higher dose metoprolol    #HTN  - continue antihypertensives from home    #CKD II  - trend BMP  - will need fluids pre/post cath if ends up going    PLan discussed with wife and patient.

## 2022-06-29 NOTE — H&P ADULT - NSICDXPASTSURGICALHX_GEN_ALL_CORE_FT
PAST SURGICAL HISTORY:  History of ankle surgery left ankle pin    History of hemorrhoids procedure for hemorrhoids in past    History of loop recorder     History of tonsillectomy     S/P appendectomy     S/P excision of lipoma     S/P hernia repair bilateral hernia of groin    S/P umbilical hernia repair, follow-up exam     Status post insertion of drug-eluting stent into left anterior descending (LAD) artery for coronary

## 2022-06-29 NOTE — ED ADULT TRIAGE NOTE - CHIEF COMPLAINT QUOTE
C/o intermittent dizziness x1 week. Pt states, "I was getting a massage and felt like I was going to pass out". Hx of CAD and HTN.

## 2022-06-29 NOTE — H&P ADULT - NSICDXFAMILYHX_GEN_ALL_CORE_FT
FAMILY HISTORY:  Father  Still living? No  Family history of coronary artery bypass surgery, Age at diagnosis: 61-70  FH: glaucoma, Age at diagnosis: Age Unknown  FH: heart disease, Age at diagnosis: Age Unknown  FH: kidney disease, Age at diagnosis: Age Unknown  FH: prostate cancer, Age at diagnosis: Age Unknown    Mother  Still living? No  Family history of diabetes mellitus, Age at diagnosis: Age Unknown  FH: pancreatic cancer, Age at diagnosis: Age Unknown    Sibling  Still living? Yes, Estimated age: Age Unknown  Family history of malignant neoplasm of ovary, Age at diagnosis: Age Unknown  FH: glaucoma, Age at diagnosis: Age Unknown

## 2022-06-29 NOTE — ED ADULT NURSE REASSESSMENT NOTE - NS ED NURSE REASSESS COMMENT FT1
While speaking to RN, pt had run of 7 beats of VTACH. Pt denies any chest pain at this time. ED monitor tech confirmed rhythm. MD Gray made aware. No apparent distress present. While speaking to RN, pt had run of 7 beats of VTACH. Pt denies any chest pain at this time. ED monitor tech confirmed rhythm. MD Gray made aware. Pt placed on cardiac pads. No apparent distress present.

## 2022-06-29 NOTE — ED ADULT NURSE NOTE - NSICDXPASTMEDICALHX_GEN_ALL_CORE_FT
PAST MEDICAL HISTORY:  BPH (benign prostatic hyperplasia)     Coronary artery disease involving native coronary artery of native heart with unstable angina pector     COVID-19 vaccine series completed Moderna x 2    [place] (hard of hearing) Understands lip reading and loud, low volume speech. Does not know sign language.    Hypertension     Hypertension     MI (myocardial infarction) may 2010 stents x 2    Mixed hyperlipidemia     NSVT (nonsustained ventricular tachycardia)     Stage 3 chronic kidney disease     TIA (transient ischemic attack)

## 2022-06-29 NOTE — CONSULT NOTE ADULT - PROBLEM SELECTOR RECOMMENDATION 9
Patient has history of prior PCI of LAD in 2010.  Patient does not want LHC at this time secondary to CKD. Will do NST in AM, patient aware that even with a normal NST, a LHC may still be indicated.  Repeat troponin.  IV hydration started for renal protection in case LHC is indicated.  Aspirin 81 mg daily.  Continue metoprolol 50 mg BID.  Continue Lipitor 10 mg daily.  Echo

## 2022-06-29 NOTE — ED PROVIDER NOTE - PHYSICAL EXAMINATION
General: Well appearing in no acute distress. Alert and cooperative.   Head: Normocephalic, atraumatic.  Eyes: PERRLA. No conjunctival injection. No scleral icterus. EOMI  ENMT: Atraumatic external nose and ears.   Neck: Soft and supple. Full ROM without pain.   Cardiac: Regular rate and regular rhythm. No murmurs. No LE edema.  Resp: Unlabored respiratory effort. Lungs CTAB.   Abd: Soft, non-tender, non-distended.   MSK: Spine midline and non-tender.   Skin: Warm and dry.   Neuro: AO x 3. Moves all extremities symmetrically. Motor strength and sensation grossly intact.

## 2022-06-29 NOTE — H&P ADULT - HISTORY OF PRESENT ILLNESS
73 yo male with history of CAD (2 stents in 2010 after anterior wall MI; symptoms were chest pressure), HTN, HLD, [place], CKD ii who presents from home with episodes of dyspnea and palpitations on exertion as well as presyncope. Last episode of presyncope was during an office visit for trigger point injections where after receiving therapy stood up and felt 'as if a tidal wave hit me'. He was lightheaded, dizzy, and diaphoretic with palpitations. He felt better after sitting down. For his symptoms he was placed on a HM and they captured an episode of NSVT which correlated with his episodes of dyspnea/palpitations. Patient had recently decreased his dose of metoprolol from 50mg to 25 mg and was advised to increase back to 50mg daily. Over the last 2 days he has had elevated blood pressure and episodes of jaw pain. Patient came to the ED on recommendation of cardiology.    In the ED per ED resident had a short run of NSVT, however was asymptomatic. Seen by EP as well as cardiology; he was offered cath but patient concerned about his CKD so will go for NST tomorrow - may still require cath however even if normal and persistent NSVT.

## 2022-06-29 NOTE — ED ADULT NURSE REASSESSMENT NOTE - NS ED NURSE REASSESS COMMENT FT1
Transport on unit to take pt to echo w/o monitor as order states. RN confirmed with MD Gray of order details, gave permission for pt to go to echo without monitor. Pt stable @ this time. VSS. Respirations even & unlabored. NAD present. Pt made aware of plan of care and verbalized understanding.

## 2022-06-29 NOTE — CONSULT NOTE ADULT - ASSESSMENT
71y/o male never smoker, [facility], with history of CAD, MI, prior PCI in 5/2010 (3.5 X 23 and 3.5 X 8 Prom[employer] ADRIAN's in the LAD, carotid artery disease, NSVT, stager 3 CKD (follows with Dr. Semaj Burnette), HTN, and HLD who was in a "wellness center" having his arm massaged when he a sudden onset of dizziness/lightheadedness with diaphoresis and jaw pain. He states he thought he was going to pass out. He states he had 2 prior episodes on Thursday and Sunday. He had a recent heart monitor which showed 2 episodes of VT (25 beats at 165 bpm, and 4 beats at 167 bpm) and multiple episodes of SVT. Earlier today he had a run of 7 beats of NSVT.

## 2022-06-29 NOTE — ED ADULT NURSE NOTE - INTERVENTIONS DEFINITIONS
[place] to call system/Instruct patient to call for assistance/Stretcher in lowest position, wheels locked, appropriate side rails in place/Monitor gait and stability/Monitor for mental status changes and reorient to person, place, and time/Review medications for side effects contributing to fall risk/Reinforce activity limits and safety measures with patient and family

## 2022-06-29 NOTE — CONSULT NOTE ADULT - SUBJECTIVE AND OBJECTIVE BOX
[facility]                                              CARDIOLOGY AT [address], [place] [address]                                             Telephone: [phone]. Fax:[phone]                                                         CARDIOLOGY CONSULTATION NOTE                                                                                             Consult requested by: Vel Hwang  History obtained by: Patient and medical record  Community Cardiologist: Florian Quiroz   obtained: Yes [  ] No [X]  Reason for Consultation: Near syncope, NSVT  Available out pt records reviewed: Yes [X] No [  ]    Chief complaint: Patient is a 72y old  Male who presents with a chief complaint of Palpitations (29 Jun 2022 13:19)    HPI: 71y/o male never smoker, [place], with history of CAD, MI, prior PCI in 5/2010 (3.5 X 23 and 3.5 X 8 [employer] ADRIAN's in the LAD, carotid artery disease, NSVT . He had a heart monitor which showed 2 episodes of VT (25 beats at 165 bpm, and 4 beats at 167 bpm) and multiple episodes of SVT.    CARDIAC TESTING   ECHO: 10/18/2021       LV: Low normal LVSF with EF of 50-55%, grade 1 LVDD. Akinetic basal anteroseptal segment, apical septal segment, and apical inferior segment. Hypokinetic apex.       RV: Not well visualized.       LA: Normal       RA: Normal       Mitral Valve: Trace MR       Aortic Valve: Trileaflet, mild to moderate AR       Tricuspid Valve: Trivial TR, RVSP: 44 (mild pulmonary hypertension)       Pulmonic Valve: Not well visualized, mild KS       Pericardium: No pericardial effusion.    STRESS: 8/11/2018       Protocol: David       Exercised for: 5:00       Symptoms: None       Stress EKG: Negative       DTS: 5       Nuclear Imaging: A large fixed defect of the apical wall of moderate to severe intensity, c/w infarct.    CATH:     ELECTROPHYSIOLOGY:       PAST MEDICAL HISTORY  [place] (hard of hearing)  MI (myocardial infarction)  High cholesterol  Stented coronary artery  Hypertension  TIA (transient ischemic attack)  Hyperlipemia  Hypertension  Deaf  Paresthesia  BPH (benign prostatic hyperplasia)  COVID-19 vaccine series completed  Stage 3 chronic kidney disease  Coronary artery disease involving native coronary artery of native heart with unstable angina pector  Mixed hyperlipidemia  NSVT (nonsustained ventricular tachycardia)    PAST SURGICAL HISTORY  S/P umbilical hernia repair, follow-up exam  S/P appendectomy  S/P hernia repair  ILR implant and subsequent explant  History of ankle surgery  History of hemorrhoids  Status post insertion of drug-eluting stent into left anterior descending (LAD) artery for coronary  History of tonsillectomy  S/P excision of lipoma    SOCIAL HISTORY:  Denies smoking/alcohol/drugs  CIGARETTES: Never smoker  ALCOHOL: 1 drink per week  DRUGS: Denies  Caffeine: 1 cup Coffee per day, occasional soda  Marital: , lives with wife    FAMILY HISTORY:  FH: heart disease (Father)  FH: prostate cancer (Father)  FH: pancreatic cancer (Mother)  FH: kidney disease (Father): ESRD, was on dialysis  FH: glaucoma (Father)  FH: glaucoma (Sibling)  Family history of coronary artery bypass surgery (Father)  Family history of diabetes mellitus (Mother)  Family history of malignant neoplasm of ovary (Sibling)    Family History of Cardiovascular Disease:  Yes [X] No [  ]: Father CABG at age 62  Coronary Artery Disease in first degree relative: Yes [X] No [  ]: Father CABG at age 62  Sudden Cardiac Death in First degree relative: Yes [  ] No [X]    Home Medications:  amLODIPine 5 mg oral tablet: 1 tab(s) orally once a day (29 Jun 2022 14:07)  Aspir 81 81 mg oral tablet: 1 tab(s) orally twice a day (29 Jun 2022 14:07)  Lipitor 10 mg oral tablet: 1 tab(s) orally once a day (29 Jun 2022 14:07)  losartan 100 mg oral tablet: 1 tab(s) orally once a day in the morning (29 Jun 2022 14:07)  Metoprolol Succinate ER 50 mg oral tablet, extended release: 1 tab(s) orally once a day in evening (29 Jun 2022 14:07)  tamsulosin 0.4 mg oral capsule: 1 cap(s) orally once a day (29 Jun 2022 14:07)    CURRENT MEDICATIONS:  sodium chloride 0.9%.    ALLERGIES: No Known Allergies    REVIEW OF SYMPTOMS:   CONSTITUTIONAL: No fever, no chills, no weight loss, no weight gain, no fatigue   ENMT:  No vertigo; No sinus or throat pain  NECK: No pain or stiffness  CARDIOVASCULAR: No chest pain, no dyspnea, no syncope/presyncope, no palpitations, no dizziness, no Orthopnea, no Paroxsymal nocturnal dyspnea  RESPIRATORY: no Shortness of breath, no cough, no wheezing  : No dysuria, no hematuria   GI: No dark color stool, no nausea, no diarrhea, no constipation, no abdominal pain   NEURO: No headache, no slurred speech   MUSCULOSKELETAL: No joint pain or swelling; No muscle, back, or extremity pain  PSYCH: No agitation, no anxiety.    ALL OTHER REVIEW OF SYSTEMS ARE NEGATIVE.    Vital Signs Last 24 Hrs  T(C): 37.4 (29 Jun 2022 11:25), Max: 37.4 (29 Jun 2022 11:25)  T(F): 99.3 (29 Jun 2022 11:25), Max: 99.3 (29 Jun 2022 11:25)  HR: 65 (29 Jun 2022 11:50) (65 - 75)  BP: 160/76 (29 Jun 2022 11:50) (160/76 - 193/95)  RR: 20 (29 Jun 2022 11:50) (18 - 20)  SpO2: 94% (29 Jun 2022 11:50) (94% - 94%)  INTAKE AND OUTPUT:     PHYSICAL EXAM:  Constitutional: Comfortable . No acute distress.   HEENT: Atraumatic and normocephalic , neck is supple . no JVD. No carotid bruit.  CNS: A&Ox3. No focal deficits.   Respiratory: CTAB, unlabored   Cardiovascular: RRR normal s1 s2. No murmur. No rubs or gallop.  Gastrointestinal: Soft, non-tender. +Bowel sounds.   MSK: full ROM extremities x 4  Extremities: No edema. No cyanosis   Psychiatric: Calm . no agitation.   Skin: Warm and dry, no ulcers on extremities     LABS:                        14.7   8.84  )-----------( 201      ( 29 Jun 2022 12:09 )             42.1     06-29    138  |  103  |  19.9  ----------------------------<  98  4.0   |  23.0  |  1.58    Ca    9.2      29 Jun 2022 12:09  Mg     2.1     06-29    TPro  6.7  /  Alb  4.0  /  TBili  0.6  /  DBili  x   /  AST  22  /  ALT  20  /  AlkPhos  86  06-29    CARDIAC MARKERS ( 29 Jun 2022 12:09 ): <0.01 ng/mL      ;p-BNP=Serum Pro-Brain Natriuretic Peptide: 196 pg/mL (06-29 @ 12:09)            INTERPRETATION OF TELEMETRY:     ECG:   Prior ECG: Yes [  ] No [  ]      RADIOLOGY & ADDITIONAL STUDIES:    X-ray:  reviewed by me.   CT scan:   MRI:   US:                                                [facility]                                              CARDIOLOGY AT [address], [place] [address]                                             Telephone: [phone]. Fax:[phone]                                                         CARDIOLOGY CONSULTATION NOTE                                                                                             Consult requested by: Vel Hwang  History obtained by: Patient and medical record  Community Cardiologist: Florian Quiroz   obtained: Yes [  ] No [X]  Reason for Consultation: Near syncope, NSVT  Available out pt records reviewed: Yes [X] No [  ]    Chief complaint: Patient is a 72y old  Male who presents with a chief complaint of Palpitations (29 Jun 2022 13:19)    HPI: 71y/o male never smoker, [place], with history of CAD, MI, prior PCI in 5/2010 (3.5 X 23 and 3.5 X 8 [employer] ADRIAN's in the LAD, carotid artery disease, NSVT, stager 3 CKD (follows with Dr. Semaj Burnette), HTN, and HLD who was in a "wellness center" having his arm massaged when he a sudden onset of dizziness/lightheadedness with diaphoresis and jaw pain. He states he thought he was going to pass out. He states he had 2 prior episodes on Thursday and Sunday. He had a recent heart monitor which showed 2 episodes of VT (25 beats at 165 bpm, and 4 beats at 167 bpm) and multiple episodes of SVT. Earlier today he had a run of 7 beats of NSVT. He admits to fatigue, occasional palpitations, and weakness.    CARDIAC TESTING   ECHO: 10/18/2021       LV: Low normal LVSF with EF of 50-55%, grade 1 LVDD. Akinetic basal anteroseptal segment, apical septal segment, and apical inferior segment. Hypokinetic apex.       RV: Not well visualized.       LA: Normal       RA: Normal       Mitral Valve: Trace MR       Aortic Valve: Trileaflet, mild to moderate AR       Tricuspid Valve: Trivial TR, RVSP: 44 (mild pulmonary hypertension)       Pulmonic Valve: Not well visualized, mild UT       Pericardium: No pericardial effusion.    STRESS: 8/11/2018       Protocol: David       Exercised for: 5:00       Symptoms: None       Stress EKG: Negative       DTS: 5       Nuclear Imaging: A large fixed defect of the apical wall of moderate to severe intensity, c/w infarct.    B/L Carotid Dopplers: 9/17/2016       Right:            CCA: Mild intimal media thickening of the proximal and distal RCCA            ICA: N/A            Vertebral Artery: The vertebral artery demonstrates antegrade flow.       Left:            CCA: Mild intimal media thickening of the proximal and distal LCCA            ICA: Mild amount calcified plaque at the bulb extending in to the LICA (mild stenosis)            Vertebral Artery: The vertebral artery demonstrates antegrade flow.    CATH:     ELECTROPHYSIOLOGY:     PAST MEDICAL HISTORY  [place] (hard of hearing)  MI (myocardial infarction)  High cholesterol  Stented coronary artery  Hypertension  TIA (transient ischemic attack)  Hyperlipemia  Hypertension  Deaf  Paresthesia  BPH (benign prostatic hyperplasia)  COVID-19 vaccine series completed  Stage 3 chronic kidney disease  Coronary artery disease involving native coronary artery of native heart with unstable angina pector  Mixed hyperlipidemia  NSVT (nonsustained ventricular tachycardia)    PAST SURGICAL HISTORY  S/P umbilical hernia repair, follow-up exam  S/P appendectomy  S/P hernia repair  ILR implant and subsequent explant  History of ankle surgery  History of hemorrhoids  Status post insertion of drug-eluting stent into left anterior descending (LAD) artery for coronary  History of tonsillectomy  S/P excision of lipoma    SOCIAL HISTORY:  Denies smoking/alcohol/drugs  CIGARETTES: Never smoker  ALCOHOL: 1 drink per week  DRUGS: Denies  Caffeine: 1 cup Coffee per day, occasional soda  Marital: , lives with wife    FAMILY HISTORY:  FH: heart disease (Father)  FH: prostate cancer (Father)  FH: pancreatic cancer (Mother)  FH: kidney disease (Father): ESRD, was on dialysis  FH: glaucoma (Father)  FH: glaucoma (Sibling)  Family history of coronary artery bypass surgery (Father)  Family history of diabetes mellitus (Mother)  Family history of malignant neoplasm of ovary (Sibling)    Family History of Cardiovascular Disease:  Yes [X] No [  ]: Father CABG at age 62  Coronary Artery Disease in first degree relative: Yes [X] No [  ]: Father CABG at age 62  Sudden Cardiac Death in First degree relative: Yes [  ] No [X]    Home Medications:  amLODIPine 5 mg oral tablet: 1 tab(s) orally once a day (29 Jun 2022 14:07)  Aspir 81 81 mg oral tablet: 1 tab(s) orally twice a day (29 Jun 2022 14:07)  Lipitor 10 mg oral tablet: 1 tab(s) orally once a day (29 Jun 2022 14:07)  losartan 100 mg oral tablet: 1 tab(s) orally once a day in the morning (29 Jun 2022 14:07)  Metoprolol Succinate ER 50 mg oral tablet, extended release: 1 tab(s) orally once a day in evening (29 Jun 2022 14:07)  tamsulosin 0.4 mg oral capsule: 1 cap(s) orally once a day (29 Jun 2022 14:07)    CURRENT MEDICATIONS:  sodium chloride 0.9%.    ALLERGIES: No Known Allergies    REVIEW OF SYMPTOMS:   CONSTITUTIONAL: No fever, no chills, no weight loss, no weight gain, + fatigue   ENMT:  No vertigo; No sinus or throat pain. + hearing loss.  NECK: No pain or stiffness  CARDIOVASCULAR: No chest pain, no dyspnea, + near syncope, + occasional palpitations, + dizziness, no orthopnea, no paroxsymal nocturnal dyspnea  RESPIRATORY: no Shortness of breath, no cough, no wheezing  : No dysuria, + microscopic hematuria   GI: No dark color stool, no nausea, no diarrhea, no constipation, no abdominal pain   NEURO: No headache, no slurred speech. + weakness  MUSCULOSKELETAL: No joint pain or swelling; No muscle, back, or extremity pain  PSYCH: No agitation, no anxiety.    ALL OTHER REVIEW OF SYSTEMS ARE NEGATIVE.    Vital Signs Last 24 Hrs  T(C): 37.4 (29 Jun 2022 11:25), Max: 37.4 (29 Jun 2022 11:25)  T(F): 99.3 (29 Jun 2022 11:25), Max: 99.3 (29 Jun 2022 11:25)  HR: 65 (29 Jun 2022 11:50) (65 - 75)  BP: 160/76 (29 Jun 2022 11:50) (160/76 - 193/95)  RR: 20 (29 Jun 2022 11:50) (18 - 20)  SpO2: 94% (29 Jun 2022 11:50) (94% - 94%)  INTAKE AND OUTPUT:     PHYSICAL EXAM:  Constitutional: Comfortable . No acute distress.   HEENT: Atraumatic and normocephalic , neck is supple . no JVD. No carotid bruit.  CNS: A&Ox3. No focal deficits.   Respiratory: CTAB, unlabored   Cardiovascular: RRR normal s1 s2. No murmur. No rubs or gallop.  Gastrointestinal: Soft, non-tender. +Bowel sounds.   MSK: full ROM extremities x 4  Extremities: No edema. No cyanosis   Psychiatric: Calm . no agitation.   Skin: Warm and dry, no ulcers on extremities     LABS:                        14.7   8.84  )-----------( 201      ( 29 Jun 2022 12:09 )             42.1     06-29    138  |  103  |  19.9  ----------------------------<  98  4.0   |  23.0  |  1.58    Ca    9.2      29 Jun 2022 12:09  Mg     2.1     06-29    TPro  6.7  /  Alb  4.0  /  TBili  0.6  /  DBili  x   /  AST  22  /  ALT  20  /  AlkPhos  86  06-29    CARDIAC MARKERS ( 29 Jun 2022 12:09 ): <0.01 ng/mL      ;p-BNP=Serum Pro-Brain Natriuretic Peptide: 196 pg/mL (06-29 @ 12:09)      INTERPRETATION OF TELEMETRY:     ECG:   Prior ECG: Yes [  ] No [  ]      RADIOLOGY & ADDITIONAL STUDIES:    X-ray:  reviewed by me.   CT scan Head:   ·	There is no acute intracranial hemorrhage or major vascular distribution infarction (within limitations of mild streak artifact).  ·	Scattered hypodensities within the subcortical/periventricular white matter, bilateral gangliocapsular regions and michael, nonspecific but most likely a sequela of chronic small vessel ischemia and/or prior chronic lacunar infarcts. Linear hypoattenuation overlying the michael, likely artifactual due to calvarial beam hardening (image 12, series 2). Nonspecific punctate right basal ganglia calcifications incidentally noted.  ·	There is no mass effect, edema, or midline shift. The basal cisterns are patent. There is no hydrocephalus. No extra-axial fluid collection.  ·	Normal pituitary gland configuration. Normal punctate pineal gland  calcifications. No cerebellar tonsillar ectopia/herniation.  ·	The visualized orbits, paranasal sinuses and mastoid air cells are grossly clear.  ·	No acute calvarial fracture is identified.    MRI:   US:

## 2022-06-29 NOTE — H&P ADULT - NSHPPHYSICALEXAM_GEN_ALL_CORE
PHYSICAL EXAM:    General: in no acute distress  Eyes: PERRLA, EOMI; conjunctiva and sclera clear  Head: Normocephalic; atraumatic  ENMT: No nasal discharge; airway clear  Neck: Supple; non tender; no masses  Respiratory: No wheezes, rales or rhonchi  Cardiovascular: Regular rate and rhythm. S1 and S2 Normal; No murmurs, gallops or rubs  Gastrointestinal: Soft non-tender non-distended; Normal bowel sounds  Genitourinary: No costovertebral angle tenderness  Extremities: Normal range of motion, No clubbing, cyanosis or edema  Vascular: Peripheral pulses palpable 2+ bilaterally  Neurological: Alert and oriented x4  Skin: Warm and dry. No acute rash  Lymph Nodes: No acute cervical adenopathy  Musculoskeletal: Normal gait, tone, without deformities  Psychiatric: Cooperative and appropriate PHYSICAL EXAM:  General: in no acute distress  Eyes: PERRLA, EOMI; conjunctiva and sclera clear  Head: Normocephalic; atraumatic  ENMT: No nasal discharge; airway clear  Neck: Supple; non tender; no masses  Respiratory: No wheezes, rales or rhonchi  Cardiovascular: Regular rate and rhythm. S1 and S2 Normal; No murmurs, gallops or rubs  Gastrointestinal: Soft non-tender non-distended; Normal bowel sounds  Genitourinary: No costovertebral angle tenderness  Extremities: Normal range of motion, No clubbing, cyanosis or edema  Vascular: Peripheral pulses palpable 2+ bilaterally  Neurological: Alert and oriented x4  Skin: Warm and dry. No acute rash  Lymph Nodes: No acute cervical adenopathy  Musculoskeletal: Normal gait, tone, without deformities  Psychiatric: Cooperative and appropriate

## 2022-06-29 NOTE — ED ADULT NURSE NOTE - ED STAT RN HANDOFF DETAILS
Report given to HANSA Archuleta. Pt A&Ox4, resps even/unlabored, NAD present @ this time. Plan of care discussed with pt, pt verbalized understanding.

## 2022-06-29 NOTE — ED PROVIDER NOTE - ATTENDING CONTRIBUTION TO CARE
72 YOM pmh of HTN, HLD, MI (2010) s/p PCI x 2 presenting with intermittent lightheadedness and dizziness x 1 week with episodes of feeling flushed and palpitations on exertion. Saw his cardiologist Dr. Quiroz yesterday and scheduled for outpt stress in 1 week. denies any cp or sob at rest. Denies fevers, chills, headache, cough, nausea, vomiting, diarrhea  AP - well appearing, ekg similar to prior. no symptoms at rest. exertional dizziness. will get serial trops, tele monitoring. cardiology consult .

## 2022-06-29 NOTE — CONSULT NOTE ADULT - SUBJECTIVE AND OBJECTIVE BOX
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 For the past week he reports he has had episodes of dizziness, flushed feeling and plapitations which would occur on exertion. Noted on last office visit on 22 to have complaints of intermittent palpitations. He had a ZioPatch ordered and showed 2 runs NSVT, longest 25 beats (150bpm rage). Pt was instructed to increase to Metoprolol 50 mg daily, which he did for approx. 5 days. He had reported fatigue and was told to decrease Toprol back down to 25 mg daily. He reported jaw pain and was advised to come in today for evaluation.    PAST MEDICAL & SURGICAL HISTORY:  [place] (hard of hearing)  Understands lip reading and loud, low volume speech. Does not know sign language.  MI (myocardial infarction)  may 2010 stents x 2  High cholesterol  Stented coronary artery  x 2  Hypertension  TIA (transient ischemic attack)  Hyperlipemia  CAD S/P percutaneous coronary angioplasty  stents x 2  Hypertension  Deaf  Paresthesia  BPH (benign prostatic hyperplasia)  COVID-19 vaccine series completed  Moderna x 2  Stage 3 chronic kidney disease  Stented coronary artery  x 2  S/P umbilical hernia repair, follow-up exam  S/P appendectomy  S/P hernia repair  bilateral hernia of groin  History of ankle surgery  left ankle pin  History of hemorrhoids  procedure for hemorrhoids in past    REVIEW OF SYSTEMS  General:	  Skin/Breast:	  Ophthalmologic:	  ENMT:	  Respiratory and Thorax:  Cardiovascular:	  Gastrointestinal:	  Genitourinary:	  Musculoskeletal:	  Neurological:	  Psychiatric:	    MEDICATIONS  (STANDING):    Allergies  No Known Allergies    SOCIAL HISTORY:    FAMILY HISTORY:  FH: heart disease (Father)  FH: prostate cancer (Father)  FH: pancreatic cancer (Mother)  FH: kidney disease (Father)  ESRD, was on dialysis  FH: glaucoma (Father)  FH: glaucoma (Sibling)    Vital Signs Last 24 Hrs  T(C): 37.4 (2022 11:25), Max: 37.4 (2022 11:25)  T(F): 99.3 (2022 11:25), Max: 99.3 (2022 11:25)  HR: 65 (2022 11:50) (65 - 75)  BP: 160/76 (2022 11:50) (160/76 - 193/95)  RR: 20 (2022 11:50) (18 - 20)  SpO2: 94% (2022 11:50) (94% - 94%)    Physical Exam:  Constitutional: AAOx3, NAD  Neck: supple, No JVD  Cardiovascular: +S1S2 RRR, no murmurs, rubs, gallops   Pulmonary: CTA b/l, unlabored, no wheezes, rales. rhonci  Abdomen: +BS, soft NTND  Extremities: no edema b/l, +distal pulses b/l  Neuro: non focal, speech clear, CORONEL x 4    LABS:                        14.7   8.84  )-----------( 201      ( 2022 12:09 )             42.1     138  |  103  |  19.9  ----------------------------<  98  4.0   |  23.0  |  1.58<H>  Ca    9.2      2022 12:09  Mg     2.1     06-29  TPro  6.7  /  Alb  4.0  /  TBili  0.6  /  DBili  x   /  AST  22  /  ALT  20  /  AlkPhos  86  06-29  LIVER FUNCTIONS - ( 2022 12:09 )  Alb: 4.0 g/dL / Pro: 6.7 g/dL / ALK PHOS: 86 U/L / ALT: 20 U/L / AST: 22 U/L / GGT: x         CARDIAC MARKERS ( 2022 12:09 )  x     / <0.01 ng/mL / x     / x     / x        RADIOLOGY & ADDITIONAL STUDIES:  Echo: 10/18/2021: EF 50-55%; mild pulm HTN; grade I diastolic dysfunction; no pericardial effusion noted  EK22: SR at 68bpm; QRSD 140ms: RBBB + LAHB; [place]: 122ms    A/P  72 year old male patient with a history of CAD s/p prior AWMI s/p primary PCI with ADRIAN to LAD in  (Promus 3.5 x 23 mm and 3.5 x 8 mm), HTN, 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      Information obtained from patient and wife Mónica.     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 He reports he was receiving a medical massage on his LUE and started to feel a flushed senation when he sat up. This episodes persisted for around 20 minutes. Prior to this episodes he had two other incidents this week witnessed by his wife where he would be walking and suddenly have that flushed sensation and nearly pass out. He had enough time to sit down prior to passing out. These who prior events were in the setting of walking.    On last office visit on 22 to have complaints of intermittent palpitations. He had a ZioPatch ordered and showed 2 runs NSVT, longest 25 beats (150bpm rage). Pt was instructed to increase to Metoprolol 50 mg daily, which he did for approx. 5 days. He had reported fatigue and was told to decrease Toprol back down to 25 mg daily. He reported jaw pain and was advised to come in today for evaluation.    From an EP perspective the patient reports he was seen by Dr. Julian in the past. Underwent a EPS in  for unexplained syncope which revealed a HV interval of 48ms. An ILR was inserted the following day and explanted two years later in 2017. According to the patient no significant events were recorded.     PAST MEDICAL & SURGICAL HISTORY:  [place] (hard of hearing)  Understands lip reading and loud, low volume speech. Does not know sign language.  MI (myocardial infarction)  may 2010 stents x 2  High cholesterol  Stented coronary artery  x 2  Hypertension  TIA (transient ischemic attack)  Hyperlipemia  CAD S/P percutaneous coronary angioplasty  stents x 2  Hypertension  Deaf  Paresthesia  BPH (benign prostatic hyperplasia)  COVID-19 vaccine series completed  Moderna x 2  Stage 3 chronic kidney disease  Stented coronary artery  x 2  S/P umbilical hernia repair, follow-up exam  S/P appendectomy  S/P hernia repair  bilateral hernia of groin  History of ankle surgery  left ankle pin  History of hemorrhoids  procedure for hemorrhoids in past    REVIEW OF SYSTEMS  General: - fever or chills, - fatigue. Good appetite	  Skin/Breast: - rashes  Ophthalmologic: - blurred vision	  ENMT: - sore throat  Respiratory and Thorax: - cough  Cardiovascular: + palpitations, + near syncope, - chest pain,   Gastrointestinal:	- N/V/D/C  Genitourinary: - dysuria  Musculoskeletal:	 - arthritis  Neurological: + [place]  Psychiatric: - weaknesses    MEDICATIONS  (STANDING):    Allergies  No Known Allergies    SOCIAL HISTORY: non smoker, social ETOH, 1 cup coffee daily     FAMILY HISTORY:  FH: heart disease (Father)  FH: prostate cancer (Father)  FH: pancreatic cancer (Mother)  FH: kidney disease (Father)  ESRD, was on dialysis  FH: glaucoma (Father)  FH: glaucoma (Sibling)    Vital Signs Last 24 Hrs  T(C): 37.4 (2022 11:25), Max: 37.4 (2022 11:25)  T(F): 99.3 (2022 11:25), Max: 99.3 (2022 11:25)  HR: 65 (2022 11:50) (65 - 75)  BP: 160/76 (2022 11:50) (160/76 - 193/95)  RR: 20 (2022 11:50) (18 - 20)  SpO2: 94% (2022 11:50) (94% - 94%)    Physical Exam:  Constitutional: AAOx3, NAD  Neck: supple, No JVD  Cardiovascular: +S1S2 RRR, 2/6 MAR at LSB   Pulmonary: CTA b/l, unlabored, no wheezes, rales. No rhonchi  Abdomen: +BS, soft NTND  Extremities: no edema b/l,   Neuro: non focal, speech clear, CORONEL x 4    LABS:                        14.7   8.84  )-----------( 201      ( 2022 12:09 )             42.1     138  |  103  |  19.9  ----------------------------<  98  4.0   |  23.0  |  1.58<H>  Ca    9.2      2022 12:09  Mg     2.1     -  TPro  6.7  /  Alb  4.0  /  TBili  0.6  /  DBili  x   /  AST  22  /  ALT  20  /  AlkPhos  86    LIVER FUNCTIONS - ( 2022 12:09 )  Alb: 4.0 g/dL / Pro: 6.7 g/dL / ALK PHOS: 86 U/L / ALT: 20 U/L / AST: 22 U/L / GGT: x         CARDIAC MARKERS ( 2022 12:09 )  x     / <0.01 ng/mL / x     / x     / x        RADIOLOGY & ADDITIONAL STUDIES:  Echo: 10/18/2021: EF 50-55%; mild pulm HTN; grade I diastolic dysfunction; no pericardial effusion noted  EK22: SR at 68bpm; QRSD 140ms: RBBB + LAHB; [place]: 122ms    A/P  72 year old male patient with a history of CAD s/p prior AWMI s/p primary PCI with ADRIAN to LAD in  (Promus 3.5 x 23 mm and 3.5 x 8 mm), HTN, 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     8 beats of NSVT recorded in ER. No evidence of heart block    - Recommend Cardiology Consult - may require ischemic evaluation. ST +/- LHC   - Repeat Echo  - EPS in the past was normal (HV normal, SNRT normal, no inducible arrythmias) and reports he would be reluctant to have yet another ILR inserted  - Check orthostatics  - Check TSH  - Full recommendations to follow.

## 2022-06-29 NOTE — CONSULT NOTE ADULT - PROBLEM SELECTOR RECOMMENDATION 4
Goal Non-HDL < 100, LDL < 70  Lipid Panel: In AM  Statin: Lipitor 10 mg daily, will increase according to ischemic work-up and lipid panel.  Other: N/A

## 2022-06-29 NOTE — H&P ADULT - NSICDXPASTMEDICALHX_GEN_ALL_CORE_FT
PAST MEDICAL HISTORY:  BPH (benign prostatic hyperplasia)     Coronary artery disease involving native coronary artery of native heart with unstable angina pector     COVID-19 vaccine series completed Moderna x 2    [place] (hard of hearing) Understands lip reading and loud, low volume speech. Does not know sign language.    Hypertension     Hypertension     MI (myocardial infarction) may 2010 stents x 2    Mixed hyperlipidemia     NSVT (nonsustained ventricular tachycardia)     Stage 3 chronic kidney disease     TIA (transient ischemic attack)

## 2022-06-29 NOTE — CONSULT NOTE ADULT - NS ATTEND AMEND GEN_ALL_CORE FT
Agree with above  will follow up
agree with above,    72M history significant for CAD/anterior wall MI 2010, CKD (baseline Cr. 1.6) recent recurrent palpitations found with monomorphic NSVTs (up to 25 beats) on extended Holter outpatient started on metoprolol succinate 50mg but reports fatigue/leg weakness reduced to 25mg declined LHC for NSVT and wants nuclear stress test first, seen again in the office yesterday with palpitations metoprolol dose increased back to 50mg, this morning with an episode of "flushing/dizziness" for 15 minutes and presented to the ER, telemetry with recurrent NSVT 7 beats  -patient still wants to defer LHC as concern about HECTOR, will arrange for pharm nuclear stress test in AM, but explained if recurrent NSVT with normal stress result would recommend LHC, IVF pre-hydration   -continue metoprolol consider increase dose to 100mg as tolerate  -repeat Echo for LV EF (last in 10/2021 49% in the office)  -EPS eval. for recurrent NSVT        Issac Caban DO, [facility]  Faculty Non-Invasive Cardiologist  [phone]

## 2022-06-29 NOTE — CONSULT NOTE ADULT - PROBLEM SELECTOR RECOMMENDATION 2
Patient had multiple episodes of VT on Holter monitor, and an episode on NSVT today.  Telemetry monitoring.  EP consult  Monitor electrolytes (K>4, Mg>2)  Continue metoprolol 50 mg BID.

## 2022-06-29 NOTE — ED PROVIDER NOTE - CLINICAL SUMMARY MEDICAL DECISION MAKING FREE TEXT BOX
73yo male with history of HTN, HLD, MI (2010) s/p PCI x 2 presenting with intermittent lightheadedness and dizziness x 1 week with episodes of feeling flushed and palpitations on exertion without shortness of breath or chest pain. No syncope. Afebrile, rrr, lungs clear, abdomen soft, nontender, neurovascularly intact. non ischemic ecg. Labs, cxr, CT head, reassess.

## 2022-06-29 NOTE — ED PROVIDER NOTE - OBJECTIVE STATEMENT
73yo male with history of HTN, HLD, MI (2010) s/p PCI x 2 presenting with intermittent lightheadedness and dizziness x 1 week with episodes of feeling flushed and palpitations on exertion without shortness of breath or chest pain. Denies syncope or traumatic events. Was evaluated by cardiology outpatient yesterday with plans for stress testing that has not been complete yet. Chest pain free. Denies fevers, chills, headache, cough, nausea, vomiting, diarrhea, hematuria, dysuria, dark stools, focal neurologic symptoms.
Statement Selected

## 2022-06-30 LAB
A1C WITH ESTIMATED AVERAGE GLUCOSE RESULT: 5.1 % — SIGNIFICANT CHANGE UP (ref 4–5.6)
ALBUMIN SERPL ELPH-MCNC: 3.8 G/DL — SIGNIFICANT CHANGE UP (ref 3.3–5.2)
ALP SERPL-CCNC: 76 U/L — SIGNIFICANT CHANGE UP (ref 40–120)
ALT FLD-CCNC: 21 U/L — SIGNIFICANT CHANGE UP
ANION GAP SERPL CALC-SCNC: 12 MMOL/L — SIGNIFICANT CHANGE UP (ref 5–17)
ANION GAP SERPL CALC-SCNC: 9 MMOL/L — SIGNIFICANT CHANGE UP (ref 5–17)
AST SERPL-CCNC: 21 U/L — SIGNIFICANT CHANGE UP
BILIRUB SERPL-MCNC: 0.6 MG/DL — SIGNIFICANT CHANGE UP (ref 0.4–2)
BUN SERPL-MCNC: 20 MG/DL — SIGNIFICANT CHANGE UP (ref 8–20)
BUN SERPL-MCNC: 20.1 MG/DL — HIGH (ref 8–20)
CALCIUM SERPL-MCNC: 8.3 MG/DL — LOW (ref 8.6–10.2)
CALCIUM SERPL-MCNC: 8.5 MG/DL — LOW (ref 8.6–10.2)
CHLORIDE SERPL-SCNC: 104 MMOL/L — SIGNIFICANT CHANGE UP (ref 98–107)
CHLORIDE SERPL-SCNC: 105 MMOL/L — SIGNIFICANT CHANGE UP (ref 98–107)
CHOLEST SERPL-MCNC: 138 MG/DL — SIGNIFICANT CHANGE UP
CO2 SERPL-SCNC: 26 MMOL/L — SIGNIFICANT CHANGE UP (ref 22–29)
CO2 SERPL-SCNC: 28 MMOL/L — SIGNIFICANT CHANGE UP (ref 22–29)
CREAT SERPL-MCNC: 1.6 MG/DL — HIGH (ref 0.5–1.3)
CREAT SERPL-MCNC: 1.64 MG/DL — HIGH (ref 0.5–1.3)
EGFR: 44 ML/MIN/1.73M2 — LOW
EGFR: 46 ML/MIN/1.73M2 — LOW
ESTIMATED AVERAGE GLUCOSE: 100 MG/DL — SIGNIFICANT CHANGE UP (ref 68–114)
GLUCOSE BLDC GLUCOMTR-MCNC: 88 MG/DL — SIGNIFICANT CHANGE UP (ref 70–99)
GLUCOSE SERPL-MCNC: 91 MG/DL — SIGNIFICANT CHANGE UP (ref 70–99)
GLUCOSE SERPL-MCNC: 96 MG/DL — SIGNIFICANT CHANGE UP (ref 70–99)
HCT VFR BLD CALC: 41.4 % — SIGNIFICANT CHANGE UP (ref 39–50)
HCV AB S/CO SERPL IA: 0.1 S/CO — SIGNIFICANT CHANGE UP (ref 0–0.99)
HCV AB SERPL-IMP: SIGNIFICANT CHANGE UP
HDLC SERPL-MCNC: 33 MG/DL — LOW
HGB BLD-MCNC: 14.1 G/DL — SIGNIFICANT CHANGE UP (ref 13–17)
LIPID PNL WITH DIRECT LDL SERPL: 75 MG/DL — SIGNIFICANT CHANGE UP
MAGNESIUM SERPL-MCNC: 1.8 MG/DL — SIGNIFICANT CHANGE UP (ref 1.6–2.6)
MCHC RBC-ENTMCNC: 29.7 PG — SIGNIFICANT CHANGE UP (ref 27–34)
MCHC RBC-ENTMCNC: 34.1 GM/DL — SIGNIFICANT CHANGE UP (ref 32–36)
MCV RBC AUTO: 87.3 FL — SIGNIFICANT CHANGE UP (ref 80–100)
NON HDL CHOLESTEROL: 105 MG/DL — SIGNIFICANT CHANGE UP
PLATELET # BLD AUTO: 197 K/UL — SIGNIFICANT CHANGE UP (ref 150–400)
POTASSIUM SERPL-MCNC: 4.1 MMOL/L — SIGNIFICANT CHANGE UP (ref 3.5–5.3)
POTASSIUM SERPL-MCNC: 4.1 MMOL/L — SIGNIFICANT CHANGE UP (ref 3.5–5.3)
POTASSIUM SERPL-SCNC: 4.1 MMOL/L — SIGNIFICANT CHANGE UP (ref 3.5–5.3)
POTASSIUM SERPL-SCNC: 4.1 MMOL/L — SIGNIFICANT CHANGE UP (ref 3.5–5.3)
PROT SERPL-MCNC: 6.3 G/DL — LOW (ref 6.6–8.7)
RBC # BLD: 4.74 M/UL — SIGNIFICANT CHANGE UP (ref 4.2–5.8)
RBC # FLD: 13.2 % — SIGNIFICANT CHANGE UP (ref 10.3–14.5)
SODIUM SERPL-SCNC: 141 MMOL/L — SIGNIFICANT CHANGE UP (ref 135–145)
SODIUM SERPL-SCNC: 142 MMOL/L — SIGNIFICANT CHANGE UP (ref 135–145)
TRIGL SERPL-MCNC: 150 MG/DL — HIGH
TSH SERPL-MCNC: 1.83 UIU/ML — SIGNIFICANT CHANGE UP (ref 0.27–4.2)
WBC # BLD: 9.07 K/UL — SIGNIFICANT CHANGE UP (ref 3.8–10.5)
WBC # FLD AUTO: 9.07 K/UL — SIGNIFICANT CHANGE UP (ref 3.8–10.5)

## 2022-06-30 PROCEDURE — 93018 CV STRESS TEST I&R ONLY: CPT

## 2022-06-30 PROCEDURE — 78452 HT MUSCLE IMAGE SPECT MULT: CPT | Mod: 26

## 2022-06-30 PROCEDURE — 99232 SBSQ HOSP IP/OBS MODERATE 35: CPT

## 2022-06-30 PROCEDURE — 99233 SBSQ HOSP IP/OBS HIGH 50: CPT

## 2022-06-30 PROCEDURE — 93016 CV STRESS TEST SUPVJ ONLY: CPT

## 2022-06-30 RX ORDER — HEPARIN SODIUM 5000 [USP'U]/ML
5000 INJECTION INTRAVENOUS; SUBCUTANEOUS EVERY 8 HOURS
Refills: 0 | Status: DISCONTINUED | OUTPATIENT
Start: 2022-06-30 | End: 2022-07-01

## 2022-06-30 RX ADMIN — Medication 81 MILLIGRAM(S): at 16:57

## 2022-06-30 RX ADMIN — LOSARTAN POTASSIUM 100 MILLIGRAM(S): 100 TABLET, FILM COATED ORAL at 06:35

## 2022-06-30 RX ADMIN — ATORVASTATIN CALCIUM 10 MILLIGRAM(S): 80 TABLET, FILM COATED ORAL at 21:41

## 2022-06-30 RX ADMIN — TAMSULOSIN HYDROCHLORIDE 0.4 MILLIGRAM(S): 0.4 CAPSULE ORAL at 16:57

## 2022-06-30 RX ADMIN — HEPARIN SODIUM 5000 UNIT(S): 5000 INJECTION INTRAVENOUS; SUBCUTANEOUS at 21:43

## 2022-06-30 RX ADMIN — SODIUM CHLORIDE 50 MILLILITER(S): 9 INJECTION INTRAMUSCULAR; INTRAVENOUS; SUBCUTANEOUS at 21:42

## 2022-06-30 RX ADMIN — AMLODIPINE BESYLATE 5 MILLIGRAM(S): 2.5 TABLET ORAL at 06:35

## 2022-06-30 RX ADMIN — Medication 50 MILLIGRAM(S): at 16:57

## 2022-06-30 NOTE — PROGRESS NOTE ADULT - NS ATTEND AMEND GEN_ALL_CORE FT
Seen and examined, wife at bedside. hx obtained by myself and EP PA  Denies any prior syncope  symptoms are same as when he was wearing MCOT which c/w SR  NSVT with preserved EF. No bradycardia or AVB.    Offered ILR though I think the yield is low  No current indication for repeat EPS  Agree with uptitration of BB, counseled pt agreed    He also said that his BP and pulse wear measured on two occasions of his symptoms and was told his SBP was aroundf 170 and his pulse was normal    EPS will sign off, call us if needed Seen and examined, wife at bedside. hx obtained by myself and EP PA  Denies any prior syncope  symptoms are same as when he was wearing MCOT which c/w SR and occasionaly with SR and isolated PAC or PVC  NSVT with preserved EF. No bradycardia or AVB.    Offered ILR though I think the yield is low  No current indication for repeat EPS  Agree with uptitration of BB, counseled pt agreed    He also said that his BP and pulse wear measured on two occasions of his symptoms and was told his SBP was aroundf 170 and his pulse was normal    EPS will sign off, call us if needed  Can follow up with me as o/p if needed

## 2022-06-30 NOTE — PROGRESS NOTE ADULT - ASSESSMENT
73 y/o M w/ PMH CAD (pci x2 stents in 2010 after anterior wall MI), HTN, HLD, [place], CKD 3b came in c/o dyspnea and palpitations on exertion as well as presyncope.  Pt also had associated jaw pain and noted elevated BP.  Pt has had similar symptoms in the past and was placed on a holter monitor that captured NSVT during event.  Pt admitted for unstable angina.  while in the ED pt was again noted to have NSVT on monitor.  Pt was seen by cardiology and LHC was recommended but pt declined due to concern of his renal fxn.  Pt had stress test today and continues requiring hospitalization for telemetry monitoring and medication titration.      Unstable angina / NSVT   - NSVT on holter outpt and on monitor on admission   - Symptomatic w/ dyspnea, palpitations, near syncope and jaw pain   - s/p stress test today, results pending   - Amiodarone if symptomatic vs cardioversion if sustained vs unstable   - Troponins negative   - On ASA, statin, metoprolol    - Off plavix   - TTE w/ LVEF 50-55%  - Monitor on telemetry   - Maintain K>4 and Mg>2  - Cardiology and EP following      HTN  - c/w antihypertensives       CKD IIIb  - Cr at baseline   - Monitor renal fxn   - Avoid nephrotoxic meds or renally dose as needed       VTE ppx: heparin sq    Dispo: Pt remains acute requiring inpt hospitalization for medication titration and pending stress test report.

## 2022-06-30 NOTE — PROGRESS NOTE ADULT - SUBJECTIVE AND OBJECTIVE BOX
Patient seen today in bed prior to stress test. No overnight complaints. Telemetry reviewed     TELE: SR. No PVCs or NSVT recorded.     MEDICATIONS  (STANDING):  amLODIPine   Tablet 5 milliGRAM(s) Oral daily  aspirin  chewable 81 milliGRAM(s) Oral daily  atorvastatin 10 milliGRAM(s) Oral at bedtime  losartan 100 milliGRAM(s) Oral daily  metoprolol succinate ER 50 milliGRAM(s) Oral daily  sodium chloride 0.9%. 1000 milliLiter(s) (50 mL/Hr) IV Continuous <Continuous>  tamsulosin 0.4 milliGRAM(s) Oral daily    Allergies  No Known Allergies    PAST MEDICAL & SURGICAL HISTORY:  [place] (hard of hearing)  Understands lip reading and loud, low volume speech. Does not know sign language.  MI (myocardial infarction)  may 2010 stents x 2  Hypertension  TIA (transient ischemic attack)  Hypertension  BPH (benign prostatic hyperplasia)  COVID-19 vaccine series completed  Moderna x 2  Stage 3 chronic kidney disease  Coronary artery disease involving native coronary artery of native heart with unstable angina pector  Mixed hyperlipidemia  NSVT (nonsustained ventricular tachycardia)  S/P umbilical hernia repair, follow-up exam  S/P appendectomy  S/P hernia repair  bilateral hernia of groin  History of ankle surgery  left ankle pin  History of hemorrhoids  procedure for hemorrhoids in past  Status post insertion of drug-eluting stent into left anterior descending (LAD) artery for coronary  History of loop recorder  History of tonsillectomy  S/P excision of lipoma    Vital Signs Last 24 Hrs  T(C): 36.2 (30 Jun 2022 05:28), Max: 36.8 (29 Jun 2022 20:21)  T(F): 97.2 (30 Jun 2022 05:28), Max: 98.3 (29 Jun 2022 20:21)  HR: 63 (30 Jun 2022 05:28) (63 - 94)  BP: 159/84 (30 Jun 2022 05:28) (134/67 - 167/76)  RR: 19 (30 Jun 2022 05:28) (16 - 20)  SpO2: 97% (30 Jun 2022 05:28) (94% - 98%)    Physical Exam:  Constitutional: AAOx3, NAD  Neck: supple, No JVD  Cardiovascular: +S1S2 RRR, 2/6 MAR at LSB   Pulmonary: CTA b/l, unlabored, no wheezes, rales. No rhonchi  Abdomen: +BS, soft NTND  Extremities: no edema b/l,   Neuro: non focal, CORONEL x 4    LABS:                        14.1   9.07  )-----------( 197      ( 30 Jun 2022 06:36 )             41.4     142  |  104  |  20.0  ----------------------------<  91  4.1   |  26.0  |  1.60<H>  Ca    8.5<L>      30 Jun 2022 06:36  Mg     1.8     06-30  TPro  6.3<L>  /  Alb  3.8  /  TBili  0.6  /  DBili  x   /  AST  21  /  ALT  21  /  AlkPhos  76  06-30    Echo 6/29/22  Summary:   1. Left ventricular ejection fraction, by visual estimation, is 50 to 55%.   2. Technically adequate study.   3. Low normal global left ventricular systolic function.   4. Entire anterior septum, apical inferior segment, and apex are abnormal as described above.   5. Spectral Doppler shows impaired relaxation pattern of left ventricular myocardial filling (Grade I diastolic dysfunction).   6. Normal left atrial size.   7. Mild mitral annular calcification.   8. Mild thickening of the anterior and posterior mitral valve leaflets.   9. Trace mitral valve regurgitation.  10. Mild to moderate aortic regurgitation.    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     No further telemetry evidence of PVCs of HB    - Stress Test today  - TSH normal  - Orthostatics pending  - No plan for further inpatient EP testing  - Plan per Cardiology  - Will sign off. May see EP as outpatient.       Patient seen today in bed prior to stress test. No overnight complaints. Telemetry reviewed     TELE: SR. No PVCs or NSVT recorded.     MEDICATIONS  (STANDING):  amLODIPine   Tablet 5 milliGRAM(s) Oral daily  aspirin  chewable 81 milliGRAM(s) Oral daily  atorvastatin 10 milliGRAM(s) Oral at bedtime  losartan 100 milliGRAM(s) Oral daily  metoprolol succinate ER 50 milliGRAM(s) Oral daily  sodium chloride 0.9%. 1000 milliLiter(s) (50 mL/Hr) IV Continuous <Continuous>  tamsulosin 0.4 milliGRAM(s) Oral daily    Allergies  No Known Allergies    PAST MEDICAL & SURGICAL HISTORY:  [place] (hard of hearing)  Understands lip reading and loud, low volume speech. Does not know sign language.  MI (myocardial infarction)  may 2010 stents x 2  Hypertension  TIA (transient ischemic attack)  Hypertension  BPH (benign prostatic hyperplasia)  COVID-19 vaccine series completed  Moderna x 2  Stage 3 chronic kidney disease  Coronary artery disease involving native coronary artery of native heart with unstable angina pector  Mixed hyperlipidemia  NSVT (nonsustained ventricular tachycardia)  S/P umbilical hernia repair, follow-up exam  S/P appendectomy  S/P hernia repair  bilateral hernia of groin  History of ankle surgery  left ankle pin  History of hemorrhoids  procedure for hemorrhoids in past  Status post insertion of drug-eluting stent into left anterior descending (LAD) artery for coronary  History of loop recorder  History of tonsillectomy  S/P excision of lipoma    Vital Signs Last 24 Hrs  T(C): 36.2 (30 Jun 2022 05:28), Max: 36.8 (29 Jun 2022 20:21)  T(F): 97.2 (30 Jun 2022 05:28), Max: 98.3 (29 Jun 2022 20:21)  HR: 63 (30 Jun 2022 05:28) (63 - 94)  BP: 159/84 (30 Jun 2022 05:28) (134/67 - 167/76)  RR: 19 (30 Jun 2022 05:28) (16 - 20)  SpO2: 97% (30 Jun 2022 05:28) (94% - 98%)    Physical Exam:  Constitutional: AAOx3, NAD  Neck: supple, No JVD  Cardiovascular: +S1S2 RRR, 2/6 MAR at LSB   Pulmonary: CTA b/l, unlabored, no wheezes, rales. No rhonchi  Abdomen: +BS, soft NTND  Extremities: no edema b/l,   Neuro: non focal, CORONEL x 4    LABS:                        14.1   9.07  )-----------( 197      ( 30 Jun 2022 06:36 )             41.4     142  |  104  |  20.0  ----------------------------<  91  4.1   |  26.0  |  1.60<H>  Ca    8.5<L>      30 Jun 2022 06:36  Mg     1.8     06-30  TPro  6.3<L>  /  Alb  3.8  /  TBili  0.6  /  DBili  x   /  AST  21  /  ALT  21  /  AlkPhos  76  06-30    Echo 6/29/22  Summary:   1. Left ventricular ejection fraction, by visual estimation, is 50 to 55%.   2. Technically adequate study.   3. Low normal global left ventricular systolic function.   4. Entire anterior septum, apical inferior segment, and apex are abnormal as described above.   5. Spectral Doppler shows impaired relaxation pattern of left ventricular myocardial filling (Grade I diastolic dysfunction).   6. Normal left atrial size.   7. Mild mitral annular calcification.   8. Mild thickening of the anterior and posterior mitral valve leaflets.   9. Trace mitral valve regurgitation.  10. Mild to moderate aortic regurgitation.    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     No further telemetry evidence of PVCs of HB    - Stress Test today  - Would increase beta blockade. Patient agreeable  - TSH normal  - Orthostatics pending  - No plan for further inpatient EP testing  - Plan per Cardiology  - Will sign off. May see EP as outpatient.

## 2022-06-30 NOTE — PROGRESS NOTE ADULT - ASSESSMENT
Assessment and Recommendation:   · Assessment	  73y/o male never smoker, [place], with history of CAD, MI, prior PCI in 5/2010 (3.5 X 23 and 3.5 X 8 Promus ADRIAN's in the LAD, carotid artery disease, NSVT, stager 3 CKD (follows with Dr. Semaj Burnette), HTN, and HLD who was in a "wellness center" having his arm massaged when he a sudden onset of dizziness/lightheadedness with diaphoresis and jaw pain. He states he thought he was going to pass out. He states he had 2 prior episodes on Thursday and Sunday. He had a recent heart monitor which showed 2 episodes of VT (25 beats at 165 bpm, and 4 beats at 167 bpm) and multiple episodes of SVT. Now awaiting nuclear stress test.     Problem/Recommendation - 1:  ·  Problem: Coronary artery disease involving native coronary artery of native heart with unstable angina pector.   ·  Recommendation: Patient has history of prior PCI of LAD in 2010.  Patient does not want LHC at this time secondary to CKD. Will do NST today, patient aware that even with a normal NST, a LHC may still be indicated.  Troponin x 2 negative  IV hydration started for renal protection in case LHC is indicated.  Aspirin 81 mg daily.  Continue metoprolol 50 mg BID.  Continue Lipitor 10 mg daily.  Keep NPO nuclear stress test       Problem/Recommendation - 2:  ·  Problem: NSVT (nonsustained ventricular tachycardia).   ·  Recommendation: Patient had multiple episodes of VT on Holter monitor, and an episode on NSVT today.  Telemetry monitoring.  EP consult  Monitor electrolytes (K>4, Mg>2)  Continue metoprolol 50 mg BID.     Problem/Recommendation - 3:  ·  Problem: Syncope, near.      Problem/Recommendation - 4:  ·  Problem: Mixed hyperlipidemia.   ·  Recommendation: Goal Non-HDL < 100, LDL < 70  Statin: Lipitor 10 mg daily  Other: N/A.     Problem/Recommendation - 5:  ·  Problem: Primary hypertension.   ·  Recommendation: Continue metoprolol 50 mg BID  Continue losartan 100 mg daily.  Continue amlodipine 5 mg daily.     Problem/Recommendation - 6:  ·  Problem: Stenosis of left carotid artery.   ·  Recommendation: Carotid dopplers as an outpatient.

## 2022-06-30 NOTE — PROGRESS NOTE ADULT - SUBJECTIVE AND OBJECTIVE BOX
[facility]                                                        Office: [address] [address]                                                       Telephone: [phone]. Fax:[phone]      CARDIOLOGY PROGRESS NOTE   ([facility])    Subjective: "I feel ok." Awaiting nuclear stress test.     CURRENT MEDICATIONS  amLODIPine   Tablet 5 milliGRAM(s) Oral daily  losartan 100 milliGRAM(s) Oral daily  metoprolol succinate ER 50 milliGRAM(s) Oral daily  tamsulosin 0.4 milliGRAM(s) Oral daily            atorvastatin 10 milliGRAM(s) Oral at bedtime    aspirin  chewable 81 milliGRAM(s) Oral daily  sodium chloride 0.9%. 1000 milliLiter(s) IV Continuous <Continuous>    	  TELEMETRY:   Vitals:  T(C): 36.2 (06-30-22 @ 05:28), Max: 36.8 (06-29-22 @ 20:21)  HR: 63 (06-30-22 @ 05:28) (63 - 94)  BP: 159/84 (06-30-22 @ 05:28) (134/67 - 167/76)  RR: 19 (06-30-22 @ 05:28) (16 - 20)  SpO2: 97% (06-30-22 @ 05:28) (94% - 98%)  Wt(kg): --  I&O's Summary    Height (cm): 175.3 (06-29 @ 23:42)  Weight (kg): 96.615 (06-29 @ 23:42)  BMI (kg/m2): 31.4 (06-29 @ 23:42)  BSA (m2): 2.12 (06-29 @ 23:42)  Daily T(C): 36.2 (06-30-22 @ 05:28), Max: 36.8 (06-29-22 @ 20:21)  HR: 63 (06-30-22 @ 05:28) (63 - 94)  BP: 159/84 (06-30-22 @ 05:28) (134/67 - 167/76)  RR: 19 (06-30-22 @ 05:28) (16 - 20)  SpO2: 97% (06-30-22 @ 05:28) (94% - 98%)  Wt(kg): --    Daily Height (cm): 175.3 (06-29 @ 23:42)  Weight (kg): 96.615 (06-29 @ 23:42)  BMI (kg/m2): 31.4 (06-29 @ 23:42)  BSA (m2): 2.12 (06-29 @ 23:42)    PHYSICAL EXAM:  Appearance: Normal	  HEENT:   Normal oral mucosa, PERRL, EOMI	  Lymphatic: No lymphadenopathy  Cardiovascular: Normal S1 S2, No JVD, No murmurs, No edema  Respiratory: Lungs clear to auscultation	  Psychiatry: A & O x 3, Mood & affect appropriate  Gastrointestinal:  Soft, Non-tender, + BS	  Skin: No rashes, No ecchymoses, No cyanosis  Neurologic: Non-focal  Extremities: Normal range of motion, No clubbing, cyanosis or edema  Vascular: Peripheral pulses palpable 2+ bilaterally, warm      ECG (tracing reviewed by me):  	    DIAGNOSTIC TESTING:  Echocardiogram: < from: TTE Echo Complete w/ Contrast w/ Doppler (06.29.22 @ 15:10) >    Summary:   1. Left ventricular ejection fraction, by visual estimation, is 50 to   55%.   2. Technically adequate study.   3. Low normal global left ventricular systolic function.   4. Entire anteriorseptum, apical inferior segment, and apex are   abnormal as described above.   5. Spectral Doppler shows impaired relaxation pattern of left   ventricular myocardial filling (Grade I diastolic dysfunction).   6. Normal left atrial size.   7. Mild mitral annular calcification.   8. Mild thickening of the anterior and posterior mitral valve leaflets.   9. Trace mitral valve regurgitation.  10. Mild to moderate aortic regurgitation.    MD Tyler Electronically signed on 6/30/2022 at 9:47:08AM      < end of copied text >      OTHER: 	    LABS:	 	  CARDIAC MARKERS:                          14.1   9.07  )-----------( 197      ( 30 Jun 2022 06:36 )             41.4     06-30    142  |  104  |  20.0  ----------------------------<  91  4.1   |  26.0  |  1.60<H>    Ca    8.5<L>      30 Jun 2022 06:36  Mg     1.8     06-30    TPro  6.3<L>  /  Alb  3.8  /  TBili  0.6  /  DBili  x   /  AST  21  /  ALT  21  /  AlkPhos  76  06-30    BNP: Serum Pro-Brain Natriuretic Peptide: 196 pg/mL (06-29 @ 12:09)

## 2022-06-30 NOTE — PROGRESS NOTE ADULT - SUBJECTIVE AND OBJECTIVE BOX
Chief Complaint:  unstable angina    SUBJECTIVE / OVERNIGHT EVENTS:  No acute events reported overnight.  pt offers no acute complaints. Patient denies chest pain, SOB, abd pain, N/V, fever, chills, dysuria or any other complaints. All remainder ROS negative.       I&O's Summary        PHYSICAL EXAM:  Vital Signs Last 24 Hrs  T(C): 36.2 (30 Jun 2022 05:28), Max: 36.8 (29 Jun 2022 20:21)  T(F): 97.2 (30 Jun 2022 05:28), Max: 98.3 (29 Jun 2022 20:21)  HR: 63 (30 Jun 2022 05:28) (63 - 94)  BP: 159/84 (30 Jun 2022 05:28) (146/76 - 167/76)  BP(mean): --  RR: 19 (30 Jun 2022 05:28) (18 - 19)  SpO2: 97% (30 Jun 2022 05:28) (95% - 98%)      GENERAL: pt examined bedside, laying comfortably in bed in NAD  HEENT: NC/AT, moist oral mucosa, clear conjunctiva, sclera nonicteric  RESPIRATORY: Normal respiratory effort, no wheezing, rhonchi, rales  CARDIOVASCULAR: RRR, normal S1 and S2  ABDOMEN: soft, NT/ND, normoactive bowel sounds, no rebound/guarding  EXTREMITIES: No cynaosis, no clubbing, no lower extremity edema, pulses are 2+ bilaterally  NEUROLOGY: A+O to person, place, and time, no focal neurologic deficits appreciated   SKIN: No rashes or no palpable lesions        LABS:                        14.1   9.07  )-----------( 197      ( 30 Jun 2022 06:36 )             41.4     06-30    142  |  104  |  20.0  ----------------------------<  91  4.1   |  26.0  |  1.60<H>    Ca    8.5<L>      30 Jun 2022 06:36  Mg     1.8     06-30    TPro  6.3<L>  /  Alb  3.8  /  TBili  0.6  /  DBili  x   /  AST  21  /  ALT  21  /  AlkPhos  76  06-30      CARDIAC MARKERS ( 29 Jun 2022 18:41 )  x     / <0.01 ng/mL / x     / x     / x      CARDIAC MARKERS ( 29 Jun 2022 12:09 )  x     / <0.01 ng/mL / x     / x     / x              CAPILLARY BLOOD GLUCOSE      POCT Blood Glucose.: 88 mg/dL (30 Jun 2022 05:22)        RADIOLOGY & ADDITIONAL TESTS:    < from: Xray Chest 1 View- PORTABLE-Urgent (06.29.22 @ 16:32) >  IMPRESSION:  1. Cardiomegaly with no acute cardiopulmonary abnormalities, including no   evidence of infiltrate, pleural effusion or CHF.    < end of copied text >      < from: CT Head No Cont (06.29.22 @ 12:53) >  IMPRESSION:    No CT evidence for acute intracranial hemorrhage, territorial infarction   or mass effect. If the patient has persistent symptoms and/or new focal   neurologic deficits, consider further evaluation with MRI.    < end of copied text >      < from: TTE Echo Complete w/ Contrast w/ Doppler (06.29.22 @ 15:10) >  Summary:   1. Left ventricular ejection fraction, by visual estimation, is 50 to   55%.   2. Technically adequate study.   3. Low normal global left ventricular systolic function.   4. Entire anteriorseptum, apical inferior segment, and apex are   abnormal as described above.   5. Spectral Doppler shows impaired relaxation pattern of left   ventricular myocardial filling (Grade I diastolic dysfunction).   6. Normal left atrial size.   7. Mild mitral annular calcification.   8. Mild thickening of the anterior and posterior mitral valve leaflets.   9. Trace mitral valve regurgitation.  10. Mild to moderate aortic regurgitation.    < end of copied text >        MEDICATIONS  (STANDING):  amLODIPine   Tablet 5 milliGRAM(s) Oral daily  aspirin  chewable 81 milliGRAM(s) Oral daily  atorvastatin 10 milliGRAM(s) Oral at bedtime  losartan 100 milliGRAM(s) Oral daily  metoprolol succinate ER 50 milliGRAM(s) Oral daily  sodium chloride 0.9%. 1000 milliLiter(s) (50 mL/Hr) IV Continuous <Continuous>  tamsulosin 0.4 milliGRAM(s) Oral daily    MEDICATIONS  (PRN):

## 2022-06-30 NOTE — CHART NOTE - NSCHARTNOTEFT_GEN_A_CORE
Pt not in his room-down in Nuclear Medicine for NST -Tele-reveals NSR 60-70s Will f/u post procedure

## 2022-07-01 ENCOUNTER — TRANSCRIPTION ENCOUNTER (OUTPATIENT)
Age: 72
End: 2022-07-01

## 2022-07-01 VITALS
HEART RATE: 77 BPM | OXYGEN SATURATION: 98 % | RESPIRATION RATE: 20 BRPM | SYSTOLIC BLOOD PRESSURE: 144 MMHG | DIASTOLIC BLOOD PRESSURE: 67 MMHG

## 2022-07-01 PROCEDURE — 80053 COMPREHEN METABOLIC PANEL: CPT

## 2022-07-01 PROCEDURE — 86803 HEPATITIS C AB TEST: CPT

## 2022-07-01 PROCEDURE — U0003: CPT

## 2022-07-01 PROCEDURE — 83880 ASSAY OF NATRIURETIC PEPTIDE: CPT

## 2022-07-01 PROCEDURE — C1887: CPT

## 2022-07-01 PROCEDURE — 80061 LIPID PANEL: CPT

## 2022-07-01 PROCEDURE — 99239 HOSP IP/OBS DSCHRG MGMT >30: CPT

## 2022-07-01 PROCEDURE — 82962 GLUCOSE BLOOD TEST: CPT

## 2022-07-01 PROCEDURE — 78452 HT MUSCLE IMAGE SPECT MULT: CPT

## 2022-07-01 PROCEDURE — 70450 CT HEAD/BRAIN W/O DYE: CPT | Mod: MA

## 2022-07-01 PROCEDURE — 99285 EMERGENCY DEPT VISIT HI MDM: CPT | Mod: 25

## 2022-07-01 PROCEDURE — 83735 ASSAY OF MAGNESIUM: CPT

## 2022-07-01 PROCEDURE — 84443 ASSAY THYROID STIM HORMONE: CPT

## 2022-07-01 PROCEDURE — C1769: CPT

## 2022-07-01 PROCEDURE — 85025 COMPLETE CBC W/AUTO DIFF WBC: CPT

## 2022-07-01 PROCEDURE — 93454 CORONARY ARTERY ANGIO S&I: CPT

## 2022-07-01 PROCEDURE — 71045 X-RAY EXAM CHEST 1 VIEW: CPT

## 2022-07-01 PROCEDURE — 84484 ASSAY OF TROPONIN QUANT: CPT

## 2022-07-01 PROCEDURE — A9500: CPT

## 2022-07-01 PROCEDURE — 83036 HEMOGLOBIN GLYCOSYLATED A1C: CPT

## 2022-07-01 PROCEDURE — C8929: CPT

## 2022-07-01 PROCEDURE — 36415 COLL VENOUS BLD VENIPUNCTURE: CPT

## 2022-07-01 PROCEDURE — 93017 CV STRESS TEST TRACING ONLY: CPT

## 2022-07-01 PROCEDURE — 93005 ELECTROCARDIOGRAM TRACING: CPT

## 2022-07-01 PROCEDURE — 93454 CORONARY ARTERY ANGIO S&I: CPT | Mod: 26

## 2022-07-01 PROCEDURE — 85027 COMPLETE CBC AUTOMATED: CPT

## 2022-07-01 PROCEDURE — 80048 BASIC METABOLIC PNL TOTAL CA: CPT

## 2022-07-01 PROCEDURE — 99152 MOD SED SAME PHYS/QHP 5/>YRS: CPT

## 2022-07-01 PROCEDURE — U0005: CPT

## 2022-07-01 RX ORDER — SODIUM CHLORIDE 9 MG/ML
250 INJECTION INTRAMUSCULAR; INTRAVENOUS; SUBCUTANEOUS ONCE
Refills: 0 | Status: COMPLETED | OUTPATIENT
Start: 2022-07-01 | End: 2022-07-01

## 2022-07-01 RX ADMIN — Medication 81 MILLIGRAM(S): at 12:48

## 2022-07-01 RX ADMIN — SODIUM CHLORIDE 250 MILLILITER(S): 9 INJECTION INTRAMUSCULAR; INTRAVENOUS; SUBCUTANEOUS at 16:59

## 2022-07-01 RX ADMIN — SODIUM CHLORIDE 250 MILLILITER(S): 9 INJECTION INTRAMUSCULAR; INTRAVENOUS; SUBCUTANEOUS at 13:45

## 2022-07-01 RX ADMIN — LOSARTAN POTASSIUM 100 MILLIGRAM(S): 100 TABLET, FILM COATED ORAL at 05:20

## 2022-07-01 RX ADMIN — Medication 50 MILLIGRAM(S): at 05:21

## 2022-07-01 RX ADMIN — HEPARIN SODIUM 5000 UNIT(S): 5000 INJECTION INTRAVENOUS; SUBCUTANEOUS at 05:20

## 2022-07-01 RX ADMIN — AMLODIPINE BESYLATE 5 MILLIGRAM(S): 2.5 TABLET ORAL at 05:21

## 2022-07-01 NOTE — PROGRESS NOTE ADULT - ASSESSMENT
73 y/o M w/ PMH CAD (pci x2 stents in 2010 after anterior wall MI), HTN, HLD, [place], CKD 3b came in c/o dyspnea and palpitations on exertion as well as presyncope.  Pt also had associated jaw pain and noted elevated BP.  Pt has had similar symptoms in the past and was placed on a holter monitor that captured NSVT during event.  Pt admitted for unstable angina.  while in the ED pt was again noted to have NSVT on monitor.  Pt was seen by cardiology and LHC was recommended but pt declined due to concern of his renal fxn.  Pt found to have abnormal stress test.  Pt remains acute as he continues to be symptomatic both at rest and w/ exertion.  Pt now agreeable for [facility] and scheduled for today.  Will continue monitoring on telemetry.       Unstable angina / NSVT   - NSVT on holter outpt and on monitor on admission   - Symptomatic w/ dyspnea, palpitations, near syncope and jaw pain on admission   - Abnormal nuclear stress test, reviewed and noted above   - Pt agreeable to [facility] and scheduled for this afternoon   - Troponins negative   - On ASA, statin, metoprolol    - Off plavix   - TTE w/ LVEF 50-55%  - Monitor on telemetry   - Maintain K>4 and Mg>2  - Cardiology and EP following and recs noted       HTN  - c/w antihypertensives       CKD IIIb  - Cr at baseline    - Monitor renal fxn   - Avoid nephrotoxic meds or renally dose as needed       VTE ppx: heparin sq    Dispo: Pt remains acute requiring inpt hospitalization given he continues to be symptomatic both at rest and w/ exertion.  Pt pending [facility] today.

## 2022-07-01 NOTE — PROGRESS NOTE ADULT - ASSESSMENT
Procedure: Left heart catheterization    1. S/P LHC:   ·	Remove radial band at: 4:30PM  ·	Wrist precautions explained.  ·	Medications: Continue current medications.    2. NSVT  ·	EP input appreciated, NSVT does not correlate with his symptoms.  ·	Will continue Toprol 50 mg daily  ·	Will follow up as an outpatient    Disposition:   ·	OK to discharge home from a Cardiology standpoint at 6:30PM   Procedure: Left heart catheterization    1. S/P LHC: Mild nonobstructive CAD  ·	Remove radial band at: 4:30PM  ·	Wrist precautions explained.  ·	Medications: Continue current medications.    2. NSVT  ·	EP input appreciated, NSVT does not correlate with his symptoms.  ·	Will continue Toprol 50 mg daily  ·	Will follow up as an outpatient    Disposition:   ·	OK to discharge home from a Cardiology standpoint at 6:30PM

## 2022-07-01 NOTE — DISCHARGE NOTE PROVIDER - NSDCCAREPROVSEEN_GEN_ALL_CORE_FT
Jamie, Arlen Chadwick, Deidra Riddle, Jessica Borden, Paige De Anda, Mike Richmond, Nandini Wright, Jacquelin Rudolph, Aramis Mack, Jozef Ferro, Renetta Lira, Whit Quiroz, Florian Rowland, Lynn Gray, Stephanie Clinton, Darwin Alejandra, Benjamin Whitehead, Jack Caban, Issac Bentley

## 2022-07-01 NOTE — DISCHARGE NOTE PROVIDER - NSDCMRMEDTOKEN_GEN_ALL_CORE_FT
amLODIPine 5 mg oral tablet: 1 tab(s) orally once a day  Aspir 81 81 mg oral tablet: 1 tab(s) orally twice a day  Lipitor 10 mg oral tablet: 1 tab(s) orally once a day  losartan 100 mg oral tablet: 1 tab(s) orally once a day in the morning  Metoprolol Succinate ER 50 mg oral tablet, extended release: 1 tab(s) orally once a day in evening  tamsulosin 0.4 mg oral capsule: 1 cap(s) orally once a day

## 2022-07-01 NOTE — DISCHARGE NOTE NURSING/CASE MANAGEMENT/SOCIAL WORK - PATIENT PORTAL LINK FT
You can access the FollowMyHealth Patient Portal offered by [facility] by registering at the following website: [email or web address]. By joining [employer]’s FollowMyHealth portal, you will also be able to view your health information using other applications (apps) compatible with our system.

## 2022-07-01 NOTE — DISCHARGE NOTE PROVIDER - NSDCCPTREATMENT_GEN_ALL_CORE_FT
PRINCIPAL PROCEDURE  Procedure: Left heart catheterization  Findings and Treatment:   LM   Left main artery: Angiography shows minor irregularities. ERIN Flow 3.  LAD   Left anterior descending artery: Angiography shows mild atherosclerosis. Patent stents. Mild disease at the S2 origin. . There is a 30 % stenosis. ERIN Flow 3.    CX   Circumflex: Angiography shows minor irregularities. ERIN Flow 3.    RCA   Right coronary artery: Angiography shows minor irregularities. ERIN Flow 3.

## 2022-07-01 NOTE — PROGRESS NOTE ADULT - SUBJECTIVE AND OBJECTIVE BOX
Department of Cardiology                                                                  [facility]/[place] [address] [id number]                                                            Telephone: [phone]. Fax:[phone]                                                                           Cardiac Catheterization Note       Subjective:  72y  Male who had a left heart catheterization which showed (official report pending):       LM: No significant CAD       LAD: No significant CAD       LCX: No significant CAD       RCA: No significant CAD         Access: Right radial artery       Hemostasis: Radial band       Total Contrast:        Total Heparin:        Antiplatelet Given:    PAST MEDICAL & SURGICAL HISTORY:  [place] (hard of hearing): Understands lip reading and loud, low volume speech. Does not know sign language.  MI (myocardial infarction): may 2010 stents x 2  Hypertension  TIA (transient ischemic attack)  Hypertension  BPH (benign prostatic hyperplasia)  COVID-19 vaccine series completed: Moderna x 2  Stage 3 chronic kidney disease  Coronary artery disease involving native coronary artery of native heart with unstable angina pector  Mixed hyperlipidemia  NSVT (nonsustained ventricular tachycardia)  S/P umbilical hernia repair, follow-up exam  S/P appendectomy  S/P hernia repair: bilateral hernia of groin  History of ankle surgery: left ankle pin  History of hemorrhoids: procedure for hemorrhoids in past  Status post insertion of drug-eluting stent into left anterior descending (LAD) artery for coronary  History of loop recorder  History of tonsillectomy  S/P excision of lipoma    FAMILY HISTORY:  FH: heart disease (Father)  FH: prostate cancer (Father)  FH: pancreatic cancer (Mother)  FH: kidney disease (Father): ESRD, was on dialysis  FH: glaucoma (Father)  FH: glaucoma (Sibling)  Family history of coronary artery bypass surgery (Father)  Family history of diabetes mellitus (Mother)  Family history of malignant neoplasm of ovary (Sibling)    Home Medications:  amLODIPine 5 mg oral tablet: 1 tab(s) orally once a day (29 Jun 2022 14:07)  Aspir 81 81 mg oral tablet: 1 tab(s) orally twice a day (29 Jun 2022 14:07)  Lipitor 10 mg oral tablet: 1 tab(s) orally once a day (29 Jun 2022 14:07)  losartan 100 mg oral tablet: 1 tab(s) orally once a day in the morning (29 Jun 2022 14:07)  Metoprolol Succinate ER 50 mg oral tablet, extended release: 1 tab(s) orally once a day in evening (29 Jun 2022 14:07)  tamsulosin 0.4 mg oral capsule: 1 cap(s) orally once a day (29 Jun 2022 14:07)    Patient is a 72y old  Male who presents with a chief complaint of unstable angina (01 Jul 2022 11:15)    HEALTH ISSUES - PROBLEM Dx:  Coronary artery disease involving native coronary artery of native heart with unstable angina pector  NSVT (nonsustained ventricular tachycardia)  Syncope, near  Mixed hyperlipidemia  Primary hypertension  Stenosis of left carotid artery    HPI: 73y/o male never smoker, [place], with history of CAD, MI, prior PCI in 5/2010 (3.5 X 23 and 3.5 X 8 Promus ADRIAN's in the LAD, carotid artery disease, NSVT, stager 3 CKD (follows with Dr. Semaj Burnette), HTN, and HLD who was in a "wellness center" having his arm massaged when he a sudden onset of dizziness/lightheadedness with diaphoresis and jaw pain. He states he thought he was going to pass out. He states he had 2 prior episodes on Thursday and Sunday. He had a recent heart monitor which showed 2 episodes of VT (25 beats at 165 bpm, and 4 beats at 167 bpm) and multiple episodes of SVT. Earlier today he had a run of 7 beats of NSVT. He admits to fatigue, occasional palpitations, and weakness.    General: No fatigue, no fevers/chills  Respiratory: No dyspnea, no cough, no wheeze  CV: No chest pain, no palpitations  Abd: No nausea  Neuro: No headache, no dizziness    No Known Allergies    Objective:  Vital Signs Last 24 Hrs  T(C): 36.9 (01 Jul 2022 13:34), Max: 37 (30 Jun 2022 21:39)  T(F): 98.4 (01 Jul 2022 13:34), Max: 98.6 (30 Jun 2022 21:39)  HR: 18 (01 Jul 2022 13:34) (18 - 74)  BP: 173/73 (01 Jul 2022 13:34) (145/76 - 173/73)    RR: 18 (01 Jul 2022 13:34) (18 - 19)  SpO2: 98% (01 Jul 2022 13:34) (94% - 98%)    CM: SR  Neuro: A&OX3, CN 2-12 intact  HEENT: NC, AT  Lungs: CTA B/L  CV: S1, S2, no murmur, RRR  Abd: Soft  Extremity: Right radial band: no bleeding, fingers warm with good cap refil                          14.1   9.07  )-----------( 197      ( 30 Jun 2022 06:36 )             41.4     06-30    142  |  104  |  20.0  ----------------------------<  91  4.1   |  26.0  |  1.60    Ca    8.5      30 Jun 2022 06:36  Mg     1.8     06-30    TPro  6.3  /  Alb  3.8  /  TBili  0.6  /  DBili  x   /  AST  21  /  ALT  21  /  AlkPhos  76  06-30                                                                                Department of Cardiology                                                                  [facility]/[place] [address] [id number]                                                            Telephone: [phone]. Fax:[phone]                                                                           Cardiac Catheterization Note       Subjective:  72y  Male who had a left heart catheterization which showed:  LM   ·	Left main artery: Angiography shows minor irregularities. ERIN Flow 3.  LAD   ·	Left anterior descending artery: Angiography shows mild atherosclerosis. Patent stents. Mild disease at the S2 origin. . There is a 30 % stenosis. ERIN Flow 3.    CX   ·	Circumflex: Angiography shows minor irregularities. ERIN Flow 3.    RCA   ·	Right coronary artery: Angiography shows minor irregularities. ERIN Flow 3.         Access: Right radial artery       Hemostasis: Radial band       Total Contrast: 50 mL Omnipaque       Total Heparin: N/A       Antiplatelet Given: N/A    PAST MEDICAL & SURGICAL HISTORY:  [place] (hard of hearing): Understands lip reading and loud, low volume speech. Does not know sign language.  MI (myocardial infarction): may 2010 stents x 2  Hypertension  TIA (transient ischemic attack)  Hypertension  BPH (benign prostatic hyperplasia)  COVID-19 vaccine series completed: Moderna x 2  Stage 3 chronic kidney disease  Coronary artery disease involving native coronary artery of native heart with unstable angina pector  Mixed hyperlipidemia  NSVT (nonsustained ventricular tachycardia)  S/P umbilical hernia repair, follow-up exam  S/P appendectomy  S/P hernia repair: bilateral hernia of groin  History of ankle surgery: left ankle pin  History of hemorrhoids: procedure for hemorrhoids in past  Status post insertion of drug-eluting stent into left anterior descending (LAD) artery for coronary  History of loop recorder  History of tonsillectomy  S/P excision of lipoma    FAMILY HISTORY:  FH: heart disease (Father)  FH: prostate cancer (Father)  FH: pancreatic cancer (Mother)  FH: kidney disease (Father): ESRD, was on dialysis  FH: glaucoma (Father)  FH: glaucoma (Sibling)  Family history of coronary artery bypass surgery (Father)  Family history of diabetes mellitus (Mother)  Family history of malignant neoplasm of ovary (Sibling)    Home Medications:  amLODIPine 5 mg oral tablet: 1 tab(s) orally once a day (29 Jun 2022 14:07)  Aspir 81 81 mg oral tablet: 1 tab(s) orally twice a day (29 Jun 2022 14:07)  Lipitor 10 mg oral tablet: 1 tab(s) orally once a day (29 Jun 2022 14:07)  losartan 100 mg oral tablet: 1 tab(s) orally once a day in the morning (29 Jun 2022 14:07)  Metoprolol Succinate ER 50 mg oral tablet, extended release: 1 tab(s) orally once a day in evening (29 Jun 2022 14:07)  tamsulosin 0.4 mg oral capsule: 1 cap(s) orally once a day (29 Jun 2022 14:07)    Patient is a 72y old  Male who presents with a chief complaint of unstable angina (01 Jul 2022 11:15)    HEALTH ISSUES - PROBLEM Dx:  Coronary artery disease involving native coronary artery of native heart with unstable angina pector  NSVT (nonsustained ventricular tachycardia)  Syncope, near  Mixed hyperlipidemia  Primary hypertension  Stenosis of left carotid artery    HPI: 73y/o male never smoker, [place], with history of CAD, MI, prior PCI in 5/2010 (3.5 X 23 and 3.5 X 8 Promus ADRIAN's in the LAD, carotid artery disease, NSVT, stager 3 CKD (follows with Dr. Semaj Burnette), HTN, and HLD who was in a "wellness center" having his arm massaged when he a sudden onset of dizziness/lightheadedness with diaphoresis and jaw pain. He states he thought he was going to pass out. He states he had 2 prior episodes on Thursday and Sunday. He had a recent heart monitor which showed 2 episodes of VT (25 beats at 165 bpm, and 4 beats at 167 bpm) and multiple episodes of SVT. Earlier today he had a run of 7 beats of NSVT. He admits to fatigue, occasional palpitations, and weakness.    General: No fatigue, no fevers/chills  Respiratory: No dyspnea, no cough, no wheeze  CV: No chest pain, no palpitations  Abd: No nausea  Neuro: No headache, no dizziness    No Known Allergies    Objective:  Vital Signs Last 24 Hrs  T(C): 36.9 (01 Jul 2022 13:34), Max: 37 (30 Jun 2022 21:39)  T(F): 98.4 (01 Jul 2022 13:34), Max: 98.6 (30 Jun 2022 21:39)  HR: 18 (01 Jul 2022 13:34) (18 - 74)  BP: 173/73 (01 Jul 2022 13:34) (145/76 - 173/73)    RR: 18 (01 Jul 2022 13:34) (18 - 19)  SpO2: 98% (01 Jul 2022 13:34) (94% - 98%)    CM: SR  Neuro: A&OX3, CN 2-12 intact  HEENT: NC, AT  Lungs: CTA B/L  CV: S1, S2, no murmur, RRR  Abd: Soft  Extremity: Right radial band: no bleeding, fingers warm with good cap refil                          14.1   9.07  )-----------( 197      ( 30 Jun 2022 06:36 )             41.4     06-30    142  |  104  |  20.0  ----------------------------<  91  4.1   |  26.0  |  1.60    Ca    8.5      30 Jun 2022 06:36  Mg     1.8     06-30    TPro  6.3  /  Alb  3.8  /  TBili  0.6  /  DBili  x   /  AST  21  /  ALT  21  /  AlkPhos  76  06-30

## 2022-07-01 NOTE — DISCHARGE NOTE PROVIDER - ATTENDING DISCHARGE PHYSICAL EXAMINATION:
Vital Signs Last 24 Hrs  T(C): 36.9 (01 Jul 2022 13:34), Max: 37 (30 Jun 2022 21:39)  T(F): 98.4 (01 Jul 2022 13:34), Max: 98.6 (30 Jun 2022 21:39)  HR: 72 (01 Jul 2022 16:30) (65 - 74)  BP: 141/77 (01 Jul 2022 16:30) (138/65 - 173/73)  BP(mean): --  RR: 18 (01 Jul 2022 16:30) (16 - 19)  SpO2: 95% (01 Jul 2022 16:30) (94% - 98%)    GENERAL: pt examined bedside, laying comfortably in bed in NAD  HEENT: NC/AT, moist oral mucosa, clear conjunctiva, sclera nonicteric  RESPIRATORY: Normal respiratory effort, no wheezing, rhonchi, rales  CARDIOVASCULAR: RRR, normal S1 and S2  ABDOMEN: soft, NT/ND, normoactive bowel sounds, no rebound/guarding  EXTREMITIES: No cynaosis, no clubbing, no lower extremity edema, pulses are 2+ bilaterally  NEUROLOGY: A+O to person, place, and time, no focal neurologic deficits appreciated   SKIN: No rashes or no palpable lesions

## 2022-07-01 NOTE — PROGRESS NOTE ADULT - ASSESSMENT
71y/o male never smoker, [facility], with history of CAD, MI, prior PCI in 5/2010 (3.5 X 23 and 3.5 X 8 Promus ADRIAN's in the LAD, carotid artery disease, NSVT, stager 3 CKD (follows with Dr. Semaj Burnette), HTN, and HLD who was in a "wellness center" having his arm massaged when he a sudden onset of dizziness/lightheadedness with diaphoresis and jaw pain. He states he thought he was going to pass out. He states he had 2 prior episodes on Thursday and Sunday. He had a recent heart monitor which showed 2 episodes of VT (25 beats at 165 bpm, and 4 beats at 167 bpm) and multiple episodes of SVT. Now awaiting nuclear stress test.

## 2022-07-01 NOTE — DISCHARGE NOTE PROVIDER - CARE PROVIDER_API CALL
Florian Quiroz)  Cardiovascular Disease  [address] [facility], [address] [place] [id number]  Phone: [phone]  Fax: [phone]  Established Patient  Follow Up Time: 2 weeks

## 2022-07-01 NOTE — DISCHARGE NOTE PROVIDER - HOSPITAL COURSE
71y/o male never smoker, [facility], with history of CAD, MI, prior PCI in 5/2010 (3.5 X 23 and 3.5 X 8 Promus ADRIAN's in the LAD, carotid artery disease, NSVT, stager 3 CKD (follows with Dr. Semaj Burnette), HTN, and HLD who was in a "wellness center" having his arm massaged when he a sudden onset of dizziness/lightheadedness with diaphoresis and jaw pain. He states he thought he was going to pass out. He states he had 2 prior episodes on Thursday and Sunday. He had a recent heart monitor which showed 2 episodes of VT (25 beats at 165 bpm, and 4 beats at 167 bpm) and multiple episodes of SVT. Earlier today he had a run of 7 beats of NSVT. He admits to fatigue, occasional palpitations, and weakness. He had an echo which showed normal LVSF, an nuclear stress test which showed apical, distal anterior and gqw-ez-jszegg inferior wall infarct, and a [facility] which showed 71y/o male never smoker, [place], with history of CAD, MI, prior PCI in 5/2010 (3.5 X 23 and 3.5 X 8 Promus ADRIAN's in the LAD, carotid artery disease, NSVT, stager 3 CKD (follows with Dr. Semaj Burnette), HTN, and HLD who was in a "wellness center" having his arm massaged when he a sudden onset of dizziness/lightheadedness with diaphoresis and jaw pain. He states he thought he was going to pass out. He states he had 2 prior episodes on Thursday and Sunday. He had a recent heart monitor which showed 2 episodes of VT (25 beats at 165 bpm, and 4 beats at 167 bpm) and multiple episodes of SVT. Earlier today he had a run of 7 beats of NSVT. He admits to fatigue, occasional palpitations, and weakness. He had an echo which showed normal LVSF, an nuclear stress test which showed apical, distal anterior and utx-cq-omgwri inferior wall infarct.   Pt underwent LHC but no intervention was needed.  Pt is medically stble for d/c home w/ out pt follow up with cardiology.

## 2022-07-01 NOTE — PROGRESS NOTE ADULT - PROBLEM SELECTOR PLAN 1
Patient has history of prior PCI of LAD in 2010.  Patient does not want LHC at this time secondary to CKD.   Stress test is positive showing medium sized fixed defects in apical, distal anterior, and mid to distal inferior walls that are fixed.   pt is agreeable for cath after discussion with medicine attending   will discuss w/ Dr. Quiroz, if agreeable will plan for LHC today   Aspirin 81 mg daily.  Continue metoprolol 50 mg BID.  Continue Lipitor 10 mg daily. Patient has history of prior PCI of LAD in 2010.  Patient does not want LHC at this time secondary to CKD.   Stress test is positive showing medium sized fixed defects in apical, distal anterior, and mid to distal inferior walls that are fixed.   pt is agreeable for cath   will plan for LHC today this afternoon w/ Josefa MARTINS   Aspirin 81 mg daily.  Continue metoprolol 50 mg BID.  Continue Lipitor 10 mg daily.

## 2022-07-01 NOTE — PROGRESS NOTE ADULT - SUBJECTIVE AND OBJECTIVE BOX
Chief Complaint:  unstable angina    SUBJECTIVE / OVERNIGHT EVENTS:  No acute events reported overnight.  pt offers no acute complaints. Patient denies chest pain, SOB, abd pain, N/V, fever, chills, dysuria or any other complaints. All remainder ROS negative.       I&O's Summary        PHYSICAL EXAM:  Vital Signs Last 24 Hrs  T(C): 36.7 (01 Jul 2022 11:07), Max: 37 (30 Jun 2022 21:39)  T(F): 98.1 (01 Jul 2022 11:07), Max: 98.6 (30 Jun 2022 21:39)  HR: 67 (01 Jul 2022 11:07) (65 - 74)  BP: 145/76 (01 Jul 2022 11:07) (145/76 - 166/66)  BP(mean): --  RR: 18 (01 Jul 2022 11:07) (18 - 19)  SpO2: 95% (01 Jul 2022 11:07) (94% - 97%)      GENERAL: pt examined bedside, laying comfortably in bed in NAD  HEENT: NC/AT, moist oral mucosa, clear conjunctiva, sclera nonicteric  RESPIRATORY: Normal respiratory effort, no wheezing, rhonchi, rales  CARDIOVASCULAR: RRR, normal S1 and S2  ABDOMEN: soft, NT/ND, normoactive bowel sounds, no rebound/guarding  EXTREMITIES: No cynaosis, no clubbing, no lower extremity edema, pulses are 2+ bilaterally  NEUROLOGY: A+O to person, place, and time, no focal neurologic deficits appreciated   SKIN: No rashes or no palpable lesions        LABS:                                            14.1   9.07  )-----------( 197      ( 30 Jun 2022 06:36 )             41.4       06-30    142  |  104  |  20.0  ----------------------------<  91  4.1   |  26.0  |  1.60<H>    Ca    8.5<L>      30 Jun 2022 06:36  Mg     1.8     06-30    TPro  6.3<L>  /  Alb  3.8  /  TBili  0.6  /  DBili  x   /  AST  21  /  ALT  21  /  AlkPhos  76  06-30        CAPILLARY BLOOD GLUCOSE      POCT Blood Glucose.: 88 mg/dL (30 Jun 2022 05:22)        RADIOLOGY & ADDITIONAL TESTS:    < from: Xray Chest 1 View- PORTABLE-Urgent (06.29.22 @ 16:32) >  IMPRESSION:  1. Cardiomegaly with no acute cardiopulmonary abnormalities, including no   evidence of infiltrate, pleural effusion or CHF.    < end of copied text >      < from: CT Head No Cont (06.29.22 @ 12:53) >  IMPRESSION:    No CT evidence for acute intracranial hemorrhage, territorial infarction   or mass effect. If the patient has persistent symptoms and/or new focal   neurologic deficits, consider further evaluation with MRI.    < end of copied text >      < from: TTE Echo Complete w/ Contrast w/ Doppler (06.29.22 @ 15:10) >  Summary:   1. Left ventricular ejection fraction, by visual estimation, is 50 to   55%.   2. Technically adequate study.   3. Low normal global left ventricular systolic function.   4. Entire anteriorseptum, apical inferior segment, and apex are   abnormal as described above.   5. Spectral Doppler shows impaired relaxation pattern of left   ventricular myocardial filling (Grade I diastolic dysfunction).   6. Normal left atrial size.   7. Mild mitral annular calcification.   8. Mild thickening of the anterior and posterior mitral valve leaflets.   9. Trace mitral valve regurgitation.  10. Mild to moderate aortic regurgitation.    < end of copied text >      < from: Nuclear Stress Test-Pharmacologic (06.30.22 @ 08:27) >  IMPRESSIONS:  * Myocardial Perfusion SPECT results are abnormal.  * There are medium sized, severe defects in apical, distal  anterior and ypa-pi-gxodrg inferior walls that are fixed,  defects persist on prone imaging, with reducedsystolic  thickening/motion, consistent with infarcts, no clear  evidence of ischemia.  * Post-stress resting myocardial perfusion gated SPECT  imaging was performed (LVEF = 46 %;LVEDV = 109 ml.),  hypokinesis of the apex and distal anterior, distal  inferior walls.      < end of copied text >      MEDICATIONS  (STANDING):  amLODIPine   Tablet 5 milliGRAM(s) Oral daily  aspirin  chewable 81 milliGRAM(s) Oral daily  atorvastatin 10 milliGRAM(s) Oral at bedtime  losartan 100 milliGRAM(s) Oral daily  metoprolol succinate ER 50 milliGRAM(s) Oral daily  sodium chloride 0.9%. 1000 milliLiter(s) (50 mL/Hr) IV Continuous <Continuous>  tamsulosin 0.4 milliGRAM(s) Oral daily    MEDICATIONS  (PRN):

## 2022-07-01 NOTE — PROGRESS NOTE ADULT - SUBJECTIVE AND OBJECTIVE BOX
[facility]                                                         CARDIOLOGY AT [facility]                                                                  [address] [facility], [place] [address]                                                         Telephone: [phone]. Fax:[phone]                                                                             PROGRESS NOTE    Review of symptoms:   Cardiac:  No chest pain. No dyspnea. No palpitations.  Respiratory: no cough. No dyspnea  Gastrointestinal: No diarrhea. No abdominal pain. No bleeding.   Neuro: No focal neuro complaints.    Vitals:  T(C): 36.6 (07-01-22 @ 04:40), Max: 37 (06-30-22 @ 21:39)  HR: 65 (07-01-22 @ 04:40) (65 - 74)  BP: 157/72 (07-01-22 @ 04:40) (148/73 - 166/66)  RR: 18 (07-01-22 @ 04:40) (18 - 19)  SpO2: 97% (07-01-22 @ 04:40) (94% - 97%)  Wt(kg): --  I&O's Summary    Weight (kg): 96.615 (06-29 @ 23:42)    PHYSICAL EXAM:  Appearance: Comfortable. No acute distress  HEENT:  Atraumatic. Normocephalic.  Normal oral mucosa  Neurologic: A & O x 3, no gross focal deficits.  Cardiovascular: RRR S1 S2, No murmur, no rubs/gallops. No JVD  Respiratory: Lungs clear to auscultation, unlabored   Gastrointestinal:  Soft, Non-tender, + BS  Lower Extremities: 2+ Peripheral Pulses, No clubbing, cyanosis, or edema  Psychiatry: Patient is calm. No agitation.   Skin: warm and dry.    CURRENT CARDIAC MEDICATIONS:  amLODIPine   Tablet 5 milliGRAM(s) Oral daily  losartan 100 milliGRAM(s) Oral daily  metoprolol succinate ER 50 milliGRAM(s) Oral daily  tamsulosin 0.4 milliGRAM(s) Oral daily      CURRENT OTHER MEDICATIONS:  atorvastatin 10 milliGRAM(s) Oral at bedtime  aspirin  chewable 81 milliGRAM(s) Oral daily  heparin   Injectable 5000 Unit(s) SubCutaneous every 8 hours  sodium chloride 0.9%. 1000 milliLiter(s) (50 mL/Hr) IV Continuous <Continuous>      LABS:	 	  ( 29 Jun 2022 18:41 )  Troponin T  <0.01,  CPK  X    , CKMB  X    , BNP X        , ( 29 Jun 2022 12:09 )  Troponin T  <0.01,  CPK  X    , CKMB  X    ,                                 14.1   9.07  )-----------( 197      ( 30 Jun 2022 06:36 )             41.4     06-30    142  |  104  |  20.0  ----------------------------<  91  4.1   |  26.0  |  1.60<H>    Ca    8.5<L>      30 Jun 2022 06:36  Mg     1.8     06-30    TPro  6.3<L>  /  Alb  3.8  /  TBili  0.6  /  DBili  x   /  AST  21  /  ALT  21  /  AlkPhos  76  06-30      Lipid Profile: Date: 06-30 @ 06:35  Total cholesterol 138; Direct LDL: --; HDL: 33; Triglycerides:150    HgA1c:   TSH: Thyroid Stimulating Hormone, Serum: 1.83 uIU/mL

## 2022-07-01 NOTE — DISCHARGE NOTE PROVIDER - NSDCCPCAREPLAN_GEN_ALL_CORE_FT
PRINCIPAL DISCHARGE DIAGNOSIS  Diagnosis: NSVT (nonsustained ventricular tachycardia)  Assessment and Plan of Treatment:   EP input appreciated, NSVT does not correlate with his symptoms.  Will continue Toprol 50 mg daily  Will follow up as an outpatient      SECONDARY DISCHARGE DIAGNOSES  Diagnosis: Coronary artery disease involving native coronary artery of native heart without angina pectoris  Assessment and Plan of Treatment:   GDMT:        APT: Aspirin 81 mg daily       Statin: Lipitor 10 mg daily       Beta Blocker: Toprol 50 mg daily       Other Antianginals: Amlodipine 5 mg daily       Aggressive cardiovascular risk reduction and lifestyle modification.    Diagnosis: Stage 3a chronic kidney disease  Assessment and Plan of Treatment:   Stage: 3  GFR: 46  Pre and post procedure hydration with NS IV given  BMP in 3 days with results to go to PMD.    Diagnosis: Primary hypertension  Assessment and Plan of Treatment:   BP Range Last 24 Hours: 145-166/66-76  Beta Blocker: Toprol 50 mg daily  RAASi: Losartan 100 mg daily  CCB: Amlodipine 5 mg daily  Other: N/A

## 2022-07-05 ENCOUNTER — APPOINTMENT (OUTPATIENT)
Dept: CARDIOLOGY | Facility: CLINIC | Age: 72
End: 2022-07-05

## 2022-07-06 ENCOUNTER — OUTPATIENT (OUTPATIENT)
Dept: OUTPATIENT SERVICES | Facility: HOSPITAL | Age: 72
LOS: 1 days | End: 2022-07-06
Payer: COMMERCIAL

## 2022-07-06 ENCOUNTER — RESULT REVIEW (OUTPATIENT)
Age: 72
End: 2022-07-06

## 2022-07-06 ENCOUNTER — APPOINTMENT (OUTPATIENT)
Dept: ULTRASOUND IMAGING | Facility: CLINIC | Age: 72
End: 2022-07-06

## 2022-07-06 DIAGNOSIS — Z95.5 PRESENCE OF CORONARY ANGIOPLASTY IMPLANT AND GRAFT: Chronic | ICD-10-CM

## 2022-07-06 DIAGNOSIS — Z98.89 OTHER SPECIFIED POSTPROCEDURAL STATES: Chronic | ICD-10-CM

## 2022-07-06 DIAGNOSIS — M79.603 PAIN IN ARM, UNSPECIFIED: ICD-10-CM

## 2022-07-06 DIAGNOSIS — M79.601 PAIN IN RIGHT ARM: ICD-10-CM

## 2022-07-06 DIAGNOSIS — Z09 ENCOUNTER FOR FOLLOW-UP EXAMINATION AFTER COMPLETED TREATMENT FOR CONDITIONS OTHER THAN MALIGNANT NEOPLASM: ICD-10-CM

## 2022-07-06 DIAGNOSIS — Z90.89 ACQUIRED ABSENCE OF OTHER ORGANS: Chronic | ICD-10-CM

## 2022-07-06 DIAGNOSIS — Z98.890 OTHER SPECIFIED POSTPROCEDURAL STATES: Chronic | ICD-10-CM

## 2022-07-06 DIAGNOSIS — Z87.19 PERSONAL HISTORY OF OTHER DISEASES OF THE DIGESTIVE SYSTEM: Chronic | ICD-10-CM

## 2022-07-06 DIAGNOSIS — Z09 ENCOUNTER FOR FOLLOW-UP EXAMINATION AFTER COMPLETED TREATMENT FOR CONDITIONS OTHER THAN MALIGNANT NEOPLASM: Chronic | ICD-10-CM

## 2022-07-06 PROCEDURE — 93931 UPPER EXTREMITY STUDY: CPT

## 2022-07-06 PROCEDURE — 93931 UPPER EXTREMITY STUDY: CPT | Mod: 26,RT

## 2022-07-07 PROBLEM — E78.2 MIXED HYPERLIPIDEMIA: Chronic | Status: ACTIVE | Noted: 2022-06-29

## 2022-07-07 PROBLEM — I25.110 ATHEROSCLEROTIC HEART DISEASE OF NATIVE CORONARY ARTERY WITH UNSTABLE ANGINA PECTORIS: Chronic | Status: ACTIVE | Noted: 2022-06-29

## 2022-07-07 PROBLEM — I47.2 VENTRICULAR TACHYCARDIA: Chronic | Status: ACTIVE | Noted: 2022-06-29

## 2022-07-08 ENCOUNTER — NON-APPOINTMENT (OUTPATIENT)
Age: 72
End: 2022-07-08

## 2022-07-11 ENCOUNTER — NON-APPOINTMENT (OUTPATIENT)
Age: 72
End: 2022-07-11

## 2022-08-03 ENCOUNTER — APPOINTMENT (OUTPATIENT)
Dept: CARDIOLOGY | Facility: CLINIC | Age: 72
End: 2022-08-03

## 2022-08-03 VITALS
OXYGEN SATURATION: 96 % | WEIGHT: 216 LBS | TEMPERATURE: 98.1 F | DIASTOLIC BLOOD PRESSURE: 72 MMHG | HEIGHT: 69 IN | BODY MASS INDEX: 31.99 KG/M2 | SYSTOLIC BLOOD PRESSURE: 160 MMHG | HEART RATE: 75 BPM

## 2022-08-03 DIAGNOSIS — Z09 ENCOUNTER FOR FOLLOW-UP EXAMINATION AFTER COMPLETED TREATMENT FOR CONDITIONS OTHER THAN MALIGNANT NEOPLASM: ICD-10-CM

## 2022-08-03 PROCEDURE — 99214 OFFICE O/P EST MOD 30 MIN: CPT

## 2022-08-03 RX ORDER — ASPIRIN 81 MG
81 TABLET, DELAYED RELEASE (ENTERIC COATED) ORAL DAILY
Refills: 0 | Status: ACTIVE | COMMUNITY

## 2022-08-29 ENCOUNTER — APPOINTMENT (OUTPATIENT)
Dept: ELECTROPHYSIOLOGY | Facility: CLINIC | Age: 72
End: 2022-08-29

## 2022-08-29 VITALS
BODY MASS INDEX: 31.7 KG/M2 | OXYGEN SATURATION: 98 % | DIASTOLIC BLOOD PRESSURE: 66 MMHG | SYSTOLIC BLOOD PRESSURE: 138 MMHG | HEART RATE: 82 BPM | TEMPERATURE: 97.9 F | HEIGHT: 69 IN | WEIGHT: 214 LBS

## 2022-08-29 PROCEDURE — 99215 OFFICE O/P EST HI 40 MIN: CPT | Mod: 25

## 2022-08-29 PROCEDURE — 93000 ELECTROCARDIOGRAM COMPLETE: CPT | Mod: 59

## 2022-08-29 RX ORDER — AMLODIPINE BESYLATE 10 MG/1
10 TABLET ORAL
Qty: 90 | Refills: 1 | Status: DISCONTINUED | COMMUNITY
Start: 2020-03-11 | End: 2022-08-29

## 2022-08-29 RX ORDER — TAMSULOSIN HYDROCHLORIDE 0.4 MG/1
0.4 CAPSULE ORAL TWICE DAILY
Refills: 0 | Status: ACTIVE | COMMUNITY

## 2022-08-29 NOTE — PHYSICAL EXAM
[Well Developed] : well developed [Well Nourished] : well nourished [No Acute Distress] : no acute distress [Normal Conjunctiva] : normal conjunctiva [Normal Venous Pressure] : normal venous pressure [No Carotid Bruit] : no carotid bruit [Normal S1, S2] : normal S1, S2 [No Murmur] : no murmur [No Rub] : no rub [No Gallop] : no gallop [Clear Lung Fields] : clear lung fields [Good Air Entry] : good air entry [No Respiratory Distress] : no respiratory distress  [Soft] : abdomen soft [Non Tender] : non-tender [Normal Gait] : normal gait [No Edema] : no edema [No Rash] : no rash [No Skin Lesions] : no skin lesions [Moves all extremities] : moves all extremities [No Focal Deficits] : no focal deficits [Normal Speech] : normal speech [Alert and Oriented] : alert and oriented [Normal memory] : normal memory

## 2022-08-29 NOTE — DISCUSSION/SUMMARY
[FreeTextEntry1] : Dizziness, with no recent tien syncope and prior normal EPS and ILR recording. No more dizziness since meds split to AM-PM. Unlikely to be due to tachy or mady events. \par \par Palpitation, PVCs and NSVT. Much less palpitation since metoprolol increased from 25 to 50 mg which is tolerated now. No sudden syncope or presyncope. \par - Will plan periodic MCOT and intervene for PVC suppression if symptomatic again, PVC burden more than 10% with LV dilatation or dysfunction or PVC induced VF. \par \par Bifascicular block, no syncope. No current indication for pacing support. Will c/w periodic monitoring. Counseled to seek urgent medical attention if got sudden syncope/presyncope. \par \par - MCOT now\par - Follow up in 4-6 weeks [EKG obtained to assist in diagnosis and management of assessed problem(s)] : EKG obtained to assist in diagnosis and management of assessed problem(s)

## 2022-08-29 NOTE — REVIEW OF SYSTEMS
[Fever] : no fever [Chills] : no chills [Feeling Fatigued] : not feeling fatigued [SOB] : no shortness of breath [Dyspnea on exertion] : not dyspnea during exertion [Chest Discomfort] : no chest discomfort [Leg Claudication] : no intermittent leg claudication [Palpitations] : palpitations [Orthopnea] : no orthopnea [Syncope] : no syncope [Wheezing] : no wheezing [Nausea] : no nausea [Vomiting] : no vomiting [Dizziness] : no dizziness

## 2022-08-29 NOTE — ASSESSMENT
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 \par \par Patient reported feeling dizziness while receiving a massage. He reported he was receiving a medical massage on his LUE and started to feel a flushed sensation when he sat up. This episode persisted for around 20 minutes. Prior to this episode he had two other incidents witnessed by his wife where he would be walking and suddenly have that flushed sensation and nearly pass out. He had enough time to sit down prior to passing out. These who prior events were in the setting of walking. On prior office visit on 6/1/22 to have complaints of intermittent palpitations. He had a ZioPatch ordered and showed 2 runs NSVT, longest 25 beats (150bpm rage). Pt was instructed to increase to Metoprolol 50 mg daily, which he did for approx. 5 days. He had reported fatigue and was told to decrease Toprol back down to 25 mg daily. He reported jaw pain and was advised to come in today for evaluation.\par \par Given his PVC/NSVT, and bifascicular block plus dizziness, EP was consulted during his admission 6/2022. We noted that his symptoms c/w mostly SR and occasionally with SR plus PVCs on prior monitoring. He also indicated no major change in his symptoms compared to the dizziness he had few years ago which led to extensive EP workup then. He was seen by Dr. Julian in the past and underwent an EPS in 2015 for unexplained syncope which revealed a HV interval of 48ms. An ILR was inserted the following day and explanted two years later in 2017. According to the patient no significant events were recorded. \par \par His metoprolol was increased again to 50 mg during his admission and his BP medications were split between morning and PM. He underwent a LHC which showed patent stents. We did not see the need for repeat EPS during his admission. We discussed ILR which he declined. Since discharge, he reports resolution of his dizziness after splitting his BP medications. He is now tolerating the 50 mg of metoprolol with very rare and brief (1-2 seconds) palpitation. No syncope.

## 2022-08-29 NOTE — HISTORY OF PRESENT ILLNESS
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 \par \par Patient reported feeling dizziness while receiving a massage. He reported he was receiving a medical massage on his LUE and started to feel a flushed sensation when he sat up. This episode persisted for around 20 minutes. Prior to this episode he had two other incidents witnessed by his wife where he would be walking and suddenly have that flushed sensation and nearly pass out. He had enough time to sit down prior to passing out. These who prior events were in the setting of walking. On prior office visit on 6/1/22 to have complaints of intermittent palpitations. He had a ZioPatch ordered and showed 2 runs NSVT, longest 25 beats (150bpm rage). Pt was instructed to increase to Metoprolol 50 mg daily, which he did for approx. 5 days. He had reported fatigue and was told to decrease Toprol back down to 25 mg daily. He reported jaw pain and was advised to come in today for evaluation.\par \par Given his PVC/NSVT, and bifascicular block plus dizziness, EP was consulted during his admission 6/2022. We noted that his symptoms c/w mostly SR and occasionally with SR plus PVCs on prior monitoring. He also indicated no major change in his symptoms compared to the dizziness he had few years ago which led to extensive EP workup then. He was seen by Dr. Julian in the past and underwent an EPS in 2015 for unexplained syncope which revealed a HV interval of 48ms. An ILR was inserted the following day and explanted two years later in 2017. According to the patient no significant events were recorded. \par \par His metoprolol was increased again to 50 mg during his admission and his BP medications were split between morning and PM. He underwent a LHC which showed patent stents. We did not see the need for repeat EPS during his admission. We discussed ILR which he declined. Since discharge, he reports resolution of his dizziness after splitting his BP medications. He is now tolerating the 50 mg of metoprolol with very rare and brief (1-2 seconds) palpitation. No syncope.  \par \par \par Social history:\par Negative for smoking, illicit drugs or alcohol abuse.\par \par Family history:\par Negative for premature CAD or SCD. \par \par \par TESTS:\par EKG 8/29/2022: SR with bifascicular block. \par \par LHC [facility] 6/2022: Diagnostic Conclusions: \par Coronary angiography demonstrated minor luminal irregularities Patent Stents in LAD\par \par NST [facility] 6/2022:\par MPRESSIONS:\par * Myocardial Perfusion SPECT results are abnormal.\par * There are medium sized, severe defects in apical, distal anterior and dum-ab-amanil inferior walls that are fixed, Defects persist on prone imaging, with reduced systolic thickening/motion, consistent with infarcts, no clear evidence of ischemia.\par * Post-stress resting myocardial perfusion gated SPECT imaging was performed (LVEF = 46 %; LVEDV = 109 ml.), Hypokinesis of the apex and distal anterior, distal inferior walls.\par \par TTE [facility] 6/29/22\par Summary:\par  1. Left ventricular ejection fraction, by visual estimation, is 50 to 55%.\par  2. Technically adequate study.\par  3. Low normal global left ventricular systolic function.\par  4. Entire anterior septum, apical inferior segment, and apex are abnormal as described above.\par  5. Spectral Doppler shows impaired relaxation pattern of left ventricular myocardial filling (Grade I diastolic dysfunction).\par  6. Normal left atrial size.\par  7. Mild mitral annular calcification.\par  8. Mild thickening of the anterior and posterior mitral valve leaflets.\par  9. Trace mitral valve regurgitation.\par 10. Mild to moderate aortic regurgitation.\par \par Lupe LAVELL 6/2022:\par - SR with average HR 72 (). Rare PVCs. Two NSVTs, fastest 165 bpm which was the longest and lasted 9 seconds. No pauses or high grade AVB. Symptoms c/w SR and also with SR plus isolated ectopy.

## 2022-09-06 ENCOUNTER — EMERGENCY (EMERGENCY)
Facility: HOSPITAL | Age: 72
LOS: 1 days | Discharge: HOME | End: 2022-09-06
Attending: EMERGENCY MEDICINE
Payer: COMMERCIAL

## 2022-09-06 VITALS
DIASTOLIC BLOOD PRESSURE: 71 MMHG | HEART RATE: 82 BPM | WEIGHT: 214.95 LBS | RESPIRATION RATE: 16 BRPM | OXYGEN SATURATION: 96 % | TEMPERATURE: 98 F | SYSTOLIC BLOOD PRESSURE: 161 MMHG | HEIGHT: 69 IN

## 2022-09-06 DIAGNOSIS — Z98.890 OTHER SPECIFIED POSTPROCEDURAL STATES: Chronic | ICD-10-CM

## 2022-09-06 DIAGNOSIS — Z87.19 PERSONAL HISTORY OF OTHER DISEASES OF THE DIGESTIVE SYSTEM: Chronic | ICD-10-CM

## 2022-09-06 DIAGNOSIS — Z98.89 OTHER SPECIFIED POSTPROCEDURAL STATES: Chronic | ICD-10-CM

## 2022-09-06 DIAGNOSIS — Z09 ENCOUNTER FOR FOLLOW-UP EXAMINATION AFTER COMPLETED TREATMENT FOR CONDITIONS OTHER THAN MALIGNANT NEOPLASM: Chronic | ICD-10-CM

## 2022-09-06 DIAGNOSIS — Z90.89 ACQUIRED ABSENCE OF OTHER ORGANS: Chronic | ICD-10-CM

## 2022-09-06 DIAGNOSIS — Z95.5 PRESENCE OF CORONARY ANGIOPLASTY IMPLANT AND GRAFT: Chronic | ICD-10-CM

## 2022-09-06 PROCEDURE — 93010 ELECTROCARDIOGRAM REPORT: CPT

## 2022-09-06 PROCEDURE — 99284 EMERGENCY DEPT VISIT MOD MDM: CPT

## 2022-09-06 PROCEDURE — 93005 ELECTROCARDIOGRAM TRACING: CPT

## 2022-09-06 PROCEDURE — 99283 EMERGENCY DEPT VISIT LOW MDM: CPT

## 2022-09-06 NOTE — ED ADULT NURSE NOTE - CADM POA URETHRAL CATHETER
Laine Brown RN/FNA     No

## 2022-09-06 NOTE — ED ADULT NURSE NOTE - NSICDXPASTMEDICALHX_GEN_ALL_CORE_FT
PAST MEDICAL HISTORY:  BPH (benign prostatic hyperplasia)     Coronary artery disease involving native coronary artery of native heart with unstable angina pector     COVID-19 vaccine series completed Moderna x 2    [place] (hard of hearing) Understands lip reading and loud, low volume speech. Does not know sign language.    Hypertension     Hypertension     MI (myocardial infarction) may 2010 stents x 2    Mixed hyperlipidemia     NSVT (nonsustained ventricular tachycardia)     Stage 3 chronic kidney disease     TIA (transient ischemic attack)

## 2022-09-06 NOTE — ED PROVIDER NOTE - ATTENDING CONTRIBUTION TO CARE
I personally saw the patient with the resident, and completed the key components of the history and physical exam. I then discussed the management plan with the resident.  EKG similar to prior, symptoms improved, vss, stable for d/c

## 2022-09-06 NOTE — ED PROVIDER NOTE - PHYSICAL EXAMINATION
General: Well appearing in no acute distress. Alert and cooperative.   Head: Normocephalic, atraumatic.  Eyes: PERRLA. No conjunctival injection. No scleral icterus. EOMI  ENMT: Atraumatic external nose and ears. Moist mucous membranes. Oropharynx clear.  Neck: Soft and supple. Full ROM without pain. No midline tenderness. No Thyromegaly. No lymphadenopathy.  Cardiac: Regular rate and regular rhythm. No murmurs. Peripheral pulses 2+ and symmetric in all extremities. No LE edema.  Resp: Unlabored respiratory effort. Lungs CTAB. Speaking in full sentences. No wheezes.  Abd: Soft, non-tender, non-distended. No guarding or rebound. No scars.  MSK: Spine midline and non-tender. No CVA tenderness.    Skin: Warm and dry. No rashes, abrasions, or lacerations.  Neuro: AO x 3. Moves all extremities symmetrically. Motor strength and sensation grossly intact. normal gait.

## 2022-09-06 NOTE — ED PROVIDER NOTE - PATIENT PORTAL LINK FT
You can access the FollowMyHealth Patient Portal offered by [facility] by registering at the following website: [email or web address]. By joining [facility]’s FollowMyHealth portal, you will also be able to view your health information using other applications (apps) compatible with our system.

## 2022-09-06 NOTE — ED PROVIDER NOTE - NSICDXPASTMEDICALHX_GEN_ALL_CORE_FT
PAST MEDICAL HISTORY:  BPH (benign prostatic hyperplasia)     Coronary artery disease involving native coronary artery of native heart with unstable angina pector     COVID-19 vaccine series completed Moderna x 2    [place] (hard of hearing) Understands lip reading and loud, low volume speech. Does not know sign language.    Hypertension     Hypertension     MI (myocardial infarction) may 2010 stents x 2    Mixed hyperlipidemia     NSVT (nonsustained ventricular tachycardia)     Stage 3 chronic kidney disease     TIA (transient ischemic attack)

## 2022-09-06 NOTE — ED ADULT NURSE NOTE - NSPATIENTFLAG_GEN_A_ER
Patient requests all Lab, Cardiology, and Radiology Results on their Discharge Instructions
Purple DH (Discharge Huddle; Vulnerable Patient)

## 2022-09-06 NOTE — ED ADULT NURSE NOTE - OBJECTIVE STATEMENT
c/o palpitations that resolved prior to arrival. Denies chest pain, shortness of breath, nausea, vomiting, hematuria, dysuria, dark stools, focal neurologic symptoms.

## 2022-09-06 NOTE — ED ADULT TRIAGE NOTE - CHIEF COMPLAINT QUOTE
Pt A&Ox4 states "I laid down at the chiropractor and I overheated." BIBA c/o overheating and dizziness. Patient has hx of MI 12 years ago with 2 stents, pt laid down felt hot, got up felt dizziness and palpitations resolving at this time.

## 2022-09-06 NOTE — ED PROVIDER NOTE - CLINICAL SUMMARY MEDICAL DECISION MAKING FREE TEXT BOX
72y Male with resolved lightheadedness and palpitations. No chest pain, shortness of breath. Well appearing, hemodynamically stable, neurovascularly intact. ECG normal. 72y Male with resolved lightheadedness and palpitations. No chest pain, shortness of breath. Well appearing, hemodynamically stable, neurovascularly intact. ECG normal. Patient asymptomatic, at baseline. will dc with pcp follow up.

## 2022-09-06 NOTE — ED PROVIDER NOTE - OBJECTIVE STATEMENT
72y Male with history of HTN, CAD, MI s/p PCI, CKD presenting lightheadedness after laying down on table at chiropractor office prior to adjustment. Patient states he did not receive any adjustment. Also reports brief episode of palpitations that resolved prior to arrival. Denies chest pain, shortness of breath, nausea, vomiting, hematuria, dysuria, dark stools, focal neurologic symptoms.

## 2022-09-15 ENCOUNTER — NON-APPOINTMENT (OUTPATIENT)
Age: 72
End: 2022-09-15

## 2022-09-15 RX ORDER — AMLODIPINE BESYLATE 10 MG/1
10 TABLET ORAL DAILY
Qty: 90 | Refills: 2 | Status: ACTIVE | COMMUNITY
Start: 2022-06-17

## 2022-10-10 ENCOUNTER — APPOINTMENT (OUTPATIENT)
Dept: ELECTROPHYSIOLOGY | Facility: CLINIC | Age: 72
End: 2022-10-10

## 2022-10-13 NOTE — ED PROVIDER NOTE - OBJECTIVE STATEMENT
This is a 66 y/o M pt with a pmhx of deafness, HTN, CAD, hyperlipidemia, coronary artery stent, high cholesterol, MI, pacemaker, appendectomy and umbilical hernia repair presents to the ED c/o lightheadedness, blurred vision and unsteady gait at hat onset 2 days ago, exacerbating today. Notes headache that he describes as pressure. Took Claritin 1 day ago with relief. He then went to sleep and woke up with the same sensation this AM. He attempted to take a Claritin today with NO relief. Went to the urgent care center who told him to come to the ED today. Denies the sensation of the room spinning. Sensation is exacerbated when he moves. Reports an intermittent throbbing sensation at the top of his head. Taking beta-blockers, cholesterol medicine and aspirin. Non smoker, occasional smoker, no illicit drug use. NKDA. Denies chest pain, n/v/d/c, urinary symptoms, cough, fever, chills, rhinorrhea, fever, chills, recent travel, recent trauma, diaphoresis, bowel/bladder incontinence, numbness, tingling or any other complaints.   PMD: Dr. Rosa [place].  Cardiologist: Dr. Brown. [facility].  FHx: Aneurysm. No...

## 2023-02-08 ENCOUNTER — APPOINTMENT (OUTPATIENT)
Dept: CARDIOLOGY | Facility: CLINIC | Age: 73
End: 2023-02-08
Payer: COMMERCIAL

## 2023-02-08 VITALS
HEART RATE: 70 BPM | WEIGHT: 210 LBS | HEIGHT: 69 IN | DIASTOLIC BLOOD PRESSURE: 70 MMHG | BODY MASS INDEX: 31.1 KG/M2 | OXYGEN SATURATION: 97 % | SYSTOLIC BLOOD PRESSURE: 140 MMHG | TEMPERATURE: 97.4 F

## 2023-02-08 DIAGNOSIS — I47.29 OTHER VENTRICULAR TACHYCARDIA: ICD-10-CM

## 2023-02-08 PROCEDURE — 99215 OFFICE O/P EST HI 40 MIN: CPT

## 2023-02-08 NOTE — HISTORY OF PRESENT ILLNESS
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  Ziopatch as ordered and showed 2 runs VT, longest 25 beat. Pt was instructed to increase to Metoprolol 50 mg daily, which he did for approx 5 days. He had reported fatigue. Yesterday was told to decrease Toprol back down to 25 mg daily. He reported jaw pain and was advised to come in today for evaluation.\par The pt states he had  a "sensation in his jaw"/  " felt the outline of his bottom jaw" on Sunday and again yesterday. Yesterday he took Tylenol with relief. The jaw pain/ sensation fully resolved ans has not recurred.\par  \par He reports  feeling brief episodes of palpitations daily mostly felt with activity, but at times felt while seated\par He typically has no associated symptoms but he has had 2 episodes where he felt lightheaded, like a "rush throughout the body" and needed to sit down\par He denies any nausea, diaphoresis, SOB, syncope or falls \par He has never had chest pain aside from when he had an MI in 2010\par HIs wife states that his blood pressure yesterday was low for him after the episode of palpitations, SBP was 120\par Today he feels great and wants to go to [employer] Tuesday at a local restaurant \par \par 2/8/2023\par Returns for follow up as he need preoperative risk stratification prior to carpal tunnel surgery and trigger finger surgery \par feels well. \par Catheterization in 2/22 , showed patent LAD stent and mild disease otherwise\par -141 mmHg and 70 diastolic at home \par He exercises by walking and bicycle and has no exertional chest pain or  SOB, palpitations, dizziness or syncope \par He will have palpitations if he doesn't take his medicine \par \par

## 2023-02-08 NOTE — REVIEW OF SYSTEMS
[Joint Pain] : joint pain [Dizziness] : dizziness [Fever] : no fever [Chills] : no chills [Feeling Fatigued] : not feeling fatigued [SOB] : no shortness of breath [Dyspnea on exertion] : not dyspnea during exertion [Chest Discomfort] : no chest discomfort [Lower Ext Edema] : no extremity edema [Leg Claudication] : no intermittent leg claudication [Palpitations] : no palpitations [Orthopnea] : no orthopnea [PND] : no PND [Syncope] : no syncope [Cough] : no cough [Coughing Up Blood] : no hemoptysis [Nausea] : no nausea [Vomiting] : no vomiting [Blood in stool] : no blood in stoo [Hematuria] : no hematuria [Myalgia] : no myalgia [Rash] : no rash [Convulsions] : no convulsions [Confusion] : no confusion was observed [Easy Bleeding] : no tendency for easy bleeding [Easy Bruising] : no tendency for easy bruising

## 2023-02-08 NOTE — END OF VISIT
[Time Spent: ___ minutes] : I have spent [unfilled] minutes of time on the encounter. [FreeTextEntry3] : Discussed with NP. Agree with history, physical , assessment and plan\par

## 2023-02-08 NOTE — PHYSICAL EXAM
[Normal] : clear lung fields, good air entry, no respiratory distress [Normal Gait] : normal gait [No Edema] : no edema [No Cyanosis] : no cyanosis [No Clubbing] : no clubbing [No Rash] : no rash [Moves all extremities] : moves all extremities [No Focal Deficits] : no focal deficits [Normal Speech] : normal speech [Alert and Oriented] : alert and oriented [de-identified] : deferred-wearing mask [de-identified] : right carotid bruit

## 2023-02-08 NOTE — ASSESSMENT
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  Ziopatch as ordered and showed 2 runs VT, longest 25 beat. Pt was instructed to increase to Metoprolol 50 mg daily, which he did for approx 5 days. He had reported fatigue. Yesterday was told to decrease Toprol back down to 25 mg daily. He reported jaw pain and was advised to come in today for evaluation.\par -Jaw pain fully resolved with Tylenol. Pt feels great today. EKG without any acute ST T changes\par \par 1. CAD, LAD stent 2010, palpitations, 2 runs VT on Zio monitor, longest 25 beat\par - IToprol 50 mg daily,\par -continue ASA\par \par 2.  HTN\par - Toprol  50 mg daily \par - diet modification discussed, advised to lower his sodium intake, avoid hot dogs \par \par 3- Patient had prior MI , he has RCRI of 1 , low risk for perioperative cardiac events especially  going for low risk surgery and there is no contraindications to proceed with sugery ( [facility] patent stents in 7/2022)  \par \par 4- Hyperlipidemia on atorvastation 20 mg, goal LDL < 70 mg/dl \par \par 5-  Right carotid bruit, will order carotid U/S , hx of mild carotid plaque in 2016\par

## 2023-03-01 NOTE — REASON FOR VISIT
Addended by: ISAAK WORLEY on: 3/1/2023 11:01 AM     Modules accepted: Orders    
[Follow-Up: _____] : a [unfilled] follow-up visit

## 2023-03-06 ENCOUNTER — APPOINTMENT (OUTPATIENT)
Dept: CARDIOLOGY | Facility: CLINIC | Age: 73
End: 2023-03-06
Payer: COMMERCIAL

## 2023-03-06 PROCEDURE — 93880 EXTRACRANIAL BILAT STUDY: CPT

## 2023-04-11 ENCOUNTER — OFFICE (OUTPATIENT)
Dept: URBAN - METROPOLITAN AREA CLINIC 12 | Facility: CLINIC | Age: 73
Setting detail: OPHTHALMOLOGY
End: 2023-04-11
Payer: COMMERCIAL

## 2023-04-11 DIAGNOSIS — H02.831: ICD-10-CM

## 2023-04-11 DIAGNOSIS — H18.513: ICD-10-CM

## 2023-04-11 DIAGNOSIS — H40.013: ICD-10-CM

## 2023-04-11 DIAGNOSIS — H43.811: ICD-10-CM

## 2023-04-11 DIAGNOSIS — H16.223: ICD-10-CM

## 2023-04-11 DIAGNOSIS — H02.34: ICD-10-CM

## 2023-04-11 DIAGNOSIS — H43.393: ICD-10-CM

## 2023-04-11 DIAGNOSIS — H02.31: ICD-10-CM

## 2023-04-11 DIAGNOSIS — H25.13: ICD-10-CM

## 2023-04-11 DIAGNOSIS — H02.834: ICD-10-CM

## 2023-04-11 PROCEDURE — 92133 CPTRZD OPH DX IMG PST SGM ON: CPT | Performed by: OPHTHALMOLOGY

## 2023-04-11 PROCEDURE — 99214 OFFICE O/P EST MOD 30 MIN: CPT | Performed by: OPHTHALMOLOGY

## 2023-04-11 ASSESSMENT — SPHEQUIV_DERIVED
OD_SPHEQUIV: 1.75
OS_SPHEQUIV: 0.625
OD_SPHEQUIV: 1.625
OD_SPHEQUIV: 1.75
OS_SPHEQUIV: 0.625
OS_SPHEQUIV: 0.75

## 2023-04-11 ASSESSMENT — PACHYMETRY
OD_CT_CORRECTION: 0
OS_CT_CORRECTION: -1
OS_CT_UM: 554
OD_CT_UM: 549

## 2023-04-11 ASSESSMENT — REFRACTION_MANIFEST
OS_VA1: 20/20
OD_VA1: 20/25-2
OD_ADD: +2.50
OS_SPHERE: +1.25
OS_ADD: +2.50
OD_SPHERE: +2.00
OS_AXIS: 170
OS_ADD: +2.50
OS_SPHERE: +1.00
OS_AXIS: 003
OD_VA1: 20/20
OS_VA1: 20/30
OD_AXIS: 150
OD_CYLINDER: -0.50
OD_AXIS: 146
OD_CYLINDER: -0.75
OD_SPHERE: +2.00
OS_CYLINDER: -1.00
OD_ADD: +2.50
OS_CYLINDER: -0.75

## 2023-04-11 ASSESSMENT — SUPERFICIAL PUNCTATE KERATITIS (SPK)
OD_SPK: 1+
OS_SPK: 1+

## 2023-04-11 ASSESSMENT — REFRACTION_CURRENTRX
OD_ADD: +2.25
OD_OVR_VA: 20/
OD_CYLINDER: -1.00
OS_SPHERE: +1.25
OD_VPRISM_DIRECTION: PROGS
OS_CYLINDER: -1.25
OS_OVR_VA: 20/
OS_AXIS: 175
OS_AXIS: 001
OS_SPHERE: +1.25
OD_CYLINDER: -0.75
OD_SPHERE: +1.50
OD_VPRISM_DIRECTION: PROGS
OS_CYLINDER: -1.25
OS_VPRISM_DIRECTION: PROGS
OS_ADD: +2.25
OD_AXIS: 147
OD_SPHERE: +1.25
OD_OVR_VA: 20/
OS_VPRISM_DIRECTION: PROGS
OD_ADD: +3.00
OD_AXIS: 170
OS_OVR_VA: 20/
OS_ADD: +3.00

## 2023-04-11 ASSESSMENT — CORNEAL DYSTROPHY - POSTERIOR
OS_POSTERIORDYSTROPHY: 2+ GUTTATA
OD_POSTERIORDYSTROPHY: 2+ GUTTATA

## 2023-04-11 ASSESSMENT — TONOMETRY
OS_IOP_MMHG: 14
OD_IOP_MMHG: 16

## 2023-04-11 ASSESSMENT — VISUAL ACUITY
OS_BCVA: 20/40+1
OD_BCVA: 20/40-1

## 2023-04-11 ASSESSMENT — KERATOMETRY
OS_AXISANGLE_DEGREES: 088
OS_K1POWER_DIOPTERS: 43.00
OD_K1POWER_DIOPTERS: 42.25
METHOD_AUTO_MANUAL: AUTO
OD_K2POWER_DIOPTERS: 42.50
OD_AXISANGLE_DEGREES: 079
OS_K2POWER_DIOPTERS: 43.25

## 2023-04-11 ASSESSMENT — AXIALLENGTH_DERIVED
OD_AL: 23.374
OS_AL: 23.4866
OS_AL: 23.4866
OD_AL: 23.3263
OS_AL: 23.4384
OD_AL: 23.3263

## 2023-04-11 ASSESSMENT — LID EXAM ASSESSMENTS
OD_COMMENTS: INCISION INTACT
OS_COMMENTS: INCISION INTACT

## 2023-04-11 ASSESSMENT — REFRACTION_AUTOREFRACTION
OS_CYLINDER: -0.75
OD_AXIS: 146
OS_SPHERE: +1.00
OD_CYLINDER: -0.50
OD_SPHERE: +2.00
OS_AXIS: 003

## 2023-04-11 ASSESSMENT — CONFRONTATIONAL VISUAL FIELD TEST (CVF)
OS_FINDINGS: FULL
OD_FINDINGS: FULL

## 2023-05-23 ENCOUNTER — OFFICE (OUTPATIENT)
Dept: URBAN - METROPOLITAN AREA CLINIC 12 | Facility: CLINIC | Age: 73
Setting detail: OPHTHALMOLOGY
End: 2023-05-23
Payer: COMMERCIAL

## 2023-05-23 DIAGNOSIS — H25.13: ICD-10-CM

## 2023-05-23 DIAGNOSIS — H25.11: ICD-10-CM

## 2023-05-23 PROCEDURE — 92136 OPHTHALMIC BIOMETRY: CPT | Performed by: OPHTHALMOLOGY

## 2023-05-23 PROCEDURE — 99213 OFFICE O/P EST LOW 20 MIN: CPT | Performed by: OPHTHALMOLOGY

## 2023-05-23 ASSESSMENT — REFRACTION_AUTOREFRACTION
OD_SPHERE: +1.50
OS_SPHERE: +1.00
OS_CYLINDER: -0.75
OD_AXIS: 177
OD_CYLINDER: -0.50
OS_AXIS: 8

## 2023-05-23 ASSESSMENT — SPHEQUIV_DERIVED
OD_SPHEQUIV: 1.75
OD_SPHEQUIV: 1.625
OD_SPHEQUIV: 1.25
OS_SPHEQUIV: 0.75
OS_SPHEQUIV: 0.625
OS_SPHEQUIV: 0.625

## 2023-05-23 ASSESSMENT — LID EXAM ASSESSMENTS
OS_COMMENTS: INCISION INTACT
OD_COMMENTS: INCISION INTACT

## 2023-05-23 ASSESSMENT — KERATOMETRY
OS_AXISANGLE_DEGREES: 86
OS_K1POWER_DIOPTERS: 42.75
METHOD_AUTO_MANUAL: AUTO
OD_K2POWER_DIOPTERS: 42.50
OS_K2POWER_DIOPTERS: 43.25
OD_AXISANGLE_DEGREES: 77
OD_K1POWER_DIOPTERS: 42.25

## 2023-05-23 ASSESSMENT — REFRACTION_CURRENTRX
OS_ADD: +2.25
OD_SPHERE: +1.25
OS_CYLINDER: -1.00
OD_AXIS: 146
OS_SPHERE: +1.25
OS_AXIS: 175
OD_OVR_VA: 20/
OS_AXIS: 178
OD_VPRISM_DIRECTION: PROGS
OS_OVR_VA: 20/
OS_CYLINDER: -1.25
OS_VPRISM_DIRECTION: PROGS
OD_SPHERE: +1.50
OS_OVR_VA: 20/
OD_OVR_VA: 20/
OD_ADD: +2.25
OS_VPRISM_DIRECTION: PROGS
OD_CYLINDER: -1.00
OD_VPRISM_DIRECTION: PROGS
OS_SPHERE: +1.25
OS_ADD: +3.00
OD_ADD: +3.00
OD_CYLINDER: -0.75
OD_AXIS: 170

## 2023-05-23 ASSESSMENT — REFRACTION_MANIFEST
OS_SPHERE: +1.25
OD_ADD: +2.50
OD_VA1: 20/25-2
OS_CYLINDER: -0.75
OS_VA1: 20/20
OS_AXIS: 170
OS_ADD: +2.50
OD_VA1: 20/20
OD_SPHERE: +2.00
OS_SPHERE: +1.00
OD_AXIS: 146
OD_CYLINDER: -0.75
OD_CYLINDER: -0.50
OD_ADD: +2.50
OD_AXIS: 150
OS_VA1: 20/30
OS_ADD: +2.50
OS_CYLINDER: -1.00
OS_AXIS: 003
OD_SPHERE: +2.00

## 2023-05-23 ASSESSMENT — PACHYMETRY
OS_CT_UM: 554
OS_CT_CORRECTION: -1
OD_CT_CORRECTION: 0
OD_CT_UM: 549

## 2023-05-23 ASSESSMENT — AXIALLENGTH_DERIVED
OS_AL: 23.5322
OS_AL: 23.5322
OD_AL: 23.3263
OS_AL: 23.4839
OD_AL: 23.374
OD_AL: 23.5184

## 2023-05-23 ASSESSMENT — VISUAL ACUITY
OD_BCVA: 20/50+2
OS_BCVA: 20/30-1

## 2023-05-23 ASSESSMENT — CORNEAL DYSTROPHY - POSTERIOR
OD_POSTERIORDYSTROPHY: 2+ GUTTATA
OS_POSTERIORDYSTROPHY: 2+ GUTTATA

## 2023-05-23 ASSESSMENT — SUPERFICIAL PUNCTATE KERATITIS (SPK)
OS_SPK: 1+
OD_SPK: 1+

## 2023-05-23 ASSESSMENT — CONFRONTATIONAL VISUAL FIELD TEST (CVF)
OD_FINDINGS: FULL
OS_FINDINGS: FULL

## 2023-05-23 ASSESSMENT — TONOMETRY
OS_IOP_MMHG: 16
OD_IOP_MMHG: 15

## 2023-06-05 ENCOUNTER — ASC (OUTPATIENT)
Dept: URBAN - METROPOLITAN AREA SURGERY 8 | Facility: SURGERY | Age: 73
Setting detail: OPHTHALMOLOGY
End: 2023-06-05
Payer: COMMERCIAL

## 2023-06-05 DIAGNOSIS — H25.11: ICD-10-CM

## 2023-06-05 DIAGNOSIS — H52.211: ICD-10-CM

## 2023-06-05 PROCEDURE — FEMTO PRECISION LASER CATARACT SURGERY: Performed by: OPHTHALMOLOGY

## 2023-06-05 PROCEDURE — 66984 XCAPSL CTRC RMVL W/O ECP: CPT | Performed by: OPHTHALMOLOGY

## 2023-06-06 ENCOUNTER — RX ONLY (RX ONLY)
Age: 73
End: 2023-06-06

## 2023-06-06 ENCOUNTER — OFFICE (OUTPATIENT)
Dept: URBAN - METROPOLITAN AREA CLINIC 12 | Facility: CLINIC | Age: 73
Setting detail: OPHTHALMOLOGY
End: 2023-06-06
Payer: COMMERCIAL

## 2023-06-06 DIAGNOSIS — Z96.1: ICD-10-CM

## 2023-06-06 PROBLEM — H25.12 CATARACT; LEFT EYE: Status: ACTIVE | Noted: 2023-05-23

## 2023-06-06 PROCEDURE — 99024 POSTOP FOLLOW-UP VISIT: CPT | Performed by: OPTOMETRIST

## 2023-06-06 ASSESSMENT — VISUAL ACUITY
OS_BCVA: 20/150
OD_BCVA: 20/40-2

## 2023-06-06 ASSESSMENT — CONFRONTATIONAL VISUAL FIELD TEST (CVF)
OS_FINDINGS: FULL
OD_FINDINGS: FULL

## 2023-06-06 ASSESSMENT — REFRACTION_CURRENTRX
OD_AXIS: 170
OD_OVR_VA: 20/
OS_CYLINDER: -1.00
OS_VPRISM_DIRECTION: PROGS
OD_VPRISM_DIRECTION: PROGS
OD_SPHERE: +1.50
OS_SPHERE: +1.25
OD_ADD: +2.25
OS_VPRISM_DIRECTION: PROGS
OS_CYLINDER: -1.25
OD_AXIS: 146
OD_VPRISM_DIRECTION: PROGS
OS_OVR_VA: 20/
OS_SPHERE: +1.25
OD_CYLINDER: -1.00
OD_OVR_VA: 20/
OS_ADD: +2.25
OD_CYLINDER: -0.75
OD_ADD: +3.00
OS_AXIS: 175
OS_ADD: +3.00
OS_OVR_VA: 20/
OS_AXIS: 178
OD_SPHERE: +1.25

## 2023-06-06 ASSESSMENT — REFRACTION_AUTOREFRACTION
OD_SPHERE: U/A
OS_CYLINDER: -0.75
OS_AXIS: 007
OS_SPHERE: +1.25

## 2023-06-06 ASSESSMENT — CORNEAL DYSTROPHY - POSTERIOR
OD_POSTERIORDYSTROPHY: 2+ GUTTATA
OS_POSTERIORDYSTROPHY: 2+ GUTTATA

## 2023-06-06 ASSESSMENT — REFRACTION_MANIFEST
OD_VA1: 20/25-2
OD_VA1: 20/20
OS_AXIS: 003
OS_ADD: +2.50
OD_ADD: +2.50
OD_AXIS: 146
OS_SPHERE: +1.00
OD_ADD: +2.50
OD_SPHERE: +2.00
OS_VA1: 20/30
OD_AXIS: 150
OS_AXIS: 170
OS_ADD: +2.50
OD_CYLINDER: -0.75
OD_CYLINDER: -0.50
OD_SPHERE: +2.00
OS_CYLINDER: -0.75
OS_SPHERE: +1.25
OS_CYLINDER: -1.00
OS_VA1: 20/20

## 2023-06-06 ASSESSMENT — SUPERFICIAL PUNCTATE KERATITIS (SPK)
OS_SPK: 1+
OD_SPK: 1+

## 2023-06-06 ASSESSMENT — SPHEQUIV_DERIVED
OS_SPHEQUIV: 0.75
OS_SPHEQUIV: 0.625
OS_SPHEQUIV: 0.875
OD_SPHEQUIV: 1.75
OD_SPHEQUIV: 1.625

## 2023-06-06 ASSESSMENT — LID EXAM ASSESSMENTS
OS_COMMENTS: INCISION INTACT
OD_COMMENTS: INCISION INTACT

## 2023-06-06 ASSESSMENT — PACHYMETRY
OS_CT_UM: 554
OD_CT_UM: 549
OD_CT_CORRECTION: 0
OS_CT_CORRECTION: -1

## 2023-06-06 ASSESSMENT — TONOMETRY
OS_IOP_MMHG: 13
OD_IOP_MMHG: 18

## 2023-06-06 ASSESSMENT — CORNEAL EDEMA CLINICAL DESCRIPTION: OD_CORNEALEDEMA: 1+

## 2023-06-06 ASSESSMENT — KERATOMETRY
OD_K1POWER_DIOPTERS: U/A
OS_K1POWER_DIOPTERS: 43.00
OS_AXISANGLE_DEGREES: 094
OS_K2POWER_DIOPTERS: 43.25
METHOD_AUTO_MANUAL: AUTO

## 2023-06-06 ASSESSMENT — AXIALLENGTH_DERIVED
OS_AL: 23.4384
OS_AL: 23.3905
OS_AL: 23.4866

## 2023-06-09 ENCOUNTER — APPOINTMENT (OUTPATIENT)
Dept: COLORECTAL SURGERY | Facility: CLINIC | Age: 73
End: 2023-06-09
Payer: COMMERCIAL

## 2023-06-09 DIAGNOSIS — K62.5 HEMORRHAGE OF ANUS AND RECTUM: ICD-10-CM

## 2023-06-09 PROCEDURE — 46221 LIGATION OF HEMORRHOID(S): CPT

## 2023-06-09 PROCEDURE — 99214 OFFICE O/P EST MOD 30 MIN: CPT | Mod: 25

## 2023-06-09 NOTE — HISTORY OF PRESENT ILLNESS
[FreeTextEntry1] : Here for POV.\par Had significant anorectal pain and pressure as anticipated for the first two weeks postop. \par Pain has now subsided.\par Stools are soft, but still feels that there is a blockage and cannot evacuate his stools without digitizing.\par \par 7/7/20\par Mr. Benavidez returns to the office for follow-up of his internal hemorrhoids.  \par 8/29/19 Suture ligation of internal hemorrhoids\par He reports noticing hemorrhoidal fullness in the right lateral aspect of his anal canal which he believes is impairing his ability to evacuate stools.  He admits to noncompliance with a high-fiber diet though reports drinking ample amounts of water.  No issues with rectal bleeding reported.\par He and his wife have been healthy and COVID free since the start of the pandemic.\par \par 3/17/21 Mr. Benavidez returns to the office for consultation. He reports that after receiving his second covid shot on Sunday, he developed significant diarrhea with resultant pain, swelling and bleeding. He used several hydrocortisone suppositories as well as the cream and had resultant improvement in symptoms but not complete resolution. There is no rectal bleeding or diarrhea, but he has continued pain, itching and discomfort.\par \par 7/30/21 Mr. Benavidez returns to the office for discussion of a screening colonoscopy.  He denies any abdominal pain, blood in his stool or recent change in bowel habits.  His last colonoscopy was 5 years earlier.\par \par 6/9/23 Mr. Benavidez returns to the office for followup. Since his last office appt, he has undergone followup colonoscopy which was otherwise unremarkable. His wife notes that a few days earlier, he had several episodes of rectal bleeding. Pt also reports some hemorrhoidal discomfort. Here for eval and treatment.

## 2023-06-09 NOTE — PHYSICAL EXAM
[Normal rectal exam] : exam was normal [Reduce Spontaneously] : a spontaneously reducible (grade II) [Skin Tags] : there were no residual hemorrhoidal skin tags seen [Normal] : was normal [None] : there was no rectal mass  [Gross Blood] : no gross blood [No Rash or Lesion] : No rash or lesion [Alert] : alert [Oriented to Person] : oriented to person [Oriented to Place] : oriented to place [Oriented to Time] : oriented to time [Calm] : calm [de-identified] : soft, NTND [de-identified] : full right posterior [de-identified] : No apparent distress [de-identified] : Normocephalic atraumatic [de-identified] : Moving all extremities x4

## 2023-06-09 NOTE — ASSESSMENT
[FreeTextEntry1] : Looks well and recovered uneventfully.\par Cont to report difficulty with evacuating stool.\par Suspect pelvic floor dysfunction and have recommended MRI defecography.\par He wants to revisit bowel regimen to ensure that he is bulking up his stools appropriately before doing more studies.\par Should there be no improvement over the course of a month, will return to office, and will be more agreeable to pursue defecography.\par \par 7/7/20  presents to the office for a follow-up visit.  In office today, we proceeded to rubber band ligate his right lateral hemorrhoidal column secondary to reports of swelling and discomfort.  He was reminded on the importance of adhering to a high-fiber diet with ample water intake in order to achieve a healthy and consistent bowel regimen.  He was reminded on what to expect post rubber band ligation, and can return to the office for repeat ligations as needed.\par \par 3/17/21 Mr. Benavidez presents to the office for a followup visit.  He recently had a Covid vaccine and shortly afterwards developed diarrhea with hemorrhoidal exacerbation.  This responded well to hydrocortisone suppositories as well as cream.  In office today, physical exam was remarkable for fairly erythematous, tender and macerated perianal skin.  He did have mildly engorged internal hemorrhoids, and we proceeded to rubber band ligate a posterior column.  He was advised to avoid rigorous perianal hygiene and calmoseptine was applied at the bedside.  I will renew the Anusol suppositories as well as the hydrocortisone creams.  He was advised to purchase over-the-counter calmoseptine for soothing relief.  Patient was reassured and wife was updated over the phone.\par \par 7/30/21 Mr. Benavidez returns to the office to discuss screening colonoscopy.\par The risks/benefits/alternatives for a colonoscopy were discussed. These include a less than 1% risk of bleeding should any polyps be biopsied and/or removed. There is also a less than 0.1% risk of perforation. The patient understands the need to adhere to a clear liquid diet the day prior to procedure as well as having to perform a bowel prep in order to allow for adequate visualization of the mucosal surfaces.  Followup colonoscopies will be scheduled based on the findings that are seen at the time of the procedure. Patient understands and is agreeable, and will proceed with consent and scheduling.\par \par 6/9/23 Mr. Benavidez returns to the office for followup. He had a followup colonoscopy in 8/2021 which was WNL. He had recent recurrent rectal bleeding.  Anoscopy in office today revealed a full and inflamed right posterior internal hemorrhoid.  We proceeded to rubber band ligate this column to good effect.  He was reminded on what to expect postprocedure and can follow-up as needed for additional ligations.  Patient understands, and is agreeable.

## 2023-06-13 ENCOUNTER — OFFICE (OUTPATIENT)
Dept: URBAN - METROPOLITAN AREA CLINIC 12 | Facility: CLINIC | Age: 73
Setting detail: OPHTHALMOLOGY
End: 2023-06-13
Payer: COMMERCIAL

## 2023-06-13 DIAGNOSIS — H25.12: ICD-10-CM

## 2023-06-13 PROCEDURE — 92136 OPHTHALMIC BIOMETRY: CPT | Performed by: OPHTHALMOLOGY

## 2023-06-13 ASSESSMENT — SPHEQUIV_DERIVED
OS_SPHEQUIV: 0.75
OD_SPHEQUIV: 0.125
OS_SPHEQUIV: 1
OD_SPHEQUIV: 1.75
OD_SPHEQUIV: 1.625
OS_SPHEQUIV: 0.625

## 2023-06-13 ASSESSMENT — REFRACTION_MANIFEST
OD_ADD: +2.50
OD_AXIS: 146
OS_SPHERE: +1.00
OS_VA1: 20/30
OS_ADD: +2.50
OS_VA1: 20/20
OD_SPHERE: +2.00
OS_CYLINDER: -1.00
OS_AXIS: 170
OD_VA1: 20/25-2
OS_AXIS: 003
OS_ADD: +2.50
OD_VA1: 20/20
OS_CYLINDER: -0.75
OD_ADD: +2.50
OD_SPHERE: +2.00
OS_SPHERE: +1.25
OD_AXIS: 150
OD_CYLINDER: -0.50
OD_CYLINDER: -0.75

## 2023-06-13 ASSESSMENT — REFRACTION_AUTOREFRACTION
OS_AXIS: 010
OD_SPHERE: +0.50
OS_CYLINDER: -0.50
OS_SPHERE: +1.25
OD_AXIS: 137
OD_CYLINDER: -0.75

## 2023-06-13 ASSESSMENT — REFRACTION_CURRENTRX
OD_AXIS: 146
OS_AXIS: 178
OD_OVR_VA: 20/
OD_CYLINDER: -0.75
OS_CYLINDER: -1.25
OD_VPRISM_DIRECTION: PROGS
OS_SPHERE: +1.25
OS_ADD: +2.25
OS_OVR_VA: 20/
OS_VPRISM_DIRECTION: PROGS
OD_ADD: +3.00
OD_SPHERE: +1.25
OD_CYLINDER: -1.00
OD_SPHERE: +1.50
OS_CYLINDER: -1.00
OD_VPRISM_DIRECTION: PROGS
OS_AXIS: 175
OD_ADD: +2.25
OS_OVR_VA: 20/
OD_AXIS: 170
OS_SPHERE: +1.25
OS_VPRISM_DIRECTION: PROGS
OS_ADD: +3.00
OD_OVR_VA: 20/

## 2023-06-13 ASSESSMENT — KERATOMETRY
OD_K2POWER_DIOPTERS: 42.25
METHOD_AUTO_MANUAL: AUTO
OS_K2POWER_DIOPTERS: 43.00
OD_K1POWER_DIOPTERS: 41.00
OS_AXISANGLE_DEGREES: 093
OD_AXISANGLE_DEGREES: 066
OS_K1POWER_DIOPTERS: 42.75

## 2023-06-13 ASSESSMENT — PACHYMETRY
OD_CT_UM: 549
OD_CT_CORRECTION: 0
OS_CT_UM: 554
OS_CT_CORRECTION: -1

## 2023-06-13 ASSESSMENT — AXIALLENGTH_DERIVED
OD_AL: 23.6476
OS_AL: 23.4329
OD_AL: 24.2498
OS_AL: 23.5294
OD_AL: 23.5988
OS_AL: 23.578

## 2023-06-13 ASSESSMENT — TONOMETRY
OD_IOP_MMHG: 13
OS_IOP_MMHG: 17

## 2023-06-13 ASSESSMENT — LID EXAM ASSESSMENTS
OS_COMMENTS: INCISION INTACT
OD_COMMENTS: INCISION INTACT

## 2023-06-13 ASSESSMENT — SUPERFICIAL PUNCTATE KERATITIS (SPK)
OD_SPK: 1+
OS_SPK: 1+

## 2023-06-13 ASSESSMENT — VISUAL ACUITY
OD_BCVA: 20/40+2
OS_BCVA: 20/30-1

## 2023-06-13 ASSESSMENT — CORNEAL DYSTROPHY - POSTERIOR
OS_POSTERIORDYSTROPHY: 2+ GUTTATA
OD_POSTERIORDYSTROPHY: 2+ GUTTATA

## 2023-06-13 ASSESSMENT — CONFRONTATIONAL VISUAL FIELD TEST (CVF)
OS_FINDINGS: FULL
OD_FINDINGS: FULL

## 2023-06-13 ASSESSMENT — CORNEAL EDEMA CLINICAL DESCRIPTION: OD_CORNEALEDEMA: 1+

## 2023-06-19 ENCOUNTER — ASC (OUTPATIENT)
Dept: URBAN - METROPOLITAN AREA SURGERY 8 | Facility: SURGERY | Age: 73
Setting detail: OPHTHALMOLOGY
End: 2023-06-19
Payer: COMMERCIAL

## 2023-06-19 DIAGNOSIS — H52.212: ICD-10-CM

## 2023-06-19 DIAGNOSIS — H25.12: ICD-10-CM

## 2023-06-19 PROCEDURE — FEMTO FEMTOSECOND LASER: Performed by: OPHTHALMOLOGY

## 2023-06-19 PROCEDURE — 66984 XCAPSL CTRC RMVL W/O ECP: CPT | Performed by: OPHTHALMOLOGY

## 2023-06-20 ENCOUNTER — OFFICE (OUTPATIENT)
Dept: URBAN - METROPOLITAN AREA CLINIC 12 | Facility: CLINIC | Age: 73
Setting detail: OPHTHALMOLOGY
End: 2023-06-20
Payer: COMMERCIAL

## 2023-06-20 ENCOUNTER — RX ONLY (RX ONLY)
Age: 73
End: 2023-06-20

## 2023-06-20 DIAGNOSIS — Z96.1: ICD-10-CM

## 2023-06-20 PROCEDURE — 99024 POSTOP FOLLOW-UP VISIT: CPT | Performed by: OPTOMETRIST

## 2023-06-20 ASSESSMENT — REFRACTION_CURRENTRX
OD_ADD: +2.25
OS_ADD: +3.00
OD_AXIS: 170
OS_OVR_VA: 20/
OS_VPRISM_DIRECTION: PROGS
OD_OVR_VA: 20/
OD_CYLINDER: -0.75
OS_SPHERE: +1.25
OS_OVR_VA: 20/
OS_VPRISM_DIRECTION: PROGS
OS_CYLINDER: -1.25
OD_CYLINDER: -1.00
OD_OVR_VA: 20/
OS_CYLINDER: -1.00
OD_VPRISM_DIRECTION: PROGS
OS_AXIS: 178
OS_SPHERE: +1.25
OD_SPHERE: +1.25
OD_AXIS: 146
OS_ADD: +2.25
OD_SPHERE: +1.50
OD_VPRISM_DIRECTION: PROGS
OD_ADD: +3.00
OS_AXIS: 175

## 2023-06-20 ASSESSMENT — KERATOMETRY
OS_K1POWER_DIOPTERS: 42.25
OS_AXISANGLE_DEGREES: 018
METHOD_AUTO_MANUAL: AUTO
OD_K1POWER_DIOPTERS: 41.50
OD_K2POWER_DIOPTERS: 42.50
OD_AXISANGLE_DEGREES: 080
OS_K2POWER_DIOPTERS: 44.25

## 2023-06-20 ASSESSMENT — REFRACTION_MANIFEST
OS_ADD: +2.50
OD_VA1: 20/20
OS_CYLINDER: -0.75
OS_AXIS: 003
OS_AXIS: 170
OD_ADD: +2.50
OD_SPHERE: +2.00
OS_SPHERE: +1.25
OD_SPHERE: +2.00
OD_AXIS: 150
OS_ADD: +2.50
OD_CYLINDER: -0.50
OD_VA1: 20/25-2
OS_VA1: 20/20
OS_SPHERE: +1.00
OD_AXIS: 146
OD_CYLINDER: -0.75
OS_VA1: 20/30
OS_CYLINDER: -1.00
OD_ADD: +2.50

## 2023-06-20 ASSESSMENT — SPHEQUIV_DERIVED
OS_SPHEQUIV: 0.75
OD_SPHEQUIV: 1.75
OD_SPHEQUIV: 0
OS_SPHEQUIV: 0.625
OD_SPHEQUIV: 1.625

## 2023-06-20 ASSESSMENT — PACHYMETRY
OD_CT_CORRECTION: 0
OS_CT_UM: 554
OD_CT_UM: 549
OS_CT_CORRECTION: -1

## 2023-06-20 ASSESSMENT — REFRACTION_AUTOREFRACTION
OD_CYLINDER: -1.50
OD_SPHERE: +0.75
OD_AXIS: 162

## 2023-06-20 ASSESSMENT — TONOMETRY
OS_IOP_MMHG: 14
OD_IOP_MMHG: 13

## 2023-06-20 ASSESSMENT — CORNEAL DYSTROPHY - POSTERIOR
OS_POSTERIORDYSTROPHY: 2+ GUTTATA
OD_POSTERIORDYSTROPHY: 2+ GUTTATA

## 2023-06-20 ASSESSMENT — AXIALLENGTH_DERIVED
OS_AL: 23.3932
OD_AL: 24.156
OS_AL: 23.4412
OD_AL: 23.51
OD_AL: 23.4618

## 2023-06-20 ASSESSMENT — CORNEAL EDEMA CLINICAL DESCRIPTION: OD_CORNEALEDEMA: 1+

## 2023-06-20 ASSESSMENT — SUPERFICIAL PUNCTATE KERATITIS (SPK)
OS_SPK: 1+
OD_SPK: 1+

## 2023-06-20 ASSESSMENT — LID EXAM ASSESSMENTS
OD_COMMENTS: INCISION INTACT
OS_COMMENTS: INCISION INTACT

## 2023-06-20 ASSESSMENT — VISUAL ACUITY
OS_BCVA: 20/30+2
OD_BCVA: 20/50+2

## 2023-06-20 ASSESSMENT — CONFRONTATIONAL VISUAL FIELD TEST (CVF)
OD_FINDINGS: FULL
OS_FINDINGS: FULL

## 2023-06-27 ENCOUNTER — OFFICE (OUTPATIENT)
Dept: URBAN - METROPOLITAN AREA CLINIC 12 | Facility: CLINIC | Age: 73
Setting detail: OPHTHALMOLOGY
End: 2023-06-27
Payer: COMMERCIAL

## 2023-06-27 ENCOUNTER — RX ONLY (RX ONLY)
Age: 73
End: 2023-06-27

## 2023-06-27 DIAGNOSIS — Z96.1: ICD-10-CM

## 2023-06-27 PROCEDURE — 99024 POSTOP FOLLOW-UP VISIT: CPT | Performed by: OPHTHALMOLOGY

## 2023-06-27 ASSESSMENT — REFRACTION_CURRENTRX
OS_CYLINDER: -1.00
OD_CYLINDER: -0.75
OS_OVR_VA: 20/
OD_VPRISM_DIRECTION: PROGS
OD_AXIS: 146
OD_ADD: +3.00
OD_SPHERE: +1.25
OD_AXIS: 170
OS_AXIS: 178
OS_ADD: +3.00
OD_VPRISM_DIRECTION: PROGS
OS_VPRISM_DIRECTION: PROGS
OS_CYLINDER: -1.25
OS_SPHERE: +1.25
OD_OVR_VA: 20/
OD_SPHERE: +1.50
OD_OVR_VA: 20/
OS_SPHERE: +1.25
OS_OVR_VA: 20/
OS_ADD: +2.25
OS_VPRISM_DIRECTION: PROGS
OD_ADD: +2.25
OD_CYLINDER: -1.00
OS_AXIS: 175

## 2023-06-27 ASSESSMENT — REFRACTION_MANIFEST
OS_AXIS: 170
OS_VA1: 20/20
OS_SPHERE: +1.00
OS_CYLINDER: -0.75
OS_VA1: 20/30
OD_SPHERE: +2.00
OD_AXIS: 146
OD_ADD: +2.50
OD_CYLINDER: -0.50
OS_CYLINDER: -1.00
OD_VA1: 20/20
OD_AXIS: 150
OD_VA1: 20/25-2
OS_ADD: +2.50
OS_AXIS: 003
OD_ADD: +2.50
OS_ADD: +2.50
OD_CYLINDER: -0.75
OD_SPHERE: +2.00
OS_SPHERE: +1.25

## 2023-06-27 ASSESSMENT — LID EXAM ASSESSMENTS
OS_COMMENTS: INCISION INTACT
OD_COMMENTS: INCISION INTACT

## 2023-06-27 ASSESSMENT — CONFRONTATIONAL VISUAL FIELD TEST (CVF)
OD_FINDINGS: FULL
OS_FINDINGS: FULL

## 2023-06-27 ASSESSMENT — KERATOMETRY
METHOD_AUTO_MANUAL: AUTO
OD_K2POWER_DIOPTERS: 42.25
OS_K1POWER_DIOPTERS: 41.50
OS_K2POWER_DIOPTERS: 43.00
OD_K1POWER_DIOPTERS: 41.25
OS_AXISANGLE_DEGREES: 042
OD_AXISANGLE_DEGREES: 048

## 2023-06-27 ASSESSMENT — REFRACTION_AUTOREFRACTION
OS_AXIS: 138
OS_SPHERE: +0.50
OS_CYLINDER: -1.00
OD_CYLINDER: -1.00
OD_AXIS: 127
OD_SPHERE: +0.75

## 2023-06-27 ASSESSMENT — AXIALLENGTH_DERIVED
OS_AL: 23.76
OD_AL: 23.6016
OD_AL: 24.1502
OD_AL: 23.553
OS_AL: 24.0602
OS_AL: 23.8095

## 2023-06-27 ASSESSMENT — SPHEQUIV_DERIVED
OD_SPHEQUIV: 1.75
OD_SPHEQUIV: 0.25
OD_SPHEQUIV: 1.625
OS_SPHEQUIV: 0.625
OS_SPHEQUIV: 0.75
OS_SPHEQUIV: 0

## 2023-06-27 ASSESSMENT — SUPERFICIAL PUNCTATE KERATITIS (SPK)
OD_SPK: 1+
OS_SPK: 1+

## 2023-06-27 ASSESSMENT — PACHYMETRY
OD_CT_UM: 549
OS_CT_UM: 554
OD_CT_CORRECTION: 0
OS_CT_CORRECTION: -1

## 2023-06-27 ASSESSMENT — CORNEAL EDEMA CLINICAL DESCRIPTION: OD_CORNEALEDEMA: T

## 2023-06-27 ASSESSMENT — CORNEAL DYSTROPHY - POSTERIOR
OS_POSTERIORDYSTROPHY: 2+ GUTTATA
OD_POSTERIORDYSTROPHY: 2+ GUTTATA

## 2023-06-27 ASSESSMENT — TONOMETRY
OD_IOP_MMHG: 14
OS_IOP_MMHG: 14

## 2023-06-27 ASSESSMENT — VISUAL ACUITY
OS_BCVA: 20/30
OD_BCVA: 20/30-1

## 2023-07-27 ENCOUNTER — OFFICE (OUTPATIENT)
Dept: URBAN - METROPOLITAN AREA CLINIC 12 | Facility: CLINIC | Age: 73
Setting detail: OPHTHALMOLOGY
End: 2023-07-27
Payer: COMMERCIAL

## 2023-07-27 DIAGNOSIS — Z96.1: ICD-10-CM

## 2023-07-27 DIAGNOSIS — H16.223: ICD-10-CM

## 2023-07-27 PROCEDURE — 99024 POSTOP FOLLOW-UP VISIT: CPT | Performed by: OPHTHALMOLOGY

## 2023-07-27 ASSESSMENT — CORNEAL DYSTROPHY - POSTERIOR
OS_POSTERIORDYSTROPHY: 2+ GUTTATA
OD_POSTERIORDYSTROPHY: 2+ GUTTATA

## 2023-07-27 ASSESSMENT — CORNEAL EDEMA CLINICAL DESCRIPTION: OS_CORNEALEDEMA: T

## 2023-07-27 ASSESSMENT — PACHYMETRY
OS_CT_UM: 554
OD_CT_CORRECTION: 0
OS_CT_CORRECTION: -1
OD_CT_UM: 549

## 2023-07-27 ASSESSMENT — LID EXAM ASSESSMENTS
OS_COMMENTS: INCISION INTACT
OD_COMMENTS: INCISION INTACT

## 2023-07-27 ASSESSMENT — SUPERFICIAL PUNCTATE KERATITIS (SPK)
OS_SPK: 1+
OD_SPK: 1+

## 2023-07-27 ASSESSMENT — TONOMETRY
OS_IOP_MMHG: 10
OD_IOP_MMHG: 11

## 2023-07-28 ASSESSMENT — REFRACTION_AUTOREFRACTION
OS_SPHERE: UNABLE
OD_SPHERE: +1.00
OD_AXIS: 133
OD_CYLINDER: -1.25

## 2023-07-28 ASSESSMENT — REFRACTION_CURRENTRX
OS_ADD: +3.00
OD_OVR_VA: 20/
OD_SPHERE: +1.25
OS_AXIS: 175
OS_SPHERE: +1.25
OS_OVR_VA: 20/
OS_VPRISM_DIRECTION: PROGS
OD_AXIS: 170
OD_CYLINDER: -0.75
OS_OVR_VA: 20/
OD_VPRISM_DIRECTION: PROGS
OD_CYLINDER: -1.00
OD_ADD: +2.25
OS_AXIS: 178
OS_SPHERE: +1.25
OD_VPRISM_DIRECTION: PROGS
OD_AXIS: 146
OS_ADD: +2.25
OD_ADD: +3.00
OS_VPRISM_DIRECTION: PROGS
OD_SPHERE: +1.50
OS_CYLINDER: -1.25
OD_OVR_VA: 20/
OS_CYLINDER: -1.00

## 2023-07-28 ASSESSMENT — REFRACTION_MANIFEST
OD_ADD: +2.50
OD_SPHERE: +2.00
OD_VA1: 20/20
OS_ADD: +2.50
OD_CYLINDER: -0.75
OS_VA1: 20/20
OS_AXIS: 003
OD_AXIS: 150
OD_VA1: 20/25-2
OS_AXIS: 170
OD_CYLINDER: -0.50
OD_AXIS: 146
OS_VA1: 20/30
OS_CYLINDER: -0.75
OS_CYLINDER: -1.00
OS_SPHERE: +1.25
OD_ADD: +2.50
OS_SPHERE: +1.00
OS_ADD: +2.50
OD_SPHERE: +2.00

## 2023-07-28 ASSESSMENT — SPHEQUIV_DERIVED
OD_SPHEQUIV: 1.75
OD_SPHEQUIV: 0.375
OS_SPHEQUIV: 0.625
OS_SPHEQUIV: 0.75
OD_SPHEQUIV: 1.625

## 2023-07-28 ASSESSMENT — KERATOMETRY
OS_K1POWER_DIOPTERS: 42.25
OD_K2POWER_DIOPTERS: 42.25
OD_AXISANGLE_DEGREES: 039
OS_AXISANGLE_DEGREES: 025
OS_K2POWER_DIOPTERS: 44.00
METHOD_AUTO_MANUAL: AUTO
OD_K1POWER_DIOPTERS: 41.50

## 2023-07-28 ASSESSMENT — VISUAL ACUITY
OD_BCVA: 20/50-2
OS_BCVA: 20/40

## 2023-07-28 ASSESSMENT — AXIALLENGTH_DERIVED
OD_AL: 23.5073
OS_AL: 23.4384
OS_AL: 23.4866
OD_AL: 23.5557
OD_AL: 24.0515

## 2023-08-03 ENCOUNTER — RX ONLY (RX ONLY)
Age: 73
End: 2023-08-03

## 2023-08-03 ENCOUNTER — OFFICE (OUTPATIENT)
Dept: URBAN - METROPOLITAN AREA CLINIC 12 | Facility: CLINIC | Age: 73
Setting detail: OPHTHALMOLOGY
End: 2023-08-03
Payer: COMMERCIAL

## 2023-08-03 DIAGNOSIS — Z96.1: ICD-10-CM

## 2023-08-03 DIAGNOSIS — H16.223: ICD-10-CM

## 2023-08-03 PROBLEM — H43.811 POSTERIOR VITREOUS DETACHMENT; RIGHT EYE: Status: ACTIVE | Noted: 2023-07-27

## 2023-08-03 PROCEDURE — 99024 POSTOP FOLLOW-UP VISIT: CPT | Performed by: OPHTHALMOLOGY

## 2023-08-03 ASSESSMENT — REFRACTION_CURRENTRX
OS_CYLINDER: -1.25
OD_OVR_VA: 20/
OD_SPHERE: +1.25
OS_SPHERE: +1.25
OS_OVR_VA: 20/
OD_CYLINDER: -1.00
OS_CYLINDER: -1.00
OD_SPHERE: +1.50
OD_AXIS: 170
OS_ADD: +3.00
OS_ADD: +2.25
OD_OVR_VA: 20/
OD_ADD: +3.00
OD_ADD: +2.25
OS_VPRISM_DIRECTION: PROGS
OS_VPRISM_DIRECTION: PROGS
OS_SPHERE: +1.25
OD_CYLINDER: -0.75
OD_AXIS: 146
OD_VPRISM_DIRECTION: PROGS
OS_OVR_VA: 20/
OD_VPRISM_DIRECTION: PROGS
OS_AXIS: 178
OS_AXIS: 175

## 2023-08-03 ASSESSMENT — REFRACTION_MANIFEST
OS_ADD: +2.50
OD_CYLINDER: -0.50
OD_VA1: 20/20
OS_AXIS: 003
OD_ADD: +2.50
OS_CYLINDER: -0.75
OS_VA1: 20/20
OS_SPHERE: +1.00
OD_SPHERE: +2.00
OD_AXIS: 146

## 2023-08-03 ASSESSMENT — CORNEAL EDEMA CLINICAL DESCRIPTION: OS_CORNEALEDEMA: T

## 2023-08-03 ASSESSMENT — KERATOMETRY
OS_K2POWER_DIOPTERS: 43.50
OS_K1POWER_DIOPTERS: 42.50
METHOD_AUTO_MANUAL: AUTO
OD_K2POWER_DIOPTERS: 42.25
OD_K1POWER_DIOPTERS: 41.75
OS_AXISANGLE_DEGREES: 020
OD_AXISANGLE_DEGREES: 057

## 2023-08-03 ASSESSMENT — AXIALLENGTH_DERIVED
OS_AL: 24.0269
OD_AL: 24.1051
OD_AL: 23.4618
OS_AL: 23.5322

## 2023-08-03 ASSESSMENT — VISUAL ACUITY
OS_BCVA: 20/25
OD_BCVA: 20/40

## 2023-08-03 ASSESSMENT — LID EXAM ASSESSMENTS
OS_COMMENTS: INCISION INTACT
OD_COMMENTS: INCISION INTACT

## 2023-08-03 ASSESSMENT — REFRACTION_AUTOREFRACTION
OD_AXIS: 138
OS_AXIS: 097
OD_SPHERE: +0.75
OS_CYLINDER: -0.25
OD_CYLINDER: -1.25
OS_SPHERE: -0.50

## 2023-08-03 ASSESSMENT — TONOMETRY
OD_IOP_MMHG: 14
OS_IOP_MMHG: 12

## 2023-08-03 ASSESSMENT — PACHYMETRY
OS_CT_UM: 554
OD_CT_CORRECTION: 0
OD_CT_UM: 549
OS_CT_CORRECTION: -1

## 2023-08-03 ASSESSMENT — CONFRONTATIONAL VISUAL FIELD TEST (CVF)
OS_FINDINGS: FULL
OD_FINDINGS: FULL

## 2023-08-03 ASSESSMENT — SPHEQUIV_DERIVED
OS_SPHEQUIV: 0.625
OD_SPHEQUIV: 1.75
OD_SPHEQUIV: 0.125
OS_SPHEQUIV: -0.625

## 2023-08-03 ASSESSMENT — CORNEAL DYSTROPHY - POSTERIOR
OS_POSTERIORDYSTROPHY: 2+ GUTTATA
OD_POSTERIORDYSTROPHY: 2+ GUTTATA

## 2023-08-03 ASSESSMENT — SUPERFICIAL PUNCTATE KERATITIS (SPK)
OD_SPK: T
OS_SPK: T

## 2023-08-09 ENCOUNTER — APPOINTMENT (OUTPATIENT)
Dept: COLORECTAL SURGERY | Facility: CLINIC | Age: 73
End: 2023-08-09
Payer: COMMERCIAL

## 2023-08-09 VITALS
RESPIRATION RATE: 14 BRPM | HEART RATE: 69 BPM | WEIGHT: 207 LBS | BODY MASS INDEX: 30.66 KG/M2 | OXYGEN SATURATION: 100 % | SYSTOLIC BLOOD PRESSURE: 146 MMHG | DIASTOLIC BLOOD PRESSURE: 75 MMHG | HEIGHT: 69 IN | TEMPERATURE: 97.7 F

## 2023-08-09 DIAGNOSIS — K64.8 OTHER HEMORRHOIDS: ICD-10-CM

## 2023-08-09 DIAGNOSIS — K59.00 CONSTIPATION, UNSPECIFIED: ICD-10-CM

## 2023-08-09 PROCEDURE — 99214 OFFICE O/P EST MOD 30 MIN: CPT | Mod: 25

## 2023-08-09 PROCEDURE — 46221 LIGATION OF HEMORRHOID(S): CPT

## 2023-08-09 NOTE — PHYSICAL EXAM
[Normal rectal exam] : exam was normal [Reduce Spontaneously] : a spontaneously reducible (grade II) [Skin Tags] : there were no residual hemorrhoidal skin tags seen [Normal] : was normal [None] : there was no rectal mass  [Gross Blood] : no gross blood [No Rash or Lesion] : No rash or lesion [Alert] : alert [Oriented to Person] : oriented to person [Oriented to Place] : oriented to place [Oriented to Time] : oriented to time [Calm] : calm [de-identified] : soft, NTND [de-identified] : full right posterior [de-identified] : No apparent distress [de-identified] : Normocephalic atraumatic [de-identified] : Moving all extremities x4

## 2023-08-09 NOTE — ASSESSMENT
[FreeTextEntry1] : Looks well and recovered uneventfully. Cont to report difficulty with evacuating stool. Suspect pelvic floor dysfunction and have recommended MRI defecography. He wants to revisit bowel regimen to ensure that he is bulking up his stools appropriately before doing more studies. Should there be no improvement over the course of a month, will return to office, and will be more agreeable to pursue defecography.  7/7/20  presents to the office for a follow-up visit.  In office today, we proceeded to rubber band ligate his right lateral hemorrhoidal column secondary to reports of swelling and discomfort.  He was reminded on the importance of adhering to a high-fiber diet with ample water intake in order to achieve a healthy and consistent bowel regimen.  He was reminded on what to expect post rubber band ligation, and can return to the office for repeat ligations as needed.  3/17/21 Mr. Benavidez presents to the office for a followup visit.  He recently had a Covid vaccine and shortly afterwards developed diarrhea with hemorrhoidal exacerbation.  This responded well to hydrocortisone suppositories as well as cream.  In office today, physical exam was remarkable for fairly erythematous, tender and macerated perianal skin.  He did have mildly engorged internal hemorrhoids, and we proceeded to rubber band ligate a posterior column.  He was advised to avoid rigorous perianal hygiene and calmoseptine was applied at the bedside.  I will renew the Anusol suppositories as well as the hydrocortisone creams.  He was advised to purchase over-the-counter calmoseptine for soothing relief.  Patient was reassured and wife was updated over the phone.  7/30/21 Mr. Benavidez returns to the office to discuss screening colonoscopy. The risks/benefits/alternatives for a colonoscopy were discussed. These include a less than 1% risk of bleeding should any polyps be biopsied and/or removed. There is also a less than 0.1% risk of perforation. The patient understands the need to adhere to a clear liquid diet the day prior to procedure as well as having to perform a bowel prep in order to allow for adequate visualization of the mucosal surfaces.  Followup colonoscopies will be scheduled based on the findings that are seen at the time of the procedure. Patient understands and is agreeable, and will proceed with consent and scheduling.  6/9/23 Mr. Benavidez returns to the office for followup. He had a followup colonoscopy in 8/2021 which was WNL. He had recent recurrent rectal bleeding.  Anoscopy in office today revealed a full and inflamed right posterior internal hemorrhoid.  We proceeded to rubber band ligate this column to good effect.  He was reminded on what to expect postprocedure and can follow-up as needed for additional ligations.  Patient understands, and is agreeable.  8/9/23 Mr. Benavidez presents to the office for follow-up. Since his last office appt, he has been feeling increasingly constipated with difficulties evacuating his stools.  He feels as if his hemorrhoids are blocking his ability to defecate.  In office today, hemorrhoids were noted to be grade 2 and not particularly engorged.  Despite this, we did proceed to rubber band ligate a posterior hemorrhoidal column to reassure him that they are not the cause of his issues with defecation.  He has been having issues with constipation and this is in spite of of an improved diet and ample water intake.  Dulcolax has been effective for him recently, but I have advised him to use MiraLAX on a nightly basis to facilitate a healthy bowel regimen.  He is agreeable with this and will follow-up as needed

## 2023-08-09 NOTE — HISTORY OF PRESENT ILLNESS
[FreeTextEntry1] : Here for POV. Had significant anorectal pain and pressure as anticipated for the first two weeks postop.  Pain has now subsided. Stools are soft, but still feels that there is a blockage and cannot evacuate his stools without digitizing.  7/7/20 Mr. Benavidez returns to the office for follow-up of his internal hemorrhoids.    8/29/19 Suture ligation of internal hemorrhoids He reports noticing hemorrhoidal fullness in the right lateral aspect of his anal canal which he believes is impairing his ability to evacuate stools.  He admits to noncompliance with a high-fiber diet though reports drinking ample amounts of water.  No issues with rectal bleeding reported. He and his wife have been healthy and COVID free since the start of the pandemic.  3/17/21 Mr. Benavidez returns to the office for consultation. He reports that after receiving his second covid shot on Sunday, he developed significant diarrhea with resultant pain, swelling and bleeding. He used several hydrocortisone suppositories as well as the cream and had resultant improvement in symptoms but not complete resolution. There is no rectal bleeding or diarrhea, but he has continued pain, itching and discomfort.  7/30/21 Mr. Benavidez returns to the office for discussion of a screening colonoscopy.  He denies any abdominal pain, blood in his stool or recent change in bowel habits.  His last colonoscopy was 5 years earlier.  6/9/23 Mr. Benavidez returns to the office for followup. Since his last office appt, he has undergone followup colonoscopy which was otherwise unremarkable. His wife notes that a few days earlier, he had several episodes of rectal bleeding. Pt also reports some hemorrhoidal discomfort. Here for eval and treatment.  8/9/23 Mr. Benavidez returns to the office for follow-up.  Since his last appointment, he reports that he has been experiencing increased constipation and difficulties evacuating his stools.  He has been eating cleanly and drinking ample amounts of water daily.  His wife has given him Dulcolax and this seems to allow him to have a bowel movement on a daily basis.  Despite this, he feels as if his hemorrhoids are blocking the area and that they are protruding.  Here now for further evaluation and treatment.

## 2023-08-19 ENCOUNTER — EMERGENCY (EMERGENCY)
Facility: HOSPITAL | Age: 73
LOS: 1 days | Discharge: HOME | End: 2023-08-19
Attending: STUDENT IN AN ORGANIZED HEALTH CARE EDUCATION/TRAINING PROGRAM
Payer: COMMERCIAL

## 2023-08-19 VITALS
HEART RATE: 73 BPM | SYSTOLIC BLOOD PRESSURE: 160 MMHG | RESPIRATION RATE: 18 BRPM | TEMPERATURE: 97 F | DIASTOLIC BLOOD PRESSURE: 75 MMHG | OXYGEN SATURATION: 97 %

## 2023-08-19 VITALS
DIASTOLIC BLOOD PRESSURE: 76 MMHG | HEART RATE: 74 BPM | OXYGEN SATURATION: 98 % | TEMPERATURE: 98 F | WEIGHT: 210.32 LBS | RESPIRATION RATE: 20 BRPM | SYSTOLIC BLOOD PRESSURE: 181 MMHG

## 2023-08-19 DIAGNOSIS — Z98.890 OTHER SPECIFIED POSTPROCEDURAL STATES: Chronic | ICD-10-CM

## 2023-08-19 DIAGNOSIS — Z90.89 ACQUIRED ABSENCE OF OTHER ORGANS: Chronic | ICD-10-CM

## 2023-08-19 DIAGNOSIS — Z09 ENCOUNTER FOR FOLLOW-UP EXAMINATION AFTER COMPLETED TREATMENT FOR CONDITIONS OTHER THAN MALIGNANT NEOPLASM: Chronic | ICD-10-CM

## 2023-08-19 DIAGNOSIS — Z95.5 PRESENCE OF CORONARY ANGIOPLASTY IMPLANT AND GRAFT: Chronic | ICD-10-CM

## 2023-08-19 DIAGNOSIS — Z87.19 PERSONAL HISTORY OF OTHER DISEASES OF THE DIGESTIVE SYSTEM: Chronic | ICD-10-CM

## 2023-08-19 DIAGNOSIS — Z98.89 OTHER SPECIFIED POSTPROCEDURAL STATES: Chronic | ICD-10-CM

## 2023-08-19 LAB
ALBUMIN SERPL ELPH-MCNC: 3.8 G/DL — SIGNIFICANT CHANGE UP (ref 3.3–5.2)
ALP SERPL-CCNC: 72 U/L — SIGNIFICANT CHANGE UP (ref 40–120)
ALT FLD-CCNC: 22 U/L — SIGNIFICANT CHANGE UP
ANION GAP SERPL CALC-SCNC: 14 MMOL/L — SIGNIFICANT CHANGE UP (ref 5–17)
ANISOCYTOSIS BLD QL: SLIGHT — SIGNIFICANT CHANGE UP
APTT BLD: 28.3 SEC — SIGNIFICANT CHANGE UP (ref 24.5–35.6)
AST SERPL-CCNC: 23 U/L — SIGNIFICANT CHANGE UP
BASOPHILS # BLD AUTO: 0.09 K/UL — SIGNIFICANT CHANGE UP (ref 0–0.2)
BASOPHILS NFR BLD AUTO: 0.9 % — SIGNIFICANT CHANGE UP (ref 0–2)
BILIRUB SERPL-MCNC: 0.7 MG/DL — SIGNIFICANT CHANGE UP (ref 0.4–2)
BUN SERPL-MCNC: 24.6 MG/DL — HIGH (ref 8–20)
CALCIUM SERPL-MCNC: 8.8 MG/DL — SIGNIFICANT CHANGE UP (ref 8.4–10.5)
CHLORIDE SERPL-SCNC: 104 MMOL/L — SIGNIFICANT CHANGE UP (ref 96–108)
CO2 SERPL-SCNC: 22 MMOL/L — SIGNIFICANT CHANGE UP (ref 22–29)
CREAT SERPL-MCNC: 1.84 MG/DL — HIGH (ref 0.5–1.3)
EGFR: 38 ML/MIN/1.73M2 — LOW
EOSINOPHIL # BLD AUTO: 0.09 K/UL — SIGNIFICANT CHANGE UP (ref 0–0.5)
EOSINOPHIL NFR BLD AUTO: 0.9 % — SIGNIFICANT CHANGE UP (ref 0–6)
GLUCOSE SERPL-MCNC: 85 MG/DL — SIGNIFICANT CHANGE UP (ref 70–99)
HCT VFR BLD CALC: 40.2 % — SIGNIFICANT CHANGE UP (ref 39–50)
HGB BLD-MCNC: 14 G/DL — SIGNIFICANT CHANGE UP (ref 13–17)
INR BLD: 1.3 RATIO — HIGH (ref 0.85–1.18)
LYMPHOCYTES # BLD AUTO: 1.53 K/UL — SIGNIFICANT CHANGE UP (ref 1–3.3)
LYMPHOCYTES # BLD AUTO: 14.9 % — SIGNIFICANT CHANGE UP (ref 13–44)
MANUAL SMEAR VERIFICATION: SIGNIFICANT CHANGE UP
MCHC RBC-ENTMCNC: 29.4 PG — SIGNIFICANT CHANGE UP (ref 27–34)
MCHC RBC-ENTMCNC: 34.8 GM/DL — SIGNIFICANT CHANGE UP (ref 32–36)
MCV RBC AUTO: 84.3 FL — SIGNIFICANT CHANGE UP (ref 80–100)
MICROCYTES BLD QL: SLIGHT — SIGNIFICANT CHANGE UP
MONOCYTES # BLD AUTO: 0.81 K/UL — SIGNIFICANT CHANGE UP (ref 0–0.9)
MONOCYTES NFR BLD AUTO: 7.9 % — SIGNIFICANT CHANGE UP (ref 2–14)
NEUTROPHILS # BLD AUTO: 7.21 K/UL — SIGNIFICANT CHANGE UP (ref 1.8–7.4)
NEUTROPHILS NFR BLD AUTO: 70.2 % — SIGNIFICANT CHANGE UP (ref 43–77)
PLAT MORPH BLD: NORMAL — SIGNIFICANT CHANGE UP
PLATELET # BLD AUTO: 179 K/UL — SIGNIFICANT CHANGE UP (ref 150–400)
POLYCHROMASIA BLD QL SMEAR: SLIGHT — SIGNIFICANT CHANGE UP
POTASSIUM SERPL-MCNC: 3.9 MMOL/L — SIGNIFICANT CHANGE UP (ref 3.5–5.3)
POTASSIUM SERPL-SCNC: 3.9 MMOL/L — SIGNIFICANT CHANGE UP (ref 3.5–5.3)
PROT SERPL-MCNC: 6.2 G/DL — LOW (ref 6.6–8.7)
PROTHROM AB SERPL-ACNC: 14.3 SEC — HIGH (ref 9.5–13)
RBC # BLD: 4.77 M/UL — SIGNIFICANT CHANGE UP (ref 4.2–5.8)
RBC # FLD: 13.2 % — SIGNIFICANT CHANGE UP (ref 10.3–14.5)
RBC BLD AUTO: ABNORMAL
SODIUM SERPL-SCNC: 140 MMOL/L — SIGNIFICANT CHANGE UP (ref 135–145)
VARIANT LYMPHS # BLD: 5.2 % — SIGNIFICANT CHANGE UP (ref 0–6)
WBC # BLD: 10.27 K/UL — SIGNIFICANT CHANGE UP (ref 3.8–10.5)
WBC # FLD AUTO: 10.27 K/UL — SIGNIFICANT CHANGE UP (ref 3.8–10.5)

## 2023-08-19 PROCEDURE — 99284 EMERGENCY DEPT VISIT MOD MDM: CPT | Mod: 25

## 2023-08-19 PROCEDURE — 85025 COMPLETE CBC W/AUTO DIFF WBC: CPT

## 2023-08-19 PROCEDURE — 96374 THER/PROPH/DIAG INJ IV PUSH: CPT

## 2023-08-19 PROCEDURE — 99284 EMERGENCY DEPT VISIT MOD MDM: CPT

## 2023-08-19 PROCEDURE — 36415 COLL VENOUS BLD VENIPUNCTURE: CPT

## 2023-08-19 PROCEDURE — 85730 THROMBOPLASTIN TIME PARTIAL: CPT

## 2023-08-19 PROCEDURE — 96375 TX/PRO/DX INJ NEW DRUG ADDON: CPT

## 2023-08-19 PROCEDURE — 93010 ELECTROCARDIOGRAM REPORT: CPT

## 2023-08-19 PROCEDURE — 80053 COMPREHEN METABOLIC PANEL: CPT

## 2023-08-19 PROCEDURE — 85610 PROTHROMBIN TIME: CPT

## 2023-08-19 PROCEDURE — 93005 ELECTROCARDIOGRAM TRACING: CPT

## 2023-08-19 RX ORDER — MECLIZINE HCL 12.5 MG
50 TABLET ORAL ONCE
Refills: 0 | Status: COMPLETED | OUTPATIENT
Start: 2023-08-19 | End: 2023-08-19

## 2023-08-19 RX ORDER — METOCLOPRAMIDE HCL 10 MG
1 TABLET ORAL
Qty: 4 | Refills: 0
Start: 2023-08-19 | End: 2023-08-22

## 2023-08-19 RX ORDER — METOCLOPRAMIDE HCL 10 MG
10 TABLET ORAL ONCE
Refills: 0 | Status: COMPLETED | OUTPATIENT
Start: 2023-08-19 | End: 2023-08-19

## 2023-08-19 RX ORDER — ACETAMINOPHEN 500 MG
3 TABLET ORAL
Qty: 60 | Refills: 0
Start: 2023-08-19 | End: 2023-08-23

## 2023-08-19 RX ORDER — ACETAMINOPHEN 500 MG
1000 TABLET ORAL ONCE
Refills: 0 | Status: COMPLETED | OUTPATIENT
Start: 2023-08-19 | End: 2023-08-19

## 2023-08-19 RX ADMIN — Medication 400 MILLIGRAM(S): at 18:30

## 2023-08-19 RX ADMIN — Medication 10 MILLIGRAM(S): at 18:31

## 2023-08-19 NOTE — ED PROVIDER NOTE - OBJECTIVE STATEMENT
73-year-old male; PMH significant for CAD (status post stents), HTN, HLD, NSVT, TIA; now presenting with headache frontal pressure associated with bilateral ear congestion and decreased hearing.  Denies any tinnitus.  Complaining of some dizziness with movement.  Reports it is a mixture of spinning and lightheaded sensation.  Denies chest pain, shortness of breath, palpitations.  Denies abdominal pain.  Denies nausea or vomiting.  Denies numbness or tingling.  Denies focal weakness.

## 2023-08-19 NOTE — ED PROVIDER NOTE - CLINICAL SUMMARY MEDICAL DECISION MAKING FREE TEXT BOX
84yoF; with recent admission for CAD nstemi s/p cath, found to have moderate AS (staging for TAVR soon), pAFib (on Eliquis), COPD(on home O2, nocturnal bipap); now p/w sob. patient states she became suddenly sob this morning.  placed herself on her own bipap, and then placed on CPAP by EMS, given Decadron 10mg IM and 1 neb.  denies f/c/s. denies n/v. c/o chronic cough.  denies abd pain. denies n/v.  plan for labs and CT. signed out pending results and re-eval. Patient signed out to incoming physician.  All decisions regarding the progression of care will be made at their discretion.

## 2023-08-19 NOTE — ED PROVIDER NOTE - PHYSICAL EXAMINATION
Gen: Alert, NAD  Head: NC, AT, PERRL, EOMI, normal lids/conjunctiva  ENT: B TM WNL  Neck: +supple, no tenderness/meningismus/JVD, +Trachea midline  Pulm: Bilateral BS, normal resp effort, no wheeze/stridor/retractions  CV: RRR, no M/R/G, 2+dist pulses  Abd: soft, NT/ND, +BS, no hepatosplenomegaly  Mskel: ROM intact x4 extremities.  no edema/erythema/cyanosis  Skin: no rash, warm, dry  Neuro: AAOx3, no sensory/motor deficits, CN 2-12 intact

## 2023-08-19 NOTE — ED ADULT NURSE NOTE - OBJECTIVE STATEMENT
pt alert and oriented x4 comes in c/o head pressure, HA. dizziness off balance for 5 days.  on 2 abx and steroids not getting better

## 2023-08-19 NOTE — ED ADULT NURSE NOTE - NSFALLUNIVINTERV_ED_ALL_ED
Bed/Stretcher in lowest position, wheels locked, appropriate side rails in place/Call bell, personal items and telephone in reach/Instruct patient to call for assistance before getting out of bed/chair/stretcher/Non-slip footwear applied when patient is off stretcher/[place] to call system/Physically safe environment - no spills, clutter or unnecessary equipment/Purposeful proactive rounding/Room/bathroom lighting operational, light cord in reach

## 2023-08-19 NOTE — ED PROVIDER NOTE - PROGRESS NOTE DETAILS
pt states he feels better, relief of all symptoms at this time, neuro intact, pt doesn't want to wait for CTH at this time states he had recent MRI head which was normal   A&Ox3, moving all extremities symmetrically, follows commands, motor celena upper and lower ext 5/5, sensory symm and intact CN 2-12 grossly intact, no ataxia, no nystagmus, no dysmetria, no ddk, symm celena, no pronator drift  pt stable for dc with follow up, encouraged to stay for CTH but stsating he will follow up and wife states he is at baseline and stable for dc

## 2023-08-19 NOTE — ED ADULT NURSE REASSESSMENT NOTE - NS ED NURSE REASSESS COMMENT FT1
received report from day RN, assumed care of pt at change of shift.  pt is AOX3, pt reports slight improvement in sinus pressure.  wife at bedside.  no s/s acute distress noted.  pt awaiting CT scan. wife at bedside

## 2023-08-19 NOTE — ED PROVIDER NOTE - PATIENT PORTAL LINK FT
Ht: 5'51/2\"  Wt: 56.7kg  BMI:  21    Any open wounds or sores or rashes? Denies    Pt  Instructed on:  Bowel prep:4 dulcolax laxatuves at 1500 4/13/22 gatorade miralax at 1700 4/13/22   NPO after midnight 4/13/22  No jewelry, valuables.   Pt advised regarding possible pre-payment of out of pocket expenses to be collected at time of admission - pt given [phone] to call if she has questions regarding pre-payment.  Bring insurance card(s).  No artificial fingernails or dark fingernail polish.  No body piercings.  Pt advised to have someone home with her after the procedure.  Pt verbalizes understanding.    Ok to speak with  regarding discharge instructions?  Yes      .PAT Patient Instructions Given:  • What to expect when you arrive at the facility  o Temperature screening upon arrival for patient and visitor  o Universal masking, mask must be worn and stay on  • Visitor must also wear a mask  • If you have a mask, please wear it.  One mask per person if you do not already have one.  o Maintain social distancing  • Maintain 6 feet of distance between you and other patient/visitors and staff  • Please don't move any furniture in the waiting areas  • Please be aware of and follow any sign or markings that will help ensure appropriate social distancing     • Instructions for visitor/  o Limit of 1 visitor per patient  o Visitors may want bring something to drink if the wait is expected to be lengthy  • Access to beverages may be limited  o Reading materials (magazines & newspapers) are not provided in the waiting areas.  TV will be on.   o Visitors may wait in car but need to provide phone number so they can be contacted when procedure is finished      • Patient aware of need for pre-procedure covid testing and will quarantine following test until procedure.  • Patient advised to contact surgeon if symptoms develop or have known exposure prior to procedure.    Patient verbalizes understanding   
You can access the FollowMyHealth Patient Portal offered by [facility] by registering at the following website: [email or web address]. By joining [facility] FollowMyHealth portal, you will also be able to view your health information using other applications (apps) compatible with our system.

## 2023-08-19 NOTE — ED ADULT TRIAGE NOTE - CHIEF COMPLAINT QUOTE
c/O head pressure, difficulty hearing in both ears and balance issues for the past 2 weeks. Was diagnosed with a sinus infection and completed 2 abx but symptoms still persist.

## 2023-08-19 NOTE — ED PROVIDER NOTE - NS ED ROS FT
Constitutional: (-) fever  (-)chills  (-)sweats  Eyes/ENT: +hearing deficit   Cardiovascular: (-) chest pain, (-) palpitations (-) edema   Respiratory: (-) cough, (-) shortness of breath   Gastrointestinal: (-)nausea  (-)vomiting, (-) diarrhea  (-) abdominal pain   :  (-)dysuria, (-)frequency, (-)urgency, (-)hematuria  Musculoskeletal: (-) neck pain, (-) back pain, (-) joint pain  Integumentary: (-) rash, (-) edema  Neurological: (+headache, (-) altered mental status  (-)LOC

## 2023-08-19 NOTE — ED ADULT NURSE NOTE - NSICDXPASTMEDICALHX_GEN_ALL_CORE_FT
PAST MEDICAL HISTORY:  BPH (benign prostatic hyperplasia)     Coronary artery disease involving native coronary artery of native heart with unstable angina pector     COVID-19 vaccine series completed Moderna x 2    [place] (hard of hearing) Understands lip reading and loud, low volume speech. Does not know sign language.    Hypertension     Hypertension     MI (myocardial infarction) may 2010 stents x 2    Mixed hyperlipidemia     NSVT (nonsustained ventricular tachycardia)     Stage 3 chronic kidney disease     TIA (transient ischemic attack)

## 2023-08-19 NOTE — ED PROVIDER NOTE - NSICDXPASTMEDICALHX_GEN_ALL_CORE_FT
PAST MEDICAL HISTORY:  BPH (benign prostatic hyperplasia)     Coronary artery disease involving native coronary artery of native heart with unstable angina pector     COVID-19 vaccine series completed Moderna x 2    [place] (hard of hearing) Understands lip reading and loud, low volume speech. Does not know sign language.    Hypertension     Hypertension     MI (myocardial infarction) may 2010 stents x 2    Mixed hyperlipidemia     NSVT (nonsustained ventricular tachycardia)     Stage 3 chronic kidney disease     TIA (transient ischemic attack)

## 2023-08-20 RX ORDER — METOCLOPRAMIDE HCL 10 MG
1 TABLET ORAL
Qty: 4 | Refills: 0
Start: 2023-08-20 | End: 2023-08-23

## 2023-09-14 ENCOUNTER — OFFICE (OUTPATIENT)
Dept: URBAN - METROPOLITAN AREA CLINIC 12 | Facility: CLINIC | Age: 73
Setting detail: OPHTHALMOLOGY
End: 2023-09-14
Payer: COMMERCIAL

## 2023-09-14 DIAGNOSIS — Z96.1: ICD-10-CM

## 2023-09-14 DIAGNOSIS — H16.223: ICD-10-CM

## 2023-09-14 PROCEDURE — 99024 POSTOP FOLLOW-UP VISIT: CPT | Performed by: OPHTHALMOLOGY

## 2023-09-14 ASSESSMENT — SPHEQUIV_DERIVED
OD_SPHEQUIV: 0.75
OD_SPHEQUIV: 1.75
OS_SPHEQUIV: 0
OS_SPHEQUIV: 0.625

## 2023-09-14 ASSESSMENT — PACHYMETRY
OS_CT_CORRECTION: -1
OS_CT_UM: 554

## 2023-09-14 ASSESSMENT — REFRACTION_MANIFEST
OS_AXIS: 003
OD_CYLINDER: -0.50
OS_SPHERE: +1.00
OD_VA1: 20/20
OS_VA1: 20/20
OD_AXIS: 146
OS_CYLINDER: -0.75
OS_ADD: +2.50
OD_SPHERE: +2.00
OD_ADD: +2.50

## 2023-09-14 ASSESSMENT — SUPERFICIAL PUNCTATE KERATITIS (SPK)
OD_SPK: T
OS_SPK: T

## 2023-09-14 ASSESSMENT — REFRACTION_CURRENTRX
OS_SPHERE: +1.25
OD_OVR_VA: 20/
OS_CYLINDER: -1.00
OD_CYLINDER: -0.75
OD_VPRISM_DIRECTION: PROGS
OS_ADD: +3.00
OD_CYLINDER: -1.00
OS_VPRISM_DIRECTION: PROGS
OD_SPHERE: +1.25
OD_VPRISM_DIRECTION: PROGS
OD_AXIS: 170
OD_ADD: +3.00
OS_AXIS: 175
OD_AXIS: 146
OD_SPHERE: +1.50
OD_OVR_VA: 20/
OS_VPRISM_DIRECTION: PROGS
OS_SPHERE: +1.25
OS_OVR_VA: 20/
OS_CYLINDER: -1.25
OS_OVR_VA: 20/
OS_AXIS: 178
OS_ADD: +2.25
OD_ADD: +2.25

## 2023-09-14 ASSESSMENT — REFRACTION_AUTOREFRACTION
OD_AXIS: 119
OS_CYLINDER: -1.50
OD_CYLINDER: -1.50
OD_SPHERE: +1.50
OS_SPHERE: +0.75
OS_AXIS: 118

## 2023-09-14 ASSESSMENT — AXIALLENGTH_DERIVED
OD_AL: 23.9478
OS_AL: 23.8237
OD_AL: 23.553
OS_AL: 23.578

## 2023-09-14 ASSESSMENT — KERATOMETRY
OD_K1POWER_DIOPTERS: 41.25
OS_K1POWER_DIOPTERS: 42.50
METHOD_AUTO_MANUAL: AUTO
OD_K2POWER_DIOPTERS: 42.25
OS_K2POWER_DIOPTERS: 43.25
OS_AXISANGLE_DEGREES: 035
OD_AXISANGLE_DEGREES: 030

## 2023-09-14 ASSESSMENT — LID EXAM ASSESSMENTS
OS_COMMENTS: INCISION INTACT
OD_COMMENTS: INCISION INTACT

## 2023-09-14 ASSESSMENT — TONOMETRY
OD_IOP_MMHG: 14
OS_IOP_MMHG: 12

## 2023-09-14 ASSESSMENT — CONFRONTATIONAL VISUAL FIELD TEST (CVF)
OD_FINDINGS: FULL
OS_FINDINGS: FULL

## 2023-09-14 ASSESSMENT — CORNEAL DYSTROPHY - POSTERIOR
OS_POSTERIORDYSTROPHY: 2+ GUTTATA
OD_POSTERIORDYSTROPHY: 2+ GUTTATA

## 2023-09-14 ASSESSMENT — VISUAL ACUITY
OS_BCVA: 20/25-1
OD_BCVA: 20/30

## 2023-09-14 ASSESSMENT — CORNEAL EDEMA CLINICAL DESCRIPTION: OS_CORNEALEDEMA: T

## 2023-11-10 NOTE — ASU PATIENT PROFILE, ADULT - HEALTHCARE INFORMATION NEEDED, PROFILE
Pt discharged to home. Heplock and monitor removed. Discharge instructions given and explained to pt. Pt verbalized understanding. Pt denies any questions or concerns. Pt assisted to front door in wheelchair with all belongings. Hard of hearing, bilat ears, no hearing aids. Pt reads lips.

## 2023-11-28 ENCOUNTER — OFFICE (OUTPATIENT)
Dept: URBAN - METROPOLITAN AREA CLINIC 12 | Facility: CLINIC | Age: 73
Setting detail: OPHTHALMOLOGY
End: 2023-11-28
Payer: COMMERCIAL

## 2023-11-28 VITALS — HEIGHT: 60 IN

## 2023-11-28 DIAGNOSIS — H16.223: ICD-10-CM

## 2023-11-28 DIAGNOSIS — H18.513: ICD-10-CM

## 2023-11-28 DIAGNOSIS — H43.811: ICD-10-CM

## 2023-11-28 DIAGNOSIS — H43.393: ICD-10-CM

## 2023-11-28 DIAGNOSIS — H40.013: ICD-10-CM

## 2023-11-28 PROCEDURE — 92083 EXTENDED VISUAL FIELD XM: CPT | Performed by: OPHTHALMOLOGY

## 2023-11-28 PROCEDURE — 92012 INTRM OPH EXAM EST PATIENT: CPT | Performed by: OPHTHALMOLOGY

## 2023-11-28 PROCEDURE — 92250 FUNDUS PHOTOGRAPHY W/I&R: CPT | Performed by: OPHTHALMOLOGY

## 2023-11-28 ASSESSMENT — REFRACTION_CURRENTRX
OS_CYLINDER: -1.00
OD_OVR_VA: 20/
OS_ADD: +2.25
OS_AXIS: 178
OD_SPHERE: +1.25
OS_CYLINDER: -1.25
OS_SPHERE: +1.25
OD_AXIS: 146
OD_ADD: +3.00
OD_OVR_VA: 20/
OD_VPRISM_DIRECTION: PROGS
OD_CYLINDER: -1.00
OS_OVR_VA: 20/
OD_ADD: +2.25
OS_OVR_VA: 20/
OS_SPHERE: +1.25
OD_VPRISM_DIRECTION: PROGS
OD_AXIS: 170
OD_SPHERE: +1.50
OS_VPRISM_DIRECTION: PROGS
OS_AXIS: 175
OD_CYLINDER: -0.75
OS_VPRISM_DIRECTION: PROGS
OS_ADD: +3.00

## 2023-11-28 ASSESSMENT — LID EXAM ASSESSMENTS
OD_COMMENTS: INCISION INTACT
OS_COMMENTS: INCISION INTACT

## 2023-11-28 ASSESSMENT — REFRACTION_MANIFEST
OS_SPHERE: +1.00
OS_AXIS: 003
OD_ADD: +2.50
OS_VA1: 20/20
OD_SPHERE: +2.00
OS_CYLINDER: -0.75
OD_CYLINDER: -0.50
OD_VA1: 20/20
OS_ADD: +2.50
OD_AXIS: 146

## 2023-11-28 ASSESSMENT — SPHEQUIV_DERIVED
OS_SPHEQUIV: 0.625
OS_SPHEQUIV: -0.5
OD_SPHEQUIV: 0.25
OD_SPHEQUIV: 1.75

## 2023-11-28 ASSESSMENT — CORNEAL EDEMA CLINICAL DESCRIPTION: OS_CORNEALEDEMA: T

## 2023-11-28 ASSESSMENT — REFRACTION_AUTOREFRACTION
OD_SPHERE: +0.75
OD_CYLINDER: -1.00
OD_AXIS: 113
OS_CYLINDER: -1.50
OS_AXIS: 129
OS_SPHERE: +0.25

## 2023-11-28 ASSESSMENT — CORNEAL DYSTROPHY - POSTERIOR
OS_POSTERIORDYSTROPHY: 2+ GUTTATA
OD_POSTERIORDYSTROPHY: 2+ GUTTATA

## 2023-11-28 ASSESSMENT — SUPERFICIAL PUNCTATE KERATITIS (SPK)
OS_SPK: T
OD_SPK: T

## 2023-11-28 ASSESSMENT — CONFRONTATIONAL VISUAL FIELD TEST (CVF)
OD_FINDINGS: FULL
OS_FINDINGS: FULL

## 2024-01-09 PROBLEM — Q12.0: Status: ACTIVE | Noted: 2024-01-09

## 2024-02-23 ENCOUNTER — RX ONLY (RX ONLY)
Age: 74
End: 2024-02-23

## 2024-02-23 ENCOUNTER — OFFICE (OUTPATIENT)
Dept: URBAN - METROPOLITAN AREA CLINIC 12 | Facility: CLINIC | Age: 74
Setting detail: OPHTHALMOLOGY
End: 2024-02-23
Payer: COMMERCIAL

## 2024-02-23 DIAGNOSIS — H40.013: ICD-10-CM

## 2024-02-23 DIAGNOSIS — H16.223: ICD-10-CM

## 2024-02-23 DIAGNOSIS — H43.393: ICD-10-CM

## 2024-02-23 DIAGNOSIS — H43.811: ICD-10-CM

## 2024-02-23 DIAGNOSIS — H18.513: ICD-10-CM

## 2024-02-23 PROCEDURE — 99212 OFFICE O/P EST SF 10 MIN: CPT | Performed by: OPHTHALMOLOGY

## 2024-02-23 ASSESSMENT — REFRACTION_CURRENTRX
OD_CYLINDER: -0.75
OS_CYLINDER: -1.00
OD_VPRISM_DIRECTION: PROGS
OD_ADD: +3.00
OS_SPHERE: +1.25
OD_ADD: +2.25
OS_OVR_VA: 20/
OS_ADD: +3.00
OS_OVR_VA: 20/
OS_SPHERE: +1.25
OS_ADD: +2.25
OS_CYLINDER: -1.25
OD_CYLINDER: -1.00
OS_VPRISM_DIRECTION: PROGS
OS_VPRISM_DIRECTION: PROGS
OD_SPHERE: +1.50
OD_OVR_VA: 20/
OD_VPRISM_DIRECTION: PROGS
OS_AXIS: 175
OD_AXIS: 146
OS_AXIS: 178
OD_AXIS: 170
OD_OVR_VA: 20/
OD_SPHERE: +1.25

## 2024-02-23 ASSESSMENT — LID EXAM ASSESSMENTS
OS_COMMENTS: INCISION INTACT
OD_COMMENTS: INCISION INTACT

## 2024-02-23 ASSESSMENT — REFRACTION_AUTOREFRACTION
OS_SPHERE: PLANO
OD_SPHERE: +0.50
OS_CYLINDER: -1.00
OS_AXIS: 128
OD_CYLINDER: -0.75
OD_AXIS: 146

## 2024-02-23 ASSESSMENT — SPHEQUIV_DERIVED: OD_SPHEQUIV: 0.125

## 2024-02-23 ASSESSMENT — CONFRONTATIONAL VISUAL FIELD TEST (CVF)
OD_FINDINGS: FULL
OS_FINDINGS: FULL

## 2024-02-23 ASSESSMENT — SUPERFICIAL PUNCTATE KERATITIS (SPK)
OS_SPK: T
OD_SPK: T

## 2024-02-23 ASSESSMENT — CORNEAL DYSTROPHY - POSTERIOR
OD_POSTERIORDYSTROPHY: 2+ GUTTATA
OS_POSTERIORDYSTROPHY: 2+ GUTTATA

## 2024-02-23 ASSESSMENT — CORNEAL EDEMA CLINICAL DESCRIPTION: OS_CORNEALEDEMA: T 1+

## 2024-03-26 ENCOUNTER — APPOINTMENT (OUTPATIENT)
Dept: CARDIOLOGY | Facility: CLINIC | Age: 74
End: 2024-03-26
Payer: COMMERCIAL

## 2024-03-26 VITALS
HEART RATE: 66 BPM | DIASTOLIC BLOOD PRESSURE: 62 MMHG | WEIGHT: 205 LBS | HEIGHT: 69 IN | OXYGEN SATURATION: 100 % | SYSTOLIC BLOOD PRESSURE: 130 MMHG | BODY MASS INDEX: 30.36 KG/M2

## 2024-03-26 DIAGNOSIS — R00.2 PALPITATIONS: ICD-10-CM

## 2024-03-26 DIAGNOSIS — I25.10 ATHEROSCLEROTIC HEART DISEASE OF NATIVE CORONARY ARTERY W/OUT ANGINA PECTORIS: ICD-10-CM

## 2024-03-26 DIAGNOSIS — I10 ESSENTIAL (PRIMARY) HYPERTENSION: ICD-10-CM

## 2024-03-26 PROCEDURE — 99214 OFFICE O/P EST MOD 30 MIN: CPT | Mod: 25

## 2024-03-26 PROCEDURE — 93000 ELECTROCARDIOGRAM COMPLETE: CPT

## 2024-03-26 RX ORDER — CARBIDOPA AND LEVODOPA 25; 100 MG/1; MG/1
25-100 TABLET ORAL 3 TIMES DAILY
Refills: 0 | Status: ACTIVE | COMMUNITY

## 2024-03-26 RX ORDER — HYDROCORTISONE 25 MG/G
2.5 CREAM TOPICAL
Qty: 30 | Refills: 3 | Status: DISCONTINUED | COMMUNITY
Start: 2021-03-17 | End: 2024-03-26

## 2024-03-26 RX ORDER — SILDENAFIL 50 MG/1
50 TABLET ORAL
Qty: 6 | Refills: 0 | Status: DISCONTINUED | COMMUNITY
Start: 2022-06-07 | End: 2024-03-26

## 2024-03-26 RX ORDER — CLINDAMYCIN PHOSPHATE 10 MG/ML
1 SOLUTION TOPICAL
Qty: 60 | Refills: 0 | Status: DISCONTINUED | COMMUNITY
Start: 2022-07-02 | End: 2024-03-26

## 2024-03-26 NOTE — DISCUSSION/SUMMARY
[FreeTextEntry1] : \par  \par  \par  \par  \par  \par   [EKG obtained to assist in diagnosis and management of assessed problem(s)] : EKG obtained to assist in diagnosis and management of assessed problem(s)

## 2024-03-26 NOTE — HISTORY OF PRESENT ILLNESS
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  Ziopatch as ordered and showed 2 runs VT, longest 25 beat. Pt was instructed to increase to Metoprolol 50 mg daily, which he did for approx 5 days. He had reported fatigue. Yesterday was told to decrease Toprol back down to 25 mg daily. He reported jaw pain and was advised to come in today for evaluation. The pt states he had  a "sensation in his jaw"/  " felt the outline of his bottom jaw" on Sunday and again yesterday. Yesterday he took Tylenol with relief. The jaw pain/ sensation fully resolved ans has not recurred.   He reports  feeling brief episodes of palpitations daily mostly felt with activity, but at times felt while seated He typically has no associated symptoms but he has had 2 episodes where he felt lightheaded, like a "rush throughout the body" and needed to sit down He denies any nausea, diaphoresis, SOB, syncope or falls  He has never had chest pain aside from when he had an MI in 2010 HIs wife states that his blood pressure yesterday was low for him after the episode of palpitations, SBP was 120 Today he feels great and wants to go to [facility] Tuesday at a local restaurant   2/8/2023 Returns for follow up as he need preoperative risk stratification prior to carpal tunnel surgery and trigger finger surgery  feels well.  Catheterization in 2/22 , showed patent LAD stent and mild disease otherwise -141 mmHg and 70 diastolic at home  He exercises by walking and bicycle and has no exertional chest pain or  SOB, palpitations, dizziness or syncope  He will have palpitations if he doesn't take his medicine   3/26/2024 Returns for follow up . has been diagnosed with Parkinsonism and has been started on Carbidopa/levodopa and improves ( had weakness and was unable to wake )  no exertional chest pain or  SOB, , dizziness or syncope occasional palpitations on exeriton

## 2024-03-26 NOTE — PHYSICAL EXAM
[Normal] : clear lung fields, good air entry, no respiratory distress [Normal Gait] : normal gait [No Cyanosis] : no cyanosis [No Edema] : no edema [No Clubbing] : no clubbing [No Rash] : no rash [Moves all extremities] : moves all extremities [No Focal Deficits] : no focal deficits [Normal Speech] : normal speech [Alert and Oriented] : alert and oriented [de-identified] : right carotid bruit

## 2024-03-26 NOTE — REVIEW OF SYSTEMS
[Weakness] : weakness [Fever] : no fever [Chills] : no chills [Feeling Fatigued] : not feeling fatigued [Dyspnea on exertion] : not dyspnea during exertion [SOB] : no shortness of breath [Chest Discomfort] : no chest discomfort [Leg Claudication] : no intermittent leg claudication [Lower Ext Edema] : no extremity edema [Orthopnea] : no orthopnea [Palpitations] : no palpitations [PND] : no PND [Syncope] : no syncope [Coughing Up Blood] : no hemoptysis [Cough] : no cough [Vomiting] : no vomiting [Nausea] : no nausea [Blood in stool] : no blood in stoo [Joint Pain] : no joint pain [Hematuria] : no hematuria [Myalgia] : no myalgia [Rash] : no rash [Dizziness] : no dizziness [Convulsions] : no convulsions [Confusion] : no confusion was observed [Easy Bleeding] : no tendency for easy bleeding [Easy Bruising] : no tendency for easy bruising

## 2024-03-26 NOTE — ASSESSMENT
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  Ziopatch as ordered and showed 2 runs VT, longest 25 beat. Pt was instructed to increase to Metoprolol 50 mg daily, which he did for approx 5 days. He had reported fatigue. Yesterday was told to decrease Toprol back down to 25 mg daily. He reported jaw pain and was advised to come in today for evaluation. -Jaw pain fully resolved with Tylenol. Pt feels great today. EKG without any acute ST T changes  1. CAD, LAD stent 2010, palpitations, 2 runs VT on Zio monitor, longest 25 beat Patent LAD stent - IToprol 50 mg daily, -continue ASA  2.  HTN - Toprol  50 mg daily  - diet modification discussed, advised to lower his sodium intake, avoid hot dogs    4- Hyperlipidemia on atorvastation 20 mg, goal LDL < 70 mg/dl , follow with a PCP

## 2024-03-27 ENCOUNTER — NON-APPOINTMENT (OUTPATIENT)
Age: 74
End: 2024-03-27

## 2024-04-27 ENCOUNTER — OFFICE (OUTPATIENT)
Dept: URBAN - METROPOLITAN AREA CLINIC 12 | Facility: CLINIC | Age: 74
Setting detail: OPHTHALMOLOGY
End: 2024-04-27
Payer: COMMERCIAL

## 2024-04-27 DIAGNOSIS — H40.013: ICD-10-CM

## 2024-04-27 DIAGNOSIS — H43.811: ICD-10-CM

## 2024-04-27 DIAGNOSIS — H43.393: ICD-10-CM

## 2024-04-27 DIAGNOSIS — H18.513: ICD-10-CM

## 2024-04-27 DIAGNOSIS — H16.223: ICD-10-CM

## 2024-04-27 PROCEDURE — 92012 INTRM OPH EXAM EST PATIENT: CPT | Performed by: OPHTHALMOLOGY

## 2024-04-27 ASSESSMENT — LID EXAM ASSESSMENTS
OD_COMMENTS: INCISION INTACT
OS_COMMENTS: INCISION INTACT

## 2024-05-01 ENCOUNTER — OFFICE (OUTPATIENT)
Dept: URBAN - METROPOLITAN AREA CLINIC 6 | Facility: CLINIC | Age: 74
Setting detail: OPHTHALMOLOGY
End: 2024-05-01
Payer: COMMERCIAL

## 2024-05-01 ENCOUNTER — RX ONLY (RX ONLY)
Age: 74
End: 2024-05-01

## 2024-05-01 DIAGNOSIS — H02.34: ICD-10-CM

## 2024-05-01 DIAGNOSIS — H16.223: ICD-10-CM

## 2024-05-01 DIAGNOSIS — H02.831: ICD-10-CM

## 2024-05-01 DIAGNOSIS — H52.7: ICD-10-CM

## 2024-05-01 DIAGNOSIS — Z96.1: ICD-10-CM

## 2024-05-01 DIAGNOSIS — H02.31: ICD-10-CM

## 2024-05-01 DIAGNOSIS — H18.513: ICD-10-CM

## 2024-05-01 DIAGNOSIS — H02.834: ICD-10-CM

## 2024-05-01 PROCEDURE — 99214 OFFICE O/P EST MOD 30 MIN: CPT | Performed by: OPHTHALMOLOGY

## 2024-05-01 PROCEDURE — 76514 ECHO EXAM OF EYE THICKNESS: CPT | Performed by: OPHTHALMOLOGY

## 2024-05-01 ASSESSMENT — CONFRONTATIONAL VISUAL FIELD TEST (CVF)
OD_FINDINGS: FULL
OS_FINDINGS: FULL

## 2024-05-01 ASSESSMENT — LID EXAM ASSESSMENTS
OS_COMMENTS: INCISION INTACT
OD_COMMENTS: INCISION INTACT

## 2024-05-17 NOTE — ED PROVIDER NOTE - OBJECTIVE STATEMENT
numbness and pulsating sensation followed by numbness to right side of his temple down to upper cheek.  around 3pm. Pt. had no headache/no dizziness. Pt. felt sluggish. NO change in speech. NO change in vision.  NO weakness to arms or legs. Pt. did have chest pain that last seconds.  Pt. had similar episode of pulsating intermittently without the numbness throughout the week. Pt. currently still has decrease sensation along Right upper side of the face. No pain.
Yes

## 2024-06-25 ENCOUNTER — OFFICE (OUTPATIENT)
Dept: URBAN - METROPOLITAN AREA CLINIC 6 | Facility: CLINIC | Age: 74
Setting detail: OPHTHALMOLOGY
End: 2024-06-25
Payer: COMMERCIAL

## 2024-06-25 DIAGNOSIS — H18.512: ICD-10-CM

## 2024-06-25 PROBLEM — H40.033: Status: ACTIVE | Noted: 2024-06-25

## 2024-06-25 PROCEDURE — 66761 REVISION OF IRIS: CPT | Mod: LT | Performed by: OPHTHALMOLOGY

## 2024-06-25 ASSESSMENT — LID EXAM ASSESSMENTS
OS_COMMENTS: INCISION INTACT
OD_COMMENTS: INCISION INTACT

## 2024-06-25 ASSESSMENT — CONFRONTATIONAL VISUAL FIELD TEST (CVF)
OD_FINDINGS: FULL
OS_FINDINGS: FULL

## 2024-07-12 ENCOUNTER — APPOINTMENT (OUTPATIENT)
Dept: CARDIOLOGY | Facility: CLINIC | Age: 74
End: 2024-07-12
Payer: COMMERCIAL

## 2024-07-12 ENCOUNTER — NON-APPOINTMENT (OUTPATIENT)
Age: 74
End: 2024-07-12

## 2024-07-12 VITALS
OXYGEN SATURATION: 99 % | SYSTOLIC BLOOD PRESSURE: 122 MMHG | HEART RATE: 63 BPM | BODY MASS INDEX: 30.81 KG/M2 | HEIGHT: 69 IN | WEIGHT: 208 LBS | DIASTOLIC BLOOD PRESSURE: 56 MMHG

## 2024-07-12 DIAGNOSIS — I35.1 NONRHEUMATIC AORTIC (VALVE) INSUFFICIENCY: ICD-10-CM

## 2024-07-12 DIAGNOSIS — R01.1 CARDIAC MURMUR, UNSPECIFIED: ICD-10-CM

## 2024-07-12 DIAGNOSIS — I51.9 HEART DISEASE, UNSPECIFIED: ICD-10-CM

## 2024-07-12 PROCEDURE — 99214 OFFICE O/P EST MOD 30 MIN: CPT | Mod: 25

## 2024-07-12 PROCEDURE — 93000 ELECTROCARDIOGRAM COMPLETE: CPT

## 2024-07-19 ENCOUNTER — APPOINTMENT (OUTPATIENT)
Dept: CARDIOLOGY | Facility: CLINIC | Age: 74
End: 2024-07-19
Payer: COMMERCIAL

## 2024-07-19 ENCOUNTER — NON-APPOINTMENT (OUTPATIENT)
Age: 74
End: 2024-07-19

## 2024-07-19 PROBLEM — I35.1 SEVERE AORTIC VALVE REGURGITATION: Status: ACTIVE | Noted: 2024-07-19

## 2024-07-19 PROCEDURE — 93306 TTE W/DOPPLER COMPLETE: CPT

## 2024-07-23 ENCOUNTER — OFFICE (OUTPATIENT)
Dept: URBAN - METROPOLITAN AREA CLINIC 6 | Facility: CLINIC | Age: 74
Setting detail: OPHTHALMOLOGY
End: 2024-07-23
Payer: COMMERCIAL

## 2024-07-23 DIAGNOSIS — H43.393: ICD-10-CM

## 2024-07-23 DIAGNOSIS — H40.013: ICD-10-CM

## 2024-07-23 DIAGNOSIS — H18.513: ICD-10-CM

## 2024-07-23 DIAGNOSIS — Z96.1: ICD-10-CM

## 2024-07-23 DIAGNOSIS — H43.811: ICD-10-CM

## 2024-07-23 DIAGNOSIS — H16.223: ICD-10-CM

## 2024-07-23 PROCEDURE — 99213 OFFICE O/P EST LOW 20 MIN: CPT | Performed by: OPHTHALMOLOGY

## 2024-07-23 ASSESSMENT — CONFRONTATIONAL VISUAL FIELD TEST (CVF)
OS_FINDINGS: FULL
OD_FINDINGS: FULL

## 2024-07-23 ASSESSMENT — LID EXAM ASSESSMENTS
OD_COMMENTS: INCISION INTACT
OS_COMMENTS: INCISION INTACT

## 2024-07-30 ENCOUNTER — TRANSCRIPTION ENCOUNTER (OUTPATIENT)
Age: 74
End: 2024-07-30

## 2024-07-30 ENCOUNTER — OUTPATIENT (OUTPATIENT)
Dept: OUTPATIENT SERVICES | Facility: HOSPITAL | Age: 74
LOS: 1 days | End: 2024-07-30
Payer: COMMERCIAL

## 2024-07-30 ENCOUNTER — OUTPATIENT HOSPITAL (OUTPATIENT)
Dept: URBAN - METROPOLITAN AREA CLINIC 33 | Facility: CLINIC | Age: 74
Setting detail: OPHTHALMOLOGY
End: 2024-07-30
Payer: COMMERCIAL

## 2024-07-30 VITALS
WEIGHT: 206.35 LBS | SYSTOLIC BLOOD PRESSURE: 128 MMHG | OXYGEN SATURATION: 99 % | TEMPERATURE: 98 F | HEIGHT: 68 IN | RESPIRATION RATE: 18 BRPM | DIASTOLIC BLOOD PRESSURE: 68 MMHG | HEART RATE: 75 BPM

## 2024-07-30 VITALS
RESPIRATION RATE: 20 BRPM | OXYGEN SATURATION: 96 % | SYSTOLIC BLOOD PRESSURE: 145 MMHG | HEART RATE: 85 BPM | DIASTOLIC BLOOD PRESSURE: 76 MMHG

## 2024-07-30 DIAGNOSIS — Z98.890 OTHER SPECIFIED POSTPROCEDURAL STATES: Chronic | ICD-10-CM

## 2024-07-30 DIAGNOSIS — Z09 ENCOUNTER FOR FOLLOW-UP EXAMINATION AFTER COMPLETED TREATMENT FOR CONDITIONS OTHER THAN MALIGNANT NEOPLASM: Chronic | ICD-10-CM

## 2024-07-30 DIAGNOSIS — Z95.5 PRESENCE OF CORONARY ANGIOPLASTY IMPLANT AND GRAFT: Chronic | ICD-10-CM

## 2024-07-30 DIAGNOSIS — Z87.19 PERSONAL HISTORY OF OTHER DISEASES OF THE DIGESTIVE SYSTEM: Chronic | ICD-10-CM

## 2024-07-30 DIAGNOSIS — H18.512: ICD-10-CM

## 2024-07-30 DIAGNOSIS — Z98.89 OTHER SPECIFIED POSTPROCEDURAL STATES: Chronic | ICD-10-CM

## 2024-07-30 DIAGNOSIS — Z90.89 ACQUIRED ABSENCE OF OTHER ORGANS: Chronic | ICD-10-CM

## 2024-07-30 DIAGNOSIS — Z98.89 UNDEFINED: Chronic | ICD-10-CM

## 2024-07-30 DIAGNOSIS — H18.512 ENDOTHELIAL CORNEAL DYSTROPHY, LEFT EYE: ICD-10-CM

## 2024-07-30 PROCEDURE — 87070 CULTURE OTHR SPECIMN AEROBIC: CPT

## 2024-07-30 PROCEDURE — 88305 TISSUE EXAM BY PATHOLOGIST: CPT | Mod: 26

## 2024-07-30 PROCEDURE — 88312 SPECIAL STAINS GROUP 1: CPT

## 2024-07-30 PROCEDURE — C1889: CPT

## 2024-07-30 PROCEDURE — 65756 CORNEAL TRNSPL ENDOTHELIAL: CPT | Mod: LT

## 2024-07-30 PROCEDURE — 88312 SPECIAL STAINS GROUP 1: CPT | Mod: 26

## 2024-07-30 PROCEDURE — 65756 CORNEAL TRNSPL ENDOTHELIAL: CPT | Mod: 79,LT | Performed by: OPHTHALMOLOGY

## 2024-07-30 PROCEDURE — 88305 TISSUE EXAM BY PATHOLOGIST: CPT

## 2024-07-30 PROCEDURE — 65756 CORNEAL TRNSPL ENDOTHELIAL: CPT | Mod: AS,LT

## 2024-07-30 PROCEDURE — V2785: CPT

## 2024-07-30 DEVICE — GS SF6 SULFER HEXAFLUORIDE 3 L 20GM: Type: IMPLANTABLE DEVICE | Site: LEFT | Status: FUNCTIONAL

## 2024-07-30 NOTE — ASU PREOP CHECKLIST - PATIENT PROBLEMS/NEEDS
S/w pt, at length, in regards to current mid back and Rt arm symptoms. Pt states he forgot to take Enrique for approx 4hrs and began to have Rt generalized hand numbness. Pt denies any specific area or digit that goes numb. Pt states this does not only occur w/ late Enrique doses but with repetitive use. Pt states he also cont to have mid back aching/sharp pain intermit. Pt has EMG sched for 8/13/24 and RN advised pt that the EMG will either confirm radiculopathy or may provide insight as to whether or not there is cervical pathology causing these issues. Pt verbalized understanding and apprec of call.     Pls advise on Rt hand numbness if there is any further advisement to add. Thank you!   Patient expressed no known problems or needs

## 2024-07-30 NOTE — ASU PATIENT PROFILE, ADULT - NSICDXPASTSURGICALHX_GEN_ALL_CORE_FT
PAST SURGICAL HISTORY:  History of ankle surgery left ankle pin    History of hemorrhoids procedure for hemorrhoids in past    History of loop recorder taken out 5 years ago    History of tonsillectomy     S/P appendectomy     S/P excision of lipoma     S/P hernia repair bilateral hernia of groin    S/P umbilical hernia repair, follow-up exam     Status post insertion of drug-eluting stent into left anterior descending (LAD) artery for coronary

## 2024-07-30 NOTE — ASU PATIENT PROFILE, ADULT - FALL HARM RISK - HARM RISK INTERVENTIONS
Assistance with ambulation/Assistance OOB with selected safe patient handling equipment/Communicate Risk of Fall with Harm to all staff/Discuss with provider need for PT consult/Monitor gait and stability/Reinforce activity limits and safety measures with patient and family/Tailored Fall Risk Interventions/Visual Cue: Yellow wristband and red socks/Bed in lowest position, wheels locked, appropriate side rails in place/Call bell, personal items and telephone in reach/Instruct patient to call for assistance before getting out of bed or chair/Non-slip footwear when patient is out of bed/[place] to call system/Physically safe environment - no spills, clutter or unnecessary equipment/Purposeful Proactive Rounding/Room/bathroom lighting operational, light cord in reach

## 2024-07-30 NOTE — ASU DISCHARGE PLAN (ADULT/PEDIATRIC) - ASU DC SPECIAL INSTRUCTIONSFT
Please remain flat on back with face towards ceiling until your follow up appointment tomorrow with Dr. Blanco. No need to remove the patch and shield. Leave it in place. Your doctor will remove it for you tomorrow in the office. You may take tylenol as needed for pain.

## 2024-07-30 NOTE — ASU DISCHARGE PLAN (ADULT/PEDIATRIC) - CARE PROVIDER_API CALL
Inocencia Blanco  Ophthalmology  [address] [place] [id number]  Phone: [phone]  Fax: [phone]  Scheduled Appointment: 07/31/2024 04:45 PM

## 2024-07-30 NOTE — ASU PATIENT PROFILE, ADULT - NSICDXPASTMEDICALHX_GEN_ALL_CORE_FT
PAST MEDICAL HISTORY:  BPH (benign prostatic hyperplasia)     Coronary artery disease involving native coronary artery of native heart with unstable angina pector     COVID-19 vaccine series completed Moderna x 2    H/O Parkinson's disease     [place] (hard of hearing) Understands lip reading and loud, low volume speech. Does not know sign language.    Hypertension     MI (myocardial infarction) may 2010 stents x 2    Mixed hyperlipidemia     NSVT (nonsustained ventricular tachycardia)     Stage 3 chronic kidney disease     TIA (transient ischemic attack)

## 2024-07-31 ENCOUNTER — OFFICE (OUTPATIENT)
Dept: URBAN - METROPOLITAN AREA CLINIC 6 | Facility: CLINIC | Age: 74
Setting detail: OPHTHALMOLOGY
End: 2024-07-31
Payer: COMMERCIAL

## 2024-07-31 DIAGNOSIS — Z94.7: ICD-10-CM

## 2024-07-31 DIAGNOSIS — H11.32: ICD-10-CM

## 2024-07-31 DIAGNOSIS — S05.02XA: ICD-10-CM

## 2024-07-31 PROBLEM — H43.811 POSTERIOR VITREOUS DETACHMENT; RIGHT EYE: Status: ACTIVE | Noted: 2024-07-23

## 2024-07-31 PROBLEM — H18.511 ENDOTHELIAL CORNEAL DYSTROPHY; RIGHT EYE: Status: ACTIVE | Noted: 2024-07-31

## 2024-07-31 PROCEDURE — 99024 POSTOP FOLLOW-UP VISIT: CPT | Performed by: OPHTHALMOLOGY

## 2024-07-31 ASSESSMENT — CONFRONTATIONAL VISUAL FIELD TEST (CVF)
OS_FINDINGS: FULL
OD_FINDINGS: FULL

## 2024-07-31 ASSESSMENT — LID EXAM ASSESSMENTS
OD_COMMENTS: INCISION INTACT
OS_COMMENTS: INCISION INTACT

## 2024-08-06 ENCOUNTER — OFFICE (OUTPATIENT)
Dept: URBAN - METROPOLITAN AREA CLINIC 6 | Facility: CLINIC | Age: 74
Setting detail: OPHTHALMOLOGY
End: 2024-08-06
Payer: COMMERCIAL

## 2024-08-06 ENCOUNTER — RX ONLY (RX ONLY)
Age: 74
End: 2024-08-06

## 2024-08-06 VITALS — HEIGHT: 60 IN

## 2024-08-06 DIAGNOSIS — Z94.7: ICD-10-CM

## 2024-08-06 DIAGNOSIS — H11.32: ICD-10-CM

## 2024-08-06 PROCEDURE — 99024 POSTOP FOLLOW-UP VISIT: CPT | Performed by: OPHTHALMOLOGY

## 2024-08-06 ASSESSMENT — CONFRONTATIONAL VISUAL FIELD TEST (CVF)
OS_FINDINGS: FULL
OD_FINDINGS: FULL

## 2024-08-06 ASSESSMENT — LID EXAM ASSESSMENTS
OS_COMMENTS: INCISION INTACT
OD_COMMENTS: INCISION INTACT

## 2024-08-11 NOTE — H&P PST ADULT - CONSTITUTIONAL DETAILS
well-groomed/no distress
Bed/Stretcher in lowest position, wheels locked, appropriate side rails in place/Call bell, personal items and telephone in reach/Instruct patient to call for assistance before getting out of bed/chair/stretcher/Non-slip footwear applied when patient is off stretcher/[place] to call system/Physically safe environment - no spills, clutter or unnecessary equipment/Purposeful proactive rounding/Room/bathroom lighting operational, light cord in reach

## 2024-08-19 LAB
CULTURE RESULTS: SIGNIFICANT CHANGE UP
SPECIMEN SOURCE: SIGNIFICANT CHANGE UP

## 2024-08-19 RX ORDER — CHLORHEXIDINE GLUCONATE 40 MG/ML
1 SOLUTION TOPICAL ONCE
Refills: 0 | Status: DISCONTINUED | OUTPATIENT
Start: 2024-08-20 | End: 2024-09-03

## 2024-08-19 NOTE — H&P PST ADULT - EKG AND INTERPRETATION
PRE-OP DIAGNOSIS:  Fetal heart rate/rhythm abnormality affecting management of mother 24-Nov-2021 09:30:53  Rachel Silva   SR with septal wave infarct; RBBB

## 2024-08-19 NOTE — H&P PST ADULT - HISTORY OF PRESENT ILLNESS
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 Now with TTE with severe AI, EF 50-55%, mild MR. Now presents for MACY to further evaluate the aortic regurgitation.    Review of Systems: negative unless mentioned in HPI    Symptoms:        Angina (Class):        Ischemic Symptoms:     Heart Failure:        Systolic/Diastolic/Combined:        NYHA Class (within 2 weeks):     Assessment of LVEF (Must be within 6 months):       EF:        Assessed by:        Date:     Prior Cardiac Interventions (LHC, stents, CABG):       PCI's (Date, Stents, Vessels):        CABG (Date, Grafts):     Stress Test (Date, Findings):     Echo (Date, Findings):     Antianginal Therapies:        Beta Blockers:         Calcium Channel Blockers:        Long Acting Nitrates:        Ranexa:     Associated Risk Factors:        Fragility Score: (N/A, mild, moderate, severe)       Cerebrovascular Disease: N/A       Chronic Lung Disease: N/A       Peripheral Arterial Disease: N/A       Chronic Kidney Disease (if yes, what is GFR): N/A       Uncontrolled Diabetes (if yes, what is HgbA1C or FBS): N/A       Poorly Controlled Hypertension (if yes, what is SBP): N/A       Morbid Obesity (if yes, what is BMI): N/A       History of Recent Ventricular Arrhythmia: N/A       Inability to Ambulate Safely: N/A       Need for Therapeutic Anticoagulation: N/A       Antiplatelet or Contrast Allergy: N/A    PHYSICAL EXAM:  Constitutional: A & O x 3, NAD  HEENT:  Normal oral mucosa, PERRL, EOMI	  Cardiovascular: S1 S2, III/VI *** murmur, No JVD  Respiratory: Lungs clear to auscultation	  Gastrointestinal:  Soft, Non-tender, + BS	  Skin: No rashes or cyanosis  Neurologic: No deficit appreciated  Extremities: Normal range of motion, *** edema  Vascular: distal pulses +     Risk Stratification:  ASA:   Mallampati:   Bleeding Risk:   Creatinine:   GFR:   Pt assessed, appropriate for sedation, pt educated regarding the plan for Versed/Fentanyl as needed.    Plan/Recommendations:   -plan for ***  -preferred access: RRA ***  -patient seen and examined  -confirmed appropriate NPO duration  -ECG and Labs reviewed  -Aspirin 81mg po pre-cath ***  -NS 250mL IV bolus pre-cath ***  -procedure discussed with patient; risks and benefits explained, questions answered  -consent obtained by attending IC    Risks, benefits, and alternatives reviewed.  Risks including but not limited to MI, death, stroke, bleeding, infection, vessel injury, hematoma, renal failure, allergic reaction, urgent open heart surgery, restenosis and stent thrombosis were reviewed.  All questions answered.  Patient is agreeable to proceed. 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 Now with TTE with severe AI, EF 50-55%, mild MR. Now presents for MACY to further evaluate the aortic regurgitation.    Review of Systems: negative unless mentioned in HPI    Symptoms:        Angina (Class): N/A       Ischemic Symptoms:     Heart Failure: N/A       Systolic/Diastolic/Combined:        NYHA Class (within 2 weeks):     Assessment of LVEF (Must be within 6 months):       EF: 50-55%       Assessed by: TTE       Date:     Prior Cardiac Interventions (LHC, stents, CABG):       PCI's (Date, Stents, Vessels):  AWMI s/p primary PCI with ADRIAN to LAD in 2010 (Promus 3.5 x 23 mm and 3.5 x 8 mm),       CABG (Date, Grafts):     Stress Test (Date, Findings): N/A    Echo (Date, Findings):   7/19/24 TTE CONCLUSIONS:  1. Left ventricular systolic function is normal with an ejection fraction of 49 % by Vera's method of disks with an ejection fraction visually estimated at 50 to 55 %.  2. Basal anteroseptal segment, apical septal segment, apical inferior segment, and apex are abnormal.  3. Normal left ventricular diastolic function.  4. Normal right ventricular cavity size and normal right ventricular systolic function.  5. Mild mitral regurgitation.  6. Trileaflet aortic valve with normal systolic excursion.  7. Severe aortic regurgitation, pressure half time appears to underestimate the severity of AI and would recommend MACY for further evaluation.  8. Compared to the transthoracic echocardiogram performed on 6/30/2022, aortic regurgitation appears severe now, perviously mild to moderate.    Antianginal Therapies:        Beta Blockers:  Toprol XL 50mg PO QD       Calcium Channel Blockers: Norvasc 5mg PO QD       Long Acting Nitrates:        Ranexa:     Associated Risk Factors:        Fragility Score: (N/A, mild, moderate, severe)       Cerebrovascular Disease: N/A       Chronic Lung Disease: N/A       Peripheral Arterial Disease: N/A       Chronic Kidney Disease (if yes, what is GFR): N/A       Uncontrolled Diabetes (if yes, what is HgbA1C or FBS): N/A       Poorly Controlled Hypertension (if yes, what is SBP): N/A       Morbid Obesity (if yes, what is BMI): N/A       History of Recent Ventricular Arrhythmia: N/A       Inability to Ambulate Safely: N/A       Need for Therapeutic Anticoagulation: N/A       Antiplatelet or Contrast Allergy: N/A    PHYSICAL EXAM:  Constitutional: A & O x 3, NAD  HEENT:  Normal oral mucosa, PERRL, EOMI	  Cardiovascular: S1 S2, III/VI *** murmur, No JVD  Respiratory: Lungs clear to auscultation	  Gastrointestinal:  Soft, Non-tender, + BS	  Skin: No rashes or cyanosis  Neurologic: No deficit appreciated  Extremities: Normal range of motion, *** edema  Vascular: distal pulses +     Risk Stratification:  ASA: 3  Mallampati:   Bleeding Risk:   Creatinine:   GFR:   Pt assessed, appropriate for sedation, pt educated regarding the plan for Versed/Fentanyl as needed.    Plan/Recommendations:   -plan for [facility]  -preferred access: RRA vs. RFA  -patient seen and examined  -confirmed appropriate NPO duration  -ECG and Labs reviewed  -Aspirin 81mg po pre-cath ***  -NS 250mL IV bolus pre-cath ***  -procedure discussed with patient; risks and benefits explained, questions answered  -consent obtained by attending IC    Risks, benefits, and alternatives reviewed.  Risks including but not limited to MI, death, stroke, bleeding, infection, vessel injury, hematoma, renal failure, allergic reaction, urgent open heart surgery, restenosis and stent thrombosis were reviewed.  All questions answered.  Patient is agreeable to proceed. 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 Chadwick from EP - prior EPS with Dr. Redman in 2015; prior ILR implanted 2015 and removed 2017); recent live left corneal implant (7/2024); fractured sternum 2017 with follow up cardiac catheterization on 7/1/2022 revealing minor luminal irregularities only, with patent LAD stent. Now with TTE with severe AI, EF 50-55%, mild MR. He reports occasional stabbing chest pains that last few seconds. He also endorses a new recent cough with no production and complaints of frequent palpitations mostly after exertion and lightheadedness with the palpitations. Now presents for MACY to further evaluate the aortic regurgitation.    Review of Systems: negative unless mentioned in HPI    Symptoms:        Angina (Class): N/A       Ischemic Symptoms:     Heart Failure: N/A       Systolic/Diastolic/Combined: n/a       NYHA Class (within 2 weeks): n/a    Assessment of LVEF (Must be within 6 months):       EF: 50-55%       Assessed by: TTE       Date: 7/2024    Prior Cardiac Interventions (LHC, stents, CABG):       PCI's (Date, Stents, Vessels):  AWMI s/p primary PCI with ADRIAN to LAD in 2010 (Promus 3.5 x 23 mm and 3.5 x 8 mm),       CABG (Date, Grafts):     Stress Test (Date, Findings): N/A    Echo (Date, Findings):   7/19/24 TTE CONCLUSIONS:  1. Left ventricular systolic function is normal with an ejection fraction of 49 % by Vera's method of disks with an ejection fraction visually estimated at 50 to 55 %.  2. Basal anteroseptal segment, apical septal segment, apical inferior segment, and apex are abnormal.  3. Normal left ventricular diastolic function.  4. Normal right ventricular cavity size and normal right ventricular systolic function.  5. Mild mitral regurgitation.  6. Trileaflet aortic valve with normal systolic excursion.  7. Severe aortic regurgitation, pressure half time appears to underestimate the severity of AI and would recommend MACY for further evaluation.  8. Compared to the transthoracic echocardiogram performed on 6/30/2022, aortic regurgitation appears severe now, perviously mild to moderate.    Antianginal Therapies:        Beta Blockers:  Toprol XL 50mg PO QD       Calcium Channel Blockers: Norvasc 5mg PO QD       Long Acting Nitrates: n/a       Ranexa: n/a    Associated Risk Factors:        Fragility Score: (N/A, mild, moderate, severe) mild       Cerebrovascular Disease: N/A       Chronic Lung Disease: N/A       Peripheral Arterial Disease: N/A       Chronic Kidney Disease (if yes, what is GFR): N/A       Uncontrolled Diabetes (if yes, what is HgbA1C or FBS): N/A       Poorly Controlled Hypertension (if yes, what is SBP): N/A       Morbid Obesity (if yes, what is BMI): N/A       History of Recent Ventricular Arrhythmia: N/A       Inability to Ambulate Safely: N/A       Need for Therapeutic Anticoagulation: N/A       Antiplatelet or Contrast Allergy: N/A

## 2024-08-19 NOTE — H&P PST ADULT - ASSESSMENT
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 Chadwick from EP - prior EPS with Dr. Redman in 2015; prior ILR implanted 2015 and removed 2017); recent live left corneal implant (7/2024); fractured sternum 2017 with follow up cardiac catheterization on 7/1/2022 revealing minor luminal irregularities only, with patent LAD stent. Now with TTE with severe AI, EF 50-55%, mild MR. He reports occasional stabbing chest pains that last few seconds. He also endorses a new recent cough with no production and complaints of frequent palpitations mostly after exertion and lightheadedness with the palpitations. Now presents for MACY to further evaluate the aortic regurgitation.    RISK ASSESSMENTS:  ASA: PER ANESTHESIA  MALLAMPTI: PER ANESTHESIA    PLAN:  -Procedure and risks discussed with patient  -EKG and labs reviewed  -NPO Status confirmed with patient  -Consent obtained by attending cardiologist and anesthesiologist

## 2024-08-19 NOTE — H&P PST ADULT - HEMATOLOGY/LYMPHATICS
No care due was identified.  [facility] Embedded Care Due Messages. Reference number: [id number].   5/22/2023 5:52:57 AM CDT  
Refill Decision Note   Jasson Pineda  is requesting a refill authorization.  Brief Assessment and Rationale for Refill:  Approve     Medication Therapy Plan:       Medication Reconciliation Completed: No   Comments:     No Care Gaps recommended.     Note composed:9:47 AM 05/22/2023          
negative

## 2024-08-19 NOTE — H&P PST ADULT - CARDIOVASCULAR COMMENTS
reports intermittent stabbing chest pains; new nonproductive cough; and reports exertional palpitations associated with dizziness

## 2024-08-19 NOTE — ASU PREOP CHECKLIST - HEIGHT IN FEET
Pt contacted clinic stating that she has bleeding when she wipes.  Bleeding goes from pink to bright red.  She does not see blood in toilet only when she wipes.  Pt states some times it is heavier then goes back to light bleeding.  She is also with c/o stabbing/burning pain in vagina.  Pt denies intercourse.  She is added to Dr Bridges schedule at 2:20pm today.  Precautions reviewed.  Understanding voiced.  
5

## 2024-08-19 NOTE — H&P PST ADULT - NSICDXPASTMEDICALHX_GEN_ALL_CORE_FT
PAST MEDICAL HISTORY:  BPH (benign prostatic hyperplasia)     Coronary artery disease involving native coronary artery of native heart with unstable angina pector     COVID-19 vaccine series completed Moderna x 2    H/O Parkinson's disease     [place] (hard of hearing) Understands lip reading and loud, low volume speech. Does not know sign language.    Hypertension     MI (myocardial infarction) may 2010 stents x 2    Mixed hyperlipidemia     NSVT (nonsustained ventricular tachycardia)     Stage 3 chronic kidney disease     TIA (transient ischemic attack)

## 2024-08-20 ENCOUNTER — TRANSCRIPTION ENCOUNTER (OUTPATIENT)
Age: 74
End: 2024-08-20

## 2024-08-20 ENCOUNTER — OUTPATIENT (OUTPATIENT)
Dept: OUTPATIENT SERVICES | Facility: HOSPITAL | Age: 74
LOS: 1 days | End: 2024-08-20
Payer: COMMERCIAL

## 2024-08-20 ENCOUNTER — RESULT REVIEW (OUTPATIENT)
Age: 74
End: 2024-08-20

## 2024-08-20 VITALS
RESPIRATION RATE: 16 BRPM | TEMPERATURE: 98 F | DIASTOLIC BLOOD PRESSURE: 70 MMHG | OXYGEN SATURATION: 98 % | HEART RATE: 67 BPM | SYSTOLIC BLOOD PRESSURE: 160 MMHG

## 2024-08-20 VITALS
DIASTOLIC BLOOD PRESSURE: 60 MMHG | OXYGEN SATURATION: 98 % | HEART RATE: 65 BPM | RESPIRATION RATE: 16 BRPM | SYSTOLIC BLOOD PRESSURE: 120 MMHG

## 2024-08-20 DIAGNOSIS — Z98.89 OTHER SPECIFIED POSTPROCEDURAL STATES: Chronic | ICD-10-CM

## 2024-08-20 DIAGNOSIS — Z09 ENCOUNTER FOR FOLLOW-UP EXAMINATION AFTER COMPLETED TREATMENT FOR CONDITIONS OTHER THAN MALIGNANT NEOPLASM: Chronic | ICD-10-CM

## 2024-08-20 DIAGNOSIS — Z90.89 ACQUIRED ABSENCE OF OTHER ORGANS: Chronic | ICD-10-CM

## 2024-08-20 DIAGNOSIS — Z98.890 OTHER SPECIFIED POSTPROCEDURAL STATES: Chronic | ICD-10-CM

## 2024-08-20 DIAGNOSIS — Z95.5 PRESENCE OF CORONARY ANGIOPLASTY IMPLANT AND GRAFT: Chronic | ICD-10-CM

## 2024-08-20 DIAGNOSIS — I35.1 NONRHEUMATIC AORTIC (VALVE) INSUFFICIENCY: ICD-10-CM

## 2024-08-20 DIAGNOSIS — Z87.19 PERSONAL HISTORY OF OTHER DISEASES OF THE DIGESTIVE SYSTEM: Chronic | ICD-10-CM

## 2024-08-20 LAB
ANION GAP SERPL CALC-SCNC: 13 MMOL/L — SIGNIFICANT CHANGE UP (ref 5–17)
BASOPHILS # BLD AUTO: 0.12 K/UL — SIGNIFICANT CHANGE UP (ref 0–0.2)
BASOPHILS NFR BLD AUTO: 1.3 % — SIGNIFICANT CHANGE UP (ref 0–2)
BUN SERPL-MCNC: 24.9 MG/DL — HIGH (ref 8–20)
CALCIUM SERPL-MCNC: 9.3 MG/DL — SIGNIFICANT CHANGE UP (ref 8.4–10.5)
CHLORIDE SERPL-SCNC: 103 MMOL/L — SIGNIFICANT CHANGE UP (ref 96–108)
CO2 SERPL-SCNC: 23 MMOL/L — SIGNIFICANT CHANGE UP (ref 22–29)
CREAT SERPL-MCNC: 1.89 MG/DL — HIGH (ref 0.5–1.3)
EGFR: 37 ML/MIN/1.73M2 — LOW
EOSINOPHIL # BLD AUTO: 0.5 K/UL — SIGNIFICANT CHANGE UP (ref 0–0.5)
EOSINOPHIL NFR BLD AUTO: 5.3 % — SIGNIFICANT CHANGE UP (ref 0–6)
GLUCOSE SERPL-MCNC: 100 MG/DL — HIGH (ref 70–99)
HCT VFR BLD CALC: 44.2 % — SIGNIFICANT CHANGE UP (ref 39–50)
HGB BLD-MCNC: 14.8 G/DL — SIGNIFICANT CHANGE UP (ref 13–17)
IMM GRANULOCYTES NFR BLD AUTO: 1.2 % — HIGH (ref 0–0.9)
LYMPHOCYTES # BLD AUTO: 1.67 K/UL — SIGNIFICANT CHANGE UP (ref 1–3.3)
LYMPHOCYTES # BLD AUTO: 17.7 % — SIGNIFICANT CHANGE UP (ref 13–44)
MAGNESIUM SERPL-MCNC: 2.1 MG/DL — SIGNIFICANT CHANGE UP (ref 1.6–2.6)
MCHC RBC-ENTMCNC: 28.7 PG — SIGNIFICANT CHANGE UP (ref 27–34)
MCHC RBC-ENTMCNC: 33.5 GM/DL — SIGNIFICANT CHANGE UP (ref 32–36)
MCV RBC AUTO: 85.8 FL — SIGNIFICANT CHANGE UP (ref 80–100)
MONOCYTES # BLD AUTO: 1.11 K/UL — HIGH (ref 0–0.9)
MONOCYTES NFR BLD AUTO: 11.8 % — SIGNIFICANT CHANGE UP (ref 2–14)
NEUTROPHILS # BLD AUTO: 5.93 K/UL — SIGNIFICANT CHANGE UP (ref 1.8–7.4)
NEUTROPHILS NFR BLD AUTO: 62.7 % — SIGNIFICANT CHANGE UP (ref 43–77)
PLATELET # BLD AUTO: 199 K/UL — SIGNIFICANT CHANGE UP (ref 150–400)
POTASSIUM SERPL-MCNC: 4.7 MMOL/L — SIGNIFICANT CHANGE UP (ref 3.5–5.3)
POTASSIUM SERPL-SCNC: 4.7 MMOL/L — SIGNIFICANT CHANGE UP (ref 3.5–5.3)
RBC # BLD: 5.15 M/UL — SIGNIFICANT CHANGE UP (ref 4.2–5.8)
RBC # FLD: 13.2 % — SIGNIFICANT CHANGE UP (ref 10.3–14.5)
SODIUM SERPL-SCNC: 139 MMOL/L — SIGNIFICANT CHANGE UP (ref 135–145)
WBC # BLD: 9.44 K/UL — SIGNIFICANT CHANGE UP (ref 3.8–10.5)
WBC # FLD AUTO: 9.44 K/UL — SIGNIFICANT CHANGE UP (ref 3.8–10.5)

## 2024-08-20 PROCEDURE — 93325 DOPPLER ECHO COLOR FLOW MAPG: CPT

## 2024-08-20 PROCEDURE — 93312 ECHO TRANSESOPHAGEAL: CPT

## 2024-08-20 PROCEDURE — 93320 DOPPLER ECHO COMPLETE: CPT | Mod: 26

## 2024-08-20 PROCEDURE — 93325 DOPPLER ECHO COLOR FLOW MAPG: CPT | Mod: 26

## 2024-08-20 PROCEDURE — 36415 COLL VENOUS BLD VENIPUNCTURE: CPT

## 2024-08-20 PROCEDURE — 83735 ASSAY OF MAGNESIUM: CPT

## 2024-08-20 PROCEDURE — 80048 BASIC METABOLIC PNL TOTAL CA: CPT

## 2024-08-20 PROCEDURE — 93312 ECHO TRANSESOPHAGEAL: CPT | Mod: 26

## 2024-08-20 PROCEDURE — 85025 COMPLETE CBC W/AUTO DIFF WBC: CPT

## 2024-08-20 PROCEDURE — 93005 ELECTROCARDIOGRAM TRACING: CPT

## 2024-08-20 PROCEDURE — 93320 DOPPLER ECHO COMPLETE: CPT

## 2024-08-20 PROCEDURE — 93010 ELECTROCARDIOGRAM REPORT: CPT

## 2024-08-20 RX ORDER — ASPIRIN 81 MG
1 TABLET, DELAYED RELEASE (ENTERIC COATED) ORAL
Refills: 0 | DISCHARGE

## 2024-08-20 RX ORDER — CARBIDOPA AND LEVODOPA 25; 100 MG/1; MG/1
1 TABLET ORAL
Refills: 0 | DISCHARGE

## 2024-08-20 NOTE — DISCHARGE NOTE PROVIDER - HOSPITAL COURSE
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 Chadwick from [place] - prior EPS with Dr. Redman in 2015; prior ILR implanted 2015 and removed 2017); recent live left corneal implant (7/2024); fractured sternum 2017 with follow up cardiac catheterization on 7/1/2022 revealing minor luminal irregularities only, with patent LAD stent. Now with TTE with severe AI, EF 50-55%, mild MR. He reports occasional stabbing chest pains that last few seconds. He also endorses a new recent cough with no production and complaints of frequent palpitations mostly after exertion and lightheadedness with the palpitations. Now presents for MACY to further evaluate the aortic regurgitation.    Now s/p MACY revealing Normal EF; no LEON clot; no PFO; moderate AI (PRELIMINARY VERBAL REPORT FROM DR BETHEA; PENDING OFFICIAL REPORT)    PLAN:  -Vital signs:        Every 10 minutes times 3 sets,       then every 30 minutes until Xiomy score returns to baseline,        then as per unit routine.  Bedrest for 1 hour.  NPO for 2 hours post procedure.  Assess swallow prior to PO intake  -discharge home today

## 2024-08-20 NOTE — DISCHARGE NOTE NURSING/CASE MANAGEMENT/SOCIAL WORK - PATIENT PORTAL LINK FT
You can access the FollowMyHealth Patient Portal offered by [facility] by registering at the following website: [email or web address]. By joining [employer]’s FollowMyHealth portal, you will also be able to view your health information using other applications (apps) compatible with our system.

## 2024-08-20 NOTE — DISCHARGE NOTE PROVIDER - NSDCMRMEDTOKEN_GEN_ALL_CORE_FT
amLODIPine 5 mg oral tablet: 1 tab(s) orally once a day  aspirin 81 mg oral tablet, chewable: 1 tab(s) chewed once a day  carbidopa-levodopa 25 mg-100 mg oral tablet: 1 tab(s) orally 3 times a day  losartan 100 mg oral tablet: 1 tab(s) orally once a day in the morning  Metoprolol Succinate ER 50 mg oral tablet, extended release: 1 tab(s) orally once a day in evening

## 2024-08-20 NOTE — DISCHARGE NOTE PROVIDER - NSDCCPTREATMENT_GEN_ALL_CORE_FT
PRINCIPAL PROCEDURE  Procedure: Transesophageal echocardiogram (MACY)  Findings and Treatment: -You had a MACY today with Dr. West today on 8/20/24.   -You may have a little trouble swallowing right after the test, but this will go away within a few hours.  -It is common to have a sore throat for a day or two after the test. You may use throat lozengers to alleviate these symptoms.  -Because of the sedative you received during the procedure, please don't drive x 24 hours or drink alcohol for 24 hours.  -If your sore throat persists for more than 48 hours, please contact your doctor.  -Please have softer foods for the next 24 hours. Please avoid chewy foods or crunchy foods that may irritate or get stuck in your throat. Please avoid spicy foods for 24 hours.   -Please return to nearest ED immediately if you have any chest pain, chest pressure, palpitations, shortness of breath, excessive sleepiness or increase in lethargy, difficulty arousing, or change in neurologic status or productive cough with blood tinged sputum.  -Please follow with your cardiologist in 1-2 weeks after the procedure.

## 2024-08-20 NOTE — DISCHARGE NOTE PROVIDER - CARE PROVIDER_API CALL
Anne West  Cardiology  [address] [place] [id number]  Phone: [phone]  Fax: [phone]  Established Patient  Follow Up Time: 1 week

## 2024-08-20 NOTE — DISCHARGE NOTE PROVIDER - NSDCCPCAREPLAN_GEN_ALL_CORE_FT
PRINCIPAL DISCHARGE DIAGNOSIS  Diagnosis: Aortic regurgitation  Assessment and Plan of Treatment: -Aortic regurgitation is a heart valve condition in which the aortic valve doesn't properly close. Blood flows backwards into the heart instead of pumping out. This requires the heart to pump harder.  -Please take all of your medications as previously prescribed  -Please follow up with your cardiologist Dr. Anne West. Her office will be contacting you when your external heart monitor is ready to be picked up. If you do not hear from them by next week please call them  -Please return to nearest ER if you develop any chest pain, chest pressure, shortness of breath, palpitations that are persistent or passing out.

## 2024-08-22 DIAGNOSIS — R42 DIZZINESS AND GIDDINESS: ICD-10-CM

## 2024-09-04 ENCOUNTER — OFFICE (OUTPATIENT)
Dept: URBAN - METROPOLITAN AREA CLINIC 6 | Facility: CLINIC | Age: 74
Setting detail: OPHTHALMOLOGY
End: 2024-09-04
Payer: COMMERCIAL

## 2024-09-04 DIAGNOSIS — Z94.7: ICD-10-CM

## 2024-09-04 PROCEDURE — 99024 POSTOP FOLLOW-UP VISIT: CPT | Performed by: OPHTHALMOLOGY

## 2024-09-04 ASSESSMENT — LID EXAM ASSESSMENTS
OS_COMMENTS: INCISION INTACT
OD_COMMENTS: INCISION INTACT

## 2024-09-04 ASSESSMENT — CONFRONTATIONAL VISUAL FIELD TEST (CVF)
OD_FINDINGS: FULL
OS_FINDINGS: FULL

## 2024-10-04 ENCOUNTER — OFFICE (OUTPATIENT)
Dept: URBAN - METROPOLITAN AREA CLINIC 6 | Facility: CLINIC | Age: 74
Setting detail: OPHTHALMOLOGY
End: 2024-10-04
Payer: COMMERCIAL

## 2024-10-04 DIAGNOSIS — Z94.7: ICD-10-CM

## 2024-10-04 PROCEDURE — 99024 POSTOP FOLLOW-UP VISIT: CPT | Performed by: OPHTHALMOLOGY

## 2024-10-04 ASSESSMENT — REFRACTION_CURRENTRX
OD_SPHERE: +1.50
OS_ADD: +3.00
OS_SPHERE: +1.25
OD_OVR_VA: 20/
OD_ADD: +2.25
OS_VPRISM_DIRECTION: PROGS
OD_ADD: +3.00
OD_SPHERE: +1.25
OD_VPRISM_DIRECTION: PROGS
OD_CYLINDER: -0.75
OS_OVR_VA: 20/
OD_VPRISM_DIRECTION: PROGS
OS_SPHERE: +1.25
OS_CYLINDER: -1.00
OD_CYLINDER: -1.00
OS_AXIS: 175
OS_AXIS: 178
OS_OVR_VA: 20/
OD_AXIS: 170
OS_ADD: +2.25
OD_OVR_VA: 20/
OD_AXIS: 146
OS_CYLINDER: -1.25
OS_VPRISM_DIRECTION: PROGS

## 2024-10-04 ASSESSMENT — KERATOMETRY
OD_K2POWER_DIOPTERS: 42.25
OS_K1POWER_DIOPTERS: 41.75
OD_AXISANGLE_DEGREES: 059
OD_K1POWER_DIOPTERS: 41.75
OS_K2POWER_DIOPTERS: 42.50
OS_AXISANGLE_DEGREES: 044
METHOD_AUTO_MANUAL: AUTO

## 2024-10-04 ASSESSMENT — REFRACTION_MANIFEST
OS_VA2: 20/20
OD_VA2: 20/20
OD_SPHERE: +0.25
OD_ADD: +2.50
OD_VA1: 20/20
OD_CYLINDER: SPHERE
OS_VA1: 20/20-
OS_SPHERE: +0.50
OU_VA: 20/20
OS_CYLINDER: SPHERE
OS_ADD: +2.50

## 2024-10-04 ASSESSMENT — REFRACTION_AUTOREFRACTION
OD_AXIS: 130
OS_SPHERE: +0.75
OS_CYLINDER: -0.25
OD_CYLINDER: -0.25
OS_AXIS: 126
OD_SPHERE: +0.50

## 2024-10-04 ASSESSMENT — PACHYMETRY
OS_CT_CORRECTION: -1
OS_CT_UM: 554

## 2024-10-04 ASSESSMENT — LID EXAM ASSESSMENTS
OD_COMMENTS: INCISION INTACT
OS_COMMENTS: INCISION INTACT

## 2024-10-04 ASSESSMENT — VISUAL ACUITY
OD_BCVA: 20/30+2
OS_BCVA: 20/20-1

## 2024-10-04 ASSESSMENT — TONOMETRY
OD_IOP_MMHG: 12
OS_IOP_MMHG: 11

## 2024-10-04 ASSESSMENT — CONFRONTATIONAL VISUAL FIELD TEST (CVF)
OS_FINDINGS: FULL
OD_FINDINGS: FULL

## 2024-10-04 ASSESSMENT — SUPERFICIAL PUNCTATE KERATITIS (SPK)
OD_SPK: T
OS_SPK: T

## 2024-10-04 ASSESSMENT — CORNEAL DYSTROPHY - POSTERIOR: OD_POSTERIORDYSTROPHY: 2+ GUTTATA

## 2024-11-05 ENCOUNTER — OFFICE (OUTPATIENT)
Dept: URBAN - METROPOLITAN AREA CLINIC 6 | Facility: CLINIC | Age: 74
Setting detail: OPHTHALMOLOGY
End: 2024-11-05
Payer: COMMERCIAL

## 2024-11-05 DIAGNOSIS — Z94.7: ICD-10-CM

## 2024-11-05 PROCEDURE — 99024 POSTOP FOLLOW-UP VISIT: CPT | Performed by: OPHTHALMOLOGY

## 2024-11-05 ASSESSMENT — REFRACTION_AUTOREFRACTION
OD_CYLINDER: -0.25
OD_AXIS: 113
OS_SPHERE: +0.50
OS_CYLINDER: -0.50
OS_AXIS: 134
OD_SPHERE: +0.25

## 2024-11-05 ASSESSMENT — REFRACTION_MANIFEST
OS_VA2: 20/20
OS_ADD: +2.50
OU_VA: 20/20
OD_ADD: +2.50
OS_CYLINDER: SPHERE
OD_SPHERE: +0.25
OD_VA1: 20/20
OS_SPHERE: +0.50
OS_VA1: 20/20-
OD_VA2: 20/20
OD_CYLINDER: SPHERE

## 2024-11-05 ASSESSMENT — CORNEAL DYSTROPHY - POSTERIOR: OD_POSTERIORDYSTROPHY: 2+ GUTTATA

## 2024-11-05 ASSESSMENT — REFRACTION_CURRENTRX
OD_ADD: +2.25
OD_ADD: +3.00
OS_AXIS: 175
OS_VPRISM_DIRECTION: PROGS
OD_CYLINDER: -0.75
OD_SPHERE: +1.25
OS_VPRISM_DIRECTION: PROGS
OS_ADD: +3.00
OS_OVR_VA: 20/
OD_OVR_VA: 20/
OD_SPHERE: +1.50
OS_OVR_VA: 20/
OS_CYLINDER: -1.25
OD_CYLINDER: -1.00
OS_CYLINDER: -1.00
OS_SPHERE: +1.25
OD_VPRISM_DIRECTION: PROGS
OD_AXIS: 146
OD_VPRISM_DIRECTION: PROGS
OD_OVR_VA: 20/
OS_ADD: +2.25
OS_AXIS: 178
OS_SPHERE: +1.25
OD_AXIS: 170

## 2024-11-05 ASSESSMENT — LID EXAM ASSESSMENTS
OD_COMMENTS: INCISION INTACT
OS_COMMENTS: INCISION INTACT

## 2024-11-05 ASSESSMENT — KERATOMETRY
OS_AXISANGLE_DEGREES: 076
OS_K1POWER_DIOPTERS: 42.00
OS_K2POWER_DIOPTERS: 42.75
METHOD_AUTO_MANUAL: AUTO
OD_AXISANGLE_DEGREES: 079
OD_K1POWER_DIOPTERS: 41.75
OD_K2POWER_DIOPTERS: 42.25

## 2024-11-05 ASSESSMENT — TONOMETRY
OD_IOP_MMHG: 14
OS_IOP_MMHG: 15

## 2024-11-05 ASSESSMENT — SUPERFICIAL PUNCTATE KERATITIS (SPK)
OD_SPK: T
OS_SPK: T

## 2024-11-05 ASSESSMENT — PACHYMETRY
OS_CT_CORRECTION: -1
OS_CT_UM: 554

## 2024-11-05 ASSESSMENT — VISUAL ACUITY
OD_BCVA: 20/20-2
OS_BCVA: 20/20-

## 2024-11-05 ASSESSMENT — CONFRONTATIONAL VISUAL FIELD TEST (CVF)
OD_FINDINGS: FULL
OS_FINDINGS: FULL

## 2024-12-10 ENCOUNTER — OFFICE (OUTPATIENT)
Dept: URBAN - METROPOLITAN AREA CLINIC 6 | Facility: CLINIC | Age: 74
Setting detail: OPHTHALMOLOGY
End: 2024-12-10
Payer: COMMERCIAL

## 2024-12-10 VITALS — HEIGHT: 60 IN

## 2024-12-10 DIAGNOSIS — Z94.7: ICD-10-CM

## 2024-12-10 PROCEDURE — 99212 OFFICE O/P EST SF 10 MIN: CPT | Performed by: OPHTHALMOLOGY

## 2024-12-10 ASSESSMENT — REFRACTION_CURRENTRX
OD_OVR_VA: 20/
OD_VPRISM_DIRECTION: PROGS
OD_SPHERE: +1.50
OS_SPHERE: +1.25
OD_ADD: +2.25
OS_AXIS: 178
OD_SPHERE: +1.25
OD_OVR_VA: 20/
OS_VPRISM_DIRECTION: PROGS
OS_ADD: +2.25
OS_ADD: +3.00
OS_AXIS: 175
OS_VPRISM_DIRECTION: PROGS
OD_VPRISM_DIRECTION: PROGS
OS_CYLINDER: -1.25
OD_CYLINDER: -1.00
OD_CYLINDER: -0.75
OS_CYLINDER: -1.00
OS_OVR_VA: 20/
OD_ADD: +3.00
OS_SPHERE: +1.25
OD_AXIS: 146
OS_OVR_VA: 20/
OD_AXIS: 170

## 2024-12-10 ASSESSMENT — REFRACTION_AUTOREFRACTION
OD_CYLINDER: -0.25
OD_SPHERE: PLANO
OS_AXIS: 134
OS_CYLINDER: -0.75
OD_AXIS: 121
OS_SPHERE: +0.75

## 2024-12-10 ASSESSMENT — KERATOMETRY
OD_K1POWER_DIOPTERS: 42.00
OS_K1POWER_DIOPTERS: 42.00
OD_K2POWER_DIOPTERS: 42.25
METHOD_AUTO_MANUAL: AUTO
OS_AXISANGLE_DEGREES: 070
OD_AXISANGLE_DEGREES: 057
OS_K2POWER_DIOPTERS: 42.75

## 2024-12-10 ASSESSMENT — REFRACTION_MANIFEST
OS_VA1: 20/20-
OS_ADD: +2.50
OU_VA: 20/20
OD_ADD: +2.50
OS_CYLINDER: SPHERE
OS_VA2: 20/20
OD_VA1: 20/20
OD_VA2: 20/20
OD_SPHERE: +0.25
OS_SPHERE: +0.50
OD_CYLINDER: SPHERE

## 2024-12-10 ASSESSMENT — CONFRONTATIONAL VISUAL FIELD TEST (CVF)
OD_FINDINGS: FULL
OS_FINDINGS: FULL

## 2024-12-10 ASSESSMENT — TONOMETRY
OD_IOP_MMHG: 12
OS_IOP_MMHG: 13

## 2024-12-10 ASSESSMENT — VISUAL ACUITY
OS_BCVA: 20/20
OD_BCVA: 20/20-1

## 2024-12-10 ASSESSMENT — LID EXAM ASSESSMENTS
OS_COMMENTS: INCISION INTACT
OD_COMMENTS: INCISION INTACT

## 2024-12-10 ASSESSMENT — CORNEAL DYSTROPHY - POSTERIOR: OD_POSTERIORDYSTROPHY: 2+ GUTTATA

## 2024-12-10 ASSESSMENT — SUPERFICIAL PUNCTATE KERATITIS (SPK)
OS_SPK: T
OD_SPK: T

## 2024-12-10 ASSESSMENT — PACHYMETRY
OS_CT_UM: 554
OS_CT_CORRECTION: -1

## 2025-01-09 ENCOUNTER — APPOINTMENT (OUTPATIENT)
Dept: CARDIOLOGY | Facility: CLINIC | Age: 75
End: 2025-01-09

## 2025-01-21 ENCOUNTER — OFFICE (OUTPATIENT)
Dept: URBAN - METROPOLITAN AREA CLINIC 6 | Facility: CLINIC | Age: 75
Setting detail: OPHTHALMOLOGY
End: 2025-01-21
Payer: COMMERCIAL

## 2025-01-21 DIAGNOSIS — H16.223: ICD-10-CM

## 2025-01-21 DIAGNOSIS — G20.C: ICD-10-CM

## 2025-01-21 DIAGNOSIS — H40.013: ICD-10-CM

## 2025-01-21 DIAGNOSIS — H18.511: ICD-10-CM

## 2025-01-21 DIAGNOSIS — Z94.7: ICD-10-CM

## 2025-01-21 DIAGNOSIS — Z96.1: ICD-10-CM

## 2025-01-21 PROCEDURE — 92286 ANT SGM IMG I&R SPECLR MIC: CPT | Performed by: OPHTHALMOLOGY

## 2025-01-21 PROCEDURE — 99213 OFFICE O/P EST LOW 20 MIN: CPT | Performed by: OPHTHALMOLOGY

## 2025-01-21 PROCEDURE — 92083 EXTENDED VISUAL FIELD XM: CPT | Performed by: OPHTHALMOLOGY

## 2025-01-21 ASSESSMENT — SUPERFICIAL PUNCTATE KERATITIS (SPK)
OD_SPK: T
OS_SPK: T

## 2025-01-21 ASSESSMENT — REFRACTION_CURRENTRX
OD_ADD: +2.25
OS_SPHERE: +1.25
OS_AXIS: 178
OD_ADD: +3.00
OS_CYLINDER: -1.25
OD_SPHERE: +1.25
OS_SPHERE: +1.25
OS_ADD: +2.25
OD_CYLINDER: -0.75
OD_OVR_VA: 20/
OD_SPHERE: +1.50
OS_VPRISM_DIRECTION: PROGS
OD_AXIS: 146
OS_VPRISM_DIRECTION: PROGS
OD_VPRISM_DIRECTION: PROGS
OD_VPRISM_DIRECTION: PROGS
OS_ADD: +3.00
OS_OVR_VA: 20/
OD_OVR_VA: 20/
OS_OVR_VA: 20/
OS_AXIS: 175
OD_CYLINDER: -1.00
OS_CYLINDER: -1.00
OD_AXIS: 170

## 2025-01-21 ASSESSMENT — REFRACTION_AUTOREFRACTION
OD_CYLINDER: -0.25
OD_SPHERE: +0.50
OS_CYLINDER: -0.50
OD_AXIS: 080
OS_AXIS: 144
OS_SPHERE: +0.50

## 2025-01-21 ASSESSMENT — KERATOMETRY
OS_K1POWER_DIOPTERS: 42.00
OD_AXISANGLE_DEGREES: 069
OS_K2POWER_DIOPTERS: 42.75
OS_AXISANGLE_DEGREES: 063
OD_K1POWER_DIOPTERS: 41.75
OD_K2POWER_DIOPTERS: 42.25
METHOD_AUTO_MANUAL: AUTO

## 2025-01-21 ASSESSMENT — REFRACTION_MANIFEST
OS_VA2: 20/20
OD_ADD: +2.50
OS_ADD: +2.50
OD_VA2: 20/20
OS_VA1: 20/20-
OD_VA1: 20/20
OS_SPHERE: +0.50
OD_SPHERE: +0.25
OS_CYLINDER: SPHERE
OU_VA: 20/20
OD_CYLINDER: SPHERE

## 2025-01-21 ASSESSMENT — TONOMETRY
OD_IOP_MMHG: 13
OS_IOP_MMHG: 13

## 2025-01-21 ASSESSMENT — PACHYMETRY
OS_CT_UM: 554
OS_CT_CORRECTION: -1

## 2025-01-21 ASSESSMENT — LID EXAM ASSESSMENTS
OS_COMMENTS: INCISION INTACT
OD_COMMENTS: INCISION INTACT

## 2025-01-21 ASSESSMENT — CONFRONTATIONAL VISUAL FIELD TEST (CVF)
OS_FINDINGS: FULL
OD_FINDINGS: FULL

## 2025-01-21 ASSESSMENT — CORNEAL DYSTROPHY - POSTERIOR: OD_POSTERIORDYSTROPHY: 2+ GUTTATA

## 2025-01-21 ASSESSMENT — VISUAL ACUITY
OS_BCVA: 20/20
OD_BCVA: 20/20-

## 2025-02-26 PROBLEM — H40.013 GLAUCOMA SUSPECT, LOW RISK 1-2 FACTORS; BOTH EYES: Status: ACTIVE | Noted: 2025-02-26

## 2025-03-06 ENCOUNTER — APPOINTMENT (OUTPATIENT)
Dept: CARDIOLOGY | Facility: CLINIC | Age: 75
End: 2025-03-06

## 2025-03-06 VITALS
OXYGEN SATURATION: 97 % | DIASTOLIC BLOOD PRESSURE: 60 MMHG | SYSTOLIC BLOOD PRESSURE: 138 MMHG | BODY MASS INDEX: 30.81 KG/M2 | HEART RATE: 75 BPM | HEIGHT: 69 IN | WEIGHT: 208 LBS

## 2025-03-06 DIAGNOSIS — I87.2 VENOUS INSUFFICIENCY (CHRONIC) (PERIPHERAL): ICD-10-CM

## 2025-03-06 PROCEDURE — 93000 ELECTROCARDIOGRAM COMPLETE: CPT | Mod: 59

## 2025-03-06 PROCEDURE — 99214 OFFICE O/P EST MOD 30 MIN: CPT

## 2025-03-06 PROCEDURE — 93242 EXT ECG>48HR<7D RECORDING: CPT

## 2025-03-06 PROCEDURE — G2211 COMPLEX E/M VISIT ADD ON: CPT | Mod: NC

## 2025-03-19 ENCOUNTER — APPOINTMENT (OUTPATIENT)
Dept: CARDIOLOGY | Facility: CLINIC | Age: 75
End: 2025-03-19

## 2025-03-19 PROBLEM — Q12.0: Status: ACTIVE | Noted: 2025-03-19

## 2025-03-19 PROCEDURE — 93306 TTE W/DOPPLER COMPLETE: CPT

## 2025-03-20 ENCOUNTER — NON-APPOINTMENT (OUTPATIENT)
Age: 75
End: 2025-03-20

## 2025-04-18 ENCOUNTER — OFFICE (OUTPATIENT)
Dept: URBAN - METROPOLITAN AREA CLINIC 100 | Facility: CLINIC | Age: 75
Setting detail: OPHTHALMOLOGY
End: 2025-04-18
Payer: COMMERCIAL

## 2025-04-18 DIAGNOSIS — H53.40: ICD-10-CM

## 2025-04-18 DIAGNOSIS — H57.813: ICD-10-CM

## 2025-04-18 PROCEDURE — 99214 OFFICE O/P EST MOD 30 MIN: CPT | Performed by: STUDENT IN AN ORGANIZED HEALTH CARE EDUCATION/TRAINING PROGRAM

## 2025-04-18 PROCEDURE — 92285 EXTERNAL OCULAR PHOTOGRAPHY: CPT | Performed by: STUDENT IN AN ORGANIZED HEALTH CARE EDUCATION/TRAINING PROGRAM

## 2025-04-18 PROCEDURE — 92082 INTERMEDIATE VISUAL FIELD XM: CPT | Performed by: STUDENT IN AN ORGANIZED HEALTH CARE EDUCATION/TRAINING PROGRAM

## 2025-04-18 ASSESSMENT — CONFRONTATIONAL VISUAL FIELD TEST (CVF)
OD_FINDINGS: FULL
OS_FINDINGS: FULL

## 2025-04-18 ASSESSMENT — SUPERFICIAL PUNCTATE KERATITIS (SPK)
OS_SPK: T
OD_SPK: T

## 2025-04-18 ASSESSMENT — CORNEAL DYSTROPHY - POSTERIOR: OD_POSTERIORDYSTROPHY: 2+ GUTTATA

## 2025-04-19 ASSESSMENT — REFRACTION_AUTOREFRACTION
OS_SPHERE: +0.50
OD_AXIS: 080
OS_AXIS: 144
OD_CYLINDER: -0.25
OD_SPHERE: +0.50
OS_CYLINDER: -0.50

## 2025-04-19 ASSESSMENT — KERATOMETRY
OS_AXISANGLE_DEGREES: 063
OS_K2POWER_DIOPTERS: 42.75
OD_K2POWER_DIOPTERS: 42.25
OD_AXISANGLE_DEGREES: 069
METHOD_AUTO_MANUAL: AUTO
OS_K1POWER_DIOPTERS: 42.00
OD_K1POWER_DIOPTERS: 41.75

## 2025-04-19 ASSESSMENT — VISUAL ACUITY
OD_BCVA: 20/30-1
OS_BCVA: 20/20

## 2025-04-22 ENCOUNTER — APPOINTMENT (OUTPATIENT)
Dept: VASCULAR SURGERY | Facility: CLINIC | Age: 75
End: 2025-04-22
Payer: COMMERCIAL

## 2025-04-22 VITALS
OXYGEN SATURATION: 96 % | TEMPERATURE: 98.4 F | DIASTOLIC BLOOD PRESSURE: 72 MMHG | RESPIRATION RATE: 16 BRPM | SYSTOLIC BLOOD PRESSURE: 150 MMHG | BODY MASS INDEX: 32.33 KG/M2 | WEIGHT: 206 LBS | HEART RATE: 82 BPM | HEIGHT: 67 IN

## 2025-04-22 DIAGNOSIS — I89.0 LYMPHEDEMA, NOT ELSEWHERE CLASSIFIED: ICD-10-CM

## 2025-04-22 PROCEDURE — 99203 OFFICE O/P NEW LOW 30 MIN: CPT

## 2025-05-02 ENCOUNTER — RX ONLY (RX ONLY)
Age: 75
End: 2025-05-02

## 2025-05-02 ENCOUNTER — OFFICE (OUTPATIENT)
Dept: URBAN - METROPOLITAN AREA CLINIC 6 | Facility: CLINIC | Age: 75
Setting detail: OPHTHALMOLOGY
End: 2025-05-02
Payer: COMMERCIAL

## 2025-05-02 DIAGNOSIS — H02.834: ICD-10-CM

## 2025-05-02 DIAGNOSIS — H40.013: ICD-10-CM

## 2025-05-02 DIAGNOSIS — H02.34: ICD-10-CM

## 2025-05-02 DIAGNOSIS — H43.393: ICD-10-CM

## 2025-05-02 DIAGNOSIS — H02.831: ICD-10-CM

## 2025-05-02 DIAGNOSIS — H02.31: ICD-10-CM

## 2025-05-02 DIAGNOSIS — Z94.7: ICD-10-CM

## 2025-05-02 DIAGNOSIS — H18.511: ICD-10-CM

## 2025-05-02 DIAGNOSIS — H43.811: ICD-10-CM

## 2025-05-02 DIAGNOSIS — G20.C: ICD-10-CM

## 2025-05-02 DIAGNOSIS — H16.223: ICD-10-CM

## 2025-05-02 DIAGNOSIS — Z96.1: ICD-10-CM

## 2025-05-02 PROCEDURE — 99213 OFFICE O/P EST LOW 20 MIN: CPT | Performed by: OPHTHALMOLOGY

## 2025-05-02 PROCEDURE — 76514 ECHO EXAM OF EYE THICKNESS: CPT | Performed by: OPHTHALMOLOGY

## 2025-05-02 PROCEDURE — 92250 FUNDUS PHOTOGRAPHY W/I&R: CPT | Performed by: OPHTHALMOLOGY

## 2025-05-02 RX ORDER — PREDNISOLONE ACETATE 10 MG/ML
SUSPENSION/ DROPS OPHTHALMIC
Qty: 5 | Refills: 6 | Status: ACTIVE | OUTPATIENT

## 2025-05-02 ASSESSMENT — VISUAL ACUITY
OD_BCVA: 20/20-1
OS_BCVA: 20/20

## 2025-05-02 ASSESSMENT — CORNEAL DYSTROPHY - POSTERIOR: OD_POSTERIORDYSTROPHY: 2+ GUTTATA

## 2025-05-02 ASSESSMENT — PACHYMETRY
OS_CT_CORRECTION: 2
OS_CT_UM: 511
OD_CT_CORRECTION: 1
OD_CT_UM: 539

## 2025-05-02 ASSESSMENT — CONFRONTATIONAL VISUAL FIELD TEST (CVF)
OD_FINDINGS: FULL
OS_FINDINGS: FULL

## 2025-05-02 ASSESSMENT — REFRACTION_AUTOREFRACTION
OS_SPHERE: +0.50
OS_CYLINDER: -0.50
OD_AXIS: 007
OD_CYLINDER: -0.25
OS_AXIS: 149
OD_SPHERE: PLANO

## 2025-05-02 ASSESSMENT — KERATOMETRY
OD_K2POWER_DIOPTERS: 42.25
OS_K2POWER_DIOPTERS: 42.75
METHOD_AUTO_MANUAL: AUTO
OD_K1POWER_DIOPTERS: 42.00
OD_AXISANGLE_DEGREES: 083
OS_AXISANGLE_DEGREES: 075
OS_K1POWER_DIOPTERS: 41.75

## 2025-05-02 ASSESSMENT — TONOMETRY
OD_IOP_MMHG: 13
OS_IOP_MMHG: 14

## 2025-05-02 ASSESSMENT — SUPERFICIAL PUNCTATE KERATITIS (SPK)
OS_SPK: T
OD_SPK: T

## 2025-05-23 ENCOUNTER — OFFICE (OUTPATIENT)
Dept: URBAN - METROPOLITAN AREA CLINIC 100 | Facility: CLINIC | Age: 75
Setting detail: OPHTHALMOLOGY
End: 2025-05-23
Payer: COMMERCIAL

## 2025-05-23 DIAGNOSIS — H57.813: ICD-10-CM

## 2025-05-23 DIAGNOSIS — H04.123: ICD-10-CM

## 2025-05-23 PROBLEM — H02.34 BROW PTOSIS; RIGHT UPPER LID, LEFT UPPER LID: Status: ACTIVE | Noted: 2025-05-23

## 2025-05-23 PROBLEM — H02.31 BROW PTOSIS; RIGHT UPPER LID, LEFT UPPER LID: Status: ACTIVE | Noted: 2025-05-23

## 2025-05-23 PROBLEM — H57.03 MIOSIS ; BOTH EYES: Status: ACTIVE | Noted: 2025-05-02

## 2025-05-23 PROCEDURE — 99213 OFFICE O/P EST LOW 20 MIN: CPT | Performed by: STUDENT IN AN ORGANIZED HEALTH CARE EDUCATION/TRAINING PROGRAM

## 2025-05-23 PROCEDURE — 83861 MICROFLUID ANALY TEARS: CPT | Mod: QW | Performed by: STUDENT IN AN ORGANIZED HEALTH CARE EDUCATION/TRAINING PROGRAM

## 2025-05-23 ASSESSMENT — SUPERFICIAL PUNCTATE KERATITIS (SPK)
OS_SPK: T
OD_SPK: T

## 2025-05-23 ASSESSMENT — REFRACTION_AUTOREFRACTION
OS_AXIS: 149
OD_AXIS: 007
OS_CYLINDER: -0.50
OS_SPHERE: +0.50
OD_SPHERE: PLANO
OD_CYLINDER: -0.25

## 2025-05-23 ASSESSMENT — KERATOMETRY
OD_K2POWER_DIOPTERS: 42.25
OD_K1POWER_DIOPTERS: 42.00
METHOD_AUTO_MANUAL: AUTO
OS_AXISANGLE_DEGREES: 075
OS_K2POWER_DIOPTERS: 42.75
OS_K1POWER_DIOPTERS: 41.75
OD_AXISANGLE_DEGREES: 083

## 2025-05-23 ASSESSMENT — VISUAL ACUITY
OS_BCVA: 20/25
OD_BCVA: 20/30-1

## 2025-05-23 ASSESSMENT — CONFRONTATIONAL VISUAL FIELD TEST (CVF)
OS_FINDINGS: FULL
OD_FINDINGS: FULL

## 2025-05-23 ASSESSMENT — CORNEAL DYSTROPHY - POSTERIOR: OD_POSTERIORDYSTROPHY: 2+ GUTTATA

## 2025-05-24 PROCEDURE — 93244 EXT ECG>48HR<7D REV&INTERPJ: CPT

## 2025-05-28 ENCOUNTER — ASC (OUTPATIENT)
Dept: URBAN - METROPOLITAN AREA SURGERY 8 | Facility: SURGERY | Age: 75
Setting detail: OPHTHALMOLOGY
End: 2025-05-28
Payer: COMMERCIAL

## 2025-05-28 DIAGNOSIS — H57.813: ICD-10-CM

## 2025-05-28 PROCEDURE — 67900 REPAIR BROW DEFECT: CPT | Mod: 50 | Performed by: STUDENT IN AN ORGANIZED HEALTH CARE EDUCATION/TRAINING PROGRAM

## 2025-06-08 ENCOUNTER — OFFICE (OUTPATIENT)
Dept: URBAN - METROPOLITAN AREA CLINIC 100 | Facility: CLINIC | Age: 75
Setting detail: OPHTHALMOLOGY
End: 2025-06-08
Payer: COMMERCIAL

## 2025-06-08 DIAGNOSIS — H57.813: ICD-10-CM

## 2025-06-08 PROCEDURE — 99024 POSTOP FOLLOW-UP VISIT: CPT | Performed by: STUDENT IN AN ORGANIZED HEALTH CARE EDUCATION/TRAINING PROGRAM

## 2025-06-08 ASSESSMENT — CONFRONTATIONAL VISUAL FIELD TEST (CVF)
OS_FINDINGS: FULL
OD_FINDINGS: FULL

## 2025-06-08 ASSESSMENT — VISUAL ACUITY
OS_BCVA: 20/25-2
OD_BCVA: 20/30-2

## 2025-06-08 ASSESSMENT — SUPERFICIAL PUNCTATE KERATITIS (SPK)
OS_SPK: T
OD_SPK: T

## 2025-06-08 ASSESSMENT — CORNEAL DYSTROPHY - POSTERIOR: OD_POSTERIORDYSTROPHY: 2+ GUTTATA

## 2025-06-08 ASSESSMENT — KERATOMETRY: METHOD_AUTO_MANUAL: AUTO

## 2025-06-09 ENCOUNTER — APPOINTMENT (OUTPATIENT)
Dept: UROLOGY | Facility: CLINIC | Age: 75
End: 2025-06-09
Payer: COMMERCIAL

## 2025-06-09 VITALS
BODY MASS INDEX: 32.33 KG/M2 | SYSTOLIC BLOOD PRESSURE: 134 MMHG | HEIGHT: 67 IN | DIASTOLIC BLOOD PRESSURE: 68 MMHG | WEIGHT: 206 LBS

## 2025-06-09 PROBLEM — H02.34 BROW PTOSIS; RIGHT UPPER LID, LEFT UPPER LID: Status: ACTIVE | Noted: 2025-06-08

## 2025-06-09 PROBLEM — H02.31 BROW PTOSIS; RIGHT UPPER LID, LEFT UPPER LID: Status: ACTIVE | Noted: 2025-06-08

## 2025-06-09 PROBLEM — B35.6 TINEA CRURIS: Status: ACTIVE | Noted: 2025-06-09

## 2025-06-09 PROCEDURE — 99203 OFFICE O/P NEW LOW 30 MIN: CPT

## 2025-06-09 RX ORDER — CLOTRIMAZOLE 10 MG/G
1 CREAM TOPICAL TWICE DAILY
Qty: 1 | Refills: 1 | Status: ACTIVE | COMMUNITY
Start: 2025-06-09 | End: 1900-01-01

## 2025-06-17 ENCOUNTER — APPOINTMENT (OUTPATIENT)
Dept: CARDIOLOGY | Facility: CLINIC | Age: 75
End: 2025-06-17

## 2025-06-17 ENCOUNTER — NON-APPOINTMENT (OUTPATIENT)
Age: 75
End: 2025-06-17

## 2025-06-17 VITALS
WEIGHT: 204 LBS | HEART RATE: 74 BPM | BODY MASS INDEX: 32.02 KG/M2 | DIASTOLIC BLOOD PRESSURE: 62 MMHG | OXYGEN SATURATION: 97 % | HEIGHT: 67 IN | SYSTOLIC BLOOD PRESSURE: 148 MMHG

## 2025-06-17 VITALS — SYSTOLIC BLOOD PRESSURE: 150 MMHG | DIASTOLIC BLOOD PRESSURE: 66 MMHG

## 2025-06-17 PROCEDURE — G2211 COMPLEX E/M VISIT ADD ON: CPT | Mod: NC

## 2025-06-17 PROCEDURE — 93000 ELECTROCARDIOGRAM COMPLETE: CPT

## 2025-06-17 PROCEDURE — 99214 OFFICE O/P EST MOD 30 MIN: CPT

## 2025-06-17 RX ORDER — NIFEDIPINE 60 MG/1
60 TABLET, FILM COATED, EXTENDED RELEASE ORAL
Qty: 30 | Refills: 0 | Status: ACTIVE | COMMUNITY
Start: 2025-06-17 | End: 1900-01-01

## 2025-06-17 RX ORDER — CARBIDOPA AND LEVODOPA 70; 280 MG/1; MG/1
70-280 CAPSULE, EXTENDED RELEASE ORAL
Refills: 0 | Status: ACTIVE | COMMUNITY

## 2025-07-13 ENCOUNTER — OFFICE (OUTPATIENT)
Dept: URBAN - METROPOLITAN AREA CLINIC 100 | Facility: CLINIC | Age: 75
Setting detail: OPHTHALMOLOGY
End: 2025-07-13
Payer: COMMERCIAL

## 2025-07-13 DIAGNOSIS — H57.813: ICD-10-CM

## 2025-07-13 PROCEDURE — 99024 POSTOP FOLLOW-UP VISIT: CPT | Performed by: STUDENT IN AN ORGANIZED HEALTH CARE EDUCATION/TRAINING PROGRAM

## 2025-07-13 ASSESSMENT — KERATOMETRY: METHOD_AUTO_MANUAL: AUTO

## 2025-07-13 ASSESSMENT — VISUAL ACUITY
OD_BCVA: 20/25-2
OS_BCVA: 20/20

## 2025-07-13 ASSESSMENT — CORNEAL DYSTROPHY - POSTERIOR: OD_POSTERIORDYSTROPHY: 2+ GUTTATA

## 2025-07-13 ASSESSMENT — SUPERFICIAL PUNCTATE KERATITIS (SPK)
OS_SPK: T
OD_SPK: T

## 2025-07-13 ASSESSMENT — CONFRONTATIONAL VISUAL FIELD TEST (CVF)
OS_FINDINGS: FULL
OD_FINDINGS: FULL

## 2025-08-01 ENCOUNTER — RX ONLY (RX ONLY)
Age: 75
End: 2025-08-01

## 2025-08-01 ENCOUNTER — OFFICE (OUTPATIENT)
Dept: URBAN - METROPOLITAN AREA CLINIC 6 | Facility: CLINIC | Age: 75
Setting detail: OPHTHALMOLOGY
End: 2025-08-01
Payer: COMMERCIAL

## 2025-08-01 DIAGNOSIS — H57.813: ICD-10-CM

## 2025-08-01 DIAGNOSIS — H40.013: ICD-10-CM

## 2025-08-01 DIAGNOSIS — Z96.1: ICD-10-CM

## 2025-08-01 DIAGNOSIS — H04.123: ICD-10-CM

## 2025-08-01 DIAGNOSIS — H18.511: ICD-10-CM

## 2025-08-01 DIAGNOSIS — G20.C: ICD-10-CM

## 2025-08-01 DIAGNOSIS — Z94.7: ICD-10-CM

## 2025-08-01 PROCEDURE — 99024 POSTOP FOLLOW-UP VISIT: CPT | Performed by: OPHTHALMOLOGY

## 2025-08-01 PROCEDURE — 92133 CPTRZD OPH DX IMG PST SGM ON: CPT | Performed by: OPHTHALMOLOGY

## 2025-08-01 ASSESSMENT — VISUAL ACUITY
OS_BCVA: 20/25-2
OD_BCVA: 20/20-2

## 2025-08-01 ASSESSMENT — KERATOMETRY
METHOD_AUTO_MANUAL: AUTO
OS_K2POWER_DIOPTERS: 43.25
OD_K2POWER_DIOPTERS: 42.50
OS_AXISANGLE_DEGREES: 081
OD_K1POWER_DIOPTERS: 42.00
OS_K1POWER_DIOPTERS: 42.00
OD_AXISANGLE_DEGREES: 090

## 2025-08-01 ASSESSMENT — TONOMETRY
OS_IOP_MMHG: 14
OD_IOP_MMHG: 12

## 2025-08-01 ASSESSMENT — CORNEAL DYSTROPHY - POSTERIOR: OD_POSTERIORDYSTROPHY: 2+ GUTTATA

## 2025-08-01 ASSESSMENT — PACHYMETRY
OS_CT_UM: 554
OS_CT_CORRECTION: -1

## 2025-08-01 ASSESSMENT — REFRACTION_AUTOREFRACTION
OD_AXIS: 011
OS_CYLINDER: -0.75
OS_AXIS: 159
OD_CYLINDER: -0.75
OS_SPHERE: +0.50
OD_SPHERE: +0.25

## 2025-08-01 ASSESSMENT — SUPERFICIAL PUNCTATE KERATITIS (SPK)
OD_SPK: T
OS_SPK: T

## 2025-08-01 ASSESSMENT — CONFRONTATIONAL VISUAL FIELD TEST (CVF)
OS_FINDINGS: FULL
OD_FINDINGS: FULL

## 2025-08-11 ENCOUNTER — APPOINTMENT (OUTPATIENT)
Dept: UROLOGY | Facility: CLINIC | Age: 75
End: 2025-08-11
Payer: COMMERCIAL

## 2025-08-11 DIAGNOSIS — R35.0 FREQUENCY OF MICTURITION: ICD-10-CM

## 2025-08-11 DIAGNOSIS — R97.20 ELEVATED PROSTATE, SPECIFIC ANTIGEN [PSA]: ICD-10-CM

## 2025-08-11 PROCEDURE — 99214 OFFICE O/P EST MOD 30 MIN: CPT

## (undated) DEVICE — SOL IRR BAG BSS 500ML

## (undated) DEVICE — SUT ETHILON 10-0 12" CS160-6

## (undated) DEVICE — SYR LUER LOK 3CC

## (undated) DEVICE — CANNULA ALCON PROVISC 27G THREADED HUB

## (undated) DEVICE — Device

## (undated) DEVICE — SYR LUER LOK 50CC

## (undated) DEVICE — CANNULA IRR ALCON ANTERIOR CHAMBER 30G

## (undated) DEVICE — WARMING BLANKET LOWER ADULT

## (undated) DEVICE — SPONGE OPHTHALMIC ALCON SPEAR

## (undated) DEVICE — GLV 6.5 PROTEXIS (WHITE)

## (undated) DEVICE — DRAPE 1/2 SHEET 40X57"

## (undated) DEVICE — SOL BALANCE SALT 15ML

## (undated) DEVICE — SOL IRR POUR H2O 250ML

## (undated) DEVICE — SPECIMEN CONTAINER 4OZ

## (undated) DEVICE — BLADE SCLERAL #57

## (undated) DEVICE — CAPSULE GUARD I/A

## (undated) DEVICE — PACK OPTH CATARACT

## (undated) DEVICE — POSITIONER FOAM HEAD CRADLE (PINK)

## (undated) DEVICE — KNIFE ALCON PARACENTESIS CLEARCUT SIDEPORT 1MM (YELLOW)

## (undated) DEVICE — MARKING PEN DEVON DUAL TIP W RULER

## (undated) DEVICE — PACK CENTURION FMS ACT.9 30 BAL

## (undated) DEVICE — NDL REROBULBAR ATKINSON 23G X 1.5"

## (undated) DEVICE — KNIFE ALCON SLIT INTREPID SINGLE BEVEL ANGLED 2.4MM (PURPLE)

## (undated) DEVICE — VENODYNE/SCD SLEEVE CALF MEDIUM

## (undated) DEVICE — INFINITY ANTERIOR VITRECTOMY PACK

## (undated) DEVICE — FILTER SYR 22 MICRONS